# Patient Record
Sex: FEMALE | Race: WHITE
[De-identification: names, ages, dates, MRNs, and addresses within clinical notes are randomized per-mention and may not be internally consistent; named-entity substitution may affect disease eponyms.]

---

## 2017-04-05 ENCOUNTER — APPOINTMENT (OUTPATIENT)
Dept: DERMATOLOGY | Facility: CLINIC | Age: 82
End: 2017-04-05

## 2021-12-15 ENCOUNTER — INPATIENT (INPATIENT)
Facility: HOSPITAL | Age: 86
LOS: 5 days | Discharge: HOME | DRG: 193 | End: 2021-12-21
Attending: HOSPITALIST | Admitting: INTERNAL MEDICINE
Payer: MEDICARE

## 2021-12-15 VITALS
HEIGHT: 64 IN | WEIGHT: 158.73 LBS | DIASTOLIC BLOOD PRESSURE: 93 MMHG | SYSTOLIC BLOOD PRESSURE: 187 MMHG | RESPIRATION RATE: 24 BRPM | HEART RATE: 104 BPM | OXYGEN SATURATION: 92 % | TEMPERATURE: 99 F

## 2021-12-15 DIAGNOSIS — J10.01 INFLUENZA DUE TO OTHER IDENTIFIED INFLUENZA VIRUS WITH THE SAME OTHER IDENTIFIED INFLUENZA VIRUS PNEUMONIA: ICD-10-CM

## 2021-12-15 LAB
ALBUMIN SERPL ELPH-MCNC: 3.4 G/DL — SIGNIFICANT CHANGE UP (ref 3.3–5)
ALP SERPL-CCNC: 114 U/L — SIGNIFICANT CHANGE UP (ref 40–120)
ALT FLD-CCNC: 27 U/L — SIGNIFICANT CHANGE UP (ref 12–78)
ANION GAP SERPL CALC-SCNC: 6 MMOL/L — SIGNIFICANT CHANGE UP (ref 5–17)
AST SERPL-CCNC: 24 U/L — SIGNIFICANT CHANGE UP (ref 15–37)
BASE EXCESS BLDV CALC-SCNC: 0.7 MMOL/L — SIGNIFICANT CHANGE UP
BASOPHILS # BLD AUTO: 0.06 K/UL — SIGNIFICANT CHANGE UP (ref 0–0.2)
BASOPHILS NFR BLD AUTO: 0.7 % — SIGNIFICANT CHANGE UP (ref 0–2)
BILIRUB SERPL-MCNC: 0.7 MG/DL — SIGNIFICANT CHANGE UP (ref 0.2–1.2)
BUN SERPL-MCNC: 16 MG/DL — SIGNIFICANT CHANGE UP (ref 7–23)
CALCIUM SERPL-MCNC: 9.9 MG/DL — SIGNIFICANT CHANGE UP (ref 8.5–10.1)
CHLORIDE SERPL-SCNC: 104 MMOL/L — SIGNIFICANT CHANGE UP (ref 96–108)
CO2 BLDV-SCNC: 25 MMOL/L — SIGNIFICANT CHANGE UP (ref 22–26)
CO2 SERPL-SCNC: 25 MMOL/L — SIGNIFICANT CHANGE UP (ref 22–31)
CREAT SERPL-MCNC: 1.01 MG/DL — SIGNIFICANT CHANGE UP (ref 0.5–1.3)
EOSINOPHIL # BLD AUTO: 0.02 K/UL — SIGNIFICANT CHANGE UP (ref 0–0.5)
EOSINOPHIL NFR BLD AUTO: 0.2 % — SIGNIFICANT CHANGE UP (ref 0–6)
FLUAV H1 2009 PAND RNA SPEC QL NAA+PROBE: DETECTED
GLUCOSE SERPL-MCNC: 132 MG/DL — HIGH (ref 70–99)
HCO3 BLDV-SCNC: 24 MMOL/L — SIGNIFICANT CHANGE UP (ref 22–29)
HCT VFR BLD CALC: 36.7 % — SIGNIFICANT CHANGE UP (ref 34.5–45)
HGB BLD-MCNC: 11.7 G/DL — SIGNIFICANT CHANGE UP (ref 11.5–15.5)
IMM GRANULOCYTES NFR BLD AUTO: 0.5 % — SIGNIFICANT CHANGE UP (ref 0–1.5)
LACTATE SERPL-SCNC: 1.4 MMOL/L — SIGNIFICANT CHANGE UP (ref 0.7–2)
LYMPHOCYTES # BLD AUTO: 1.13 K/UL — SIGNIFICANT CHANGE UP (ref 1–3.3)
LYMPHOCYTES # BLD AUTO: 13.3 % — SIGNIFICANT CHANGE UP (ref 13–44)
MCHC RBC-ENTMCNC: 29.4 PG — SIGNIFICANT CHANGE UP (ref 27–34)
MCHC RBC-ENTMCNC: 31.9 GM/DL — LOW (ref 32–36)
MCV RBC AUTO: 92.2 FL — SIGNIFICANT CHANGE UP (ref 80–100)
MONOCYTES # BLD AUTO: 0.75 K/UL — SIGNIFICANT CHANGE UP (ref 0–0.9)
MONOCYTES NFR BLD AUTO: 8.8 % — SIGNIFICANT CHANGE UP (ref 2–14)
NEUTROPHILS # BLD AUTO: 6.5 K/UL — SIGNIFICANT CHANGE UP (ref 1.8–7.4)
NEUTROPHILS NFR BLD AUTO: 76.5 % — SIGNIFICANT CHANGE UP (ref 43–77)
NT-PROBNP SERPL-SCNC: 6011 PG/ML — HIGH (ref 0–450)
PCO2 BLDV: 35 MMHG — LOW (ref 39–42)
PH BLDV: 7.45 — HIGH (ref 7.32–7.43)
PLATELET # BLD AUTO: 240 K/UL — SIGNIFICANT CHANGE UP (ref 150–400)
PO2 BLDV: 240 MMHG — SIGNIFICANT CHANGE UP
POTASSIUM SERPL-MCNC: 4.2 MMOL/L — SIGNIFICANT CHANGE UP (ref 3.5–5.3)
POTASSIUM SERPL-SCNC: 4.2 MMOL/L — SIGNIFICANT CHANGE UP (ref 3.5–5.3)
PROT SERPL-MCNC: 7.3 GM/DL — SIGNIFICANT CHANGE UP (ref 6–8.3)
RAPID RVP RESULT: DETECTED
RBC # BLD: 3.98 M/UL — SIGNIFICANT CHANGE UP (ref 3.8–5.2)
RBC # FLD: 14 % — SIGNIFICANT CHANGE UP (ref 10.3–14.5)
SAO2 % BLDV: 100 % — SIGNIFICANT CHANGE UP
SARS-COV-2 RNA SPEC QL NAA+PROBE: SIGNIFICANT CHANGE UP
SODIUM SERPL-SCNC: 135 MMOL/L — SIGNIFICANT CHANGE UP (ref 135–145)
TROPONIN I, HIGH SENSITIVITY RESULT: 144.9 NG/L — HIGH
TROPONIN I, HIGH SENSITIVITY RESULT: 179.27 NG/L — HIGH
WBC # BLD: 8.5 K/UL — SIGNIFICANT CHANGE UP (ref 3.8–10.5)
WBC # FLD AUTO: 8.5 K/UL — SIGNIFICANT CHANGE UP (ref 3.8–10.5)

## 2021-12-15 PROCEDURE — G1004: CPT

## 2021-12-15 PROCEDURE — 93005 ELECTROCARDIOGRAM TRACING: CPT

## 2021-12-15 PROCEDURE — 93306 TTE W/DOPPLER COMPLETE: CPT | Mod: 26

## 2021-12-15 PROCEDURE — 71250 CT THORAX DX C-: CPT | Mod: 26

## 2021-12-15 PROCEDURE — 87205 SMEAR GRAM STAIN: CPT

## 2021-12-15 PROCEDURE — 93306 TTE W/DOPPLER COMPLETE: CPT

## 2021-12-15 PROCEDURE — 94640 AIRWAY INHALATION TREATMENT: CPT

## 2021-12-15 PROCEDURE — 83880 ASSAY OF NATRIURETIC PEPTIDE: CPT

## 2021-12-15 PROCEDURE — 84484 ASSAY OF TROPONIN QUANT: CPT

## 2021-12-15 PROCEDURE — 94760 N-INVAS EAR/PLS OXIMETRY 1: CPT

## 2021-12-15 PROCEDURE — 99222 1ST HOSP IP/OBS MODERATE 55: CPT

## 2021-12-15 PROCEDURE — 76770 US EXAM ABDO BACK WALL COMP: CPT

## 2021-12-15 PROCEDURE — 36415 COLL VENOUS BLD VENIPUNCTURE: CPT

## 2021-12-15 PROCEDURE — 84156 ASSAY OF PROTEIN URINE: CPT

## 2021-12-15 PROCEDURE — 85027 COMPLETE CBC AUTOMATED: CPT

## 2021-12-15 PROCEDURE — 97116 GAIT TRAINING THERAPY: CPT | Mod: GP

## 2021-12-15 PROCEDURE — 81001 URINALYSIS AUTO W/SCOPE: CPT

## 2021-12-15 PROCEDURE — 82570 ASSAY OF URINE CREATININE: CPT

## 2021-12-15 PROCEDURE — 85025 COMPLETE CBC W/AUTO DIFF WBC: CPT

## 2021-12-15 PROCEDURE — 99285 EMERGENCY DEPT VISIT HI MDM: CPT

## 2021-12-15 PROCEDURE — 71250 CT THORAX DX C-: CPT | Mod: ME

## 2021-12-15 PROCEDURE — 71045 X-RAY EXAM CHEST 1 VIEW: CPT | Mod: 26

## 2021-12-15 PROCEDURE — 71045 X-RAY EXAM CHEST 1 VIEW: CPT

## 2021-12-15 PROCEDURE — 80048 BASIC METABOLIC PNL TOTAL CA: CPT

## 2021-12-15 PROCEDURE — 97162 PT EVAL MOD COMPLEX 30 MIN: CPT | Mod: GP

## 2021-12-15 RX ORDER — HEPARIN SODIUM 5000 [USP'U]/ML
5000 INJECTION INTRAVENOUS; SUBCUTANEOUS EVERY 12 HOURS
Refills: 0 | Status: DISCONTINUED | OUTPATIENT
Start: 2021-12-15 | End: 2021-12-21

## 2021-12-15 RX ORDER — ATORVASTATIN CALCIUM 80 MG/1
40 TABLET, FILM COATED ORAL AT BEDTIME
Refills: 0 | Status: DISCONTINUED | OUTPATIENT
Start: 2021-12-15 | End: 2021-12-21

## 2021-12-15 RX ORDER — ONDANSETRON 8 MG/1
4 TABLET, FILM COATED ORAL EVERY 8 HOURS
Refills: 0 | Status: DISCONTINUED | OUTPATIENT
Start: 2021-12-15 | End: 2021-12-21

## 2021-12-15 RX ORDER — CLOPIDOGREL BISULFATE 75 MG/1
75 TABLET, FILM COATED ORAL DAILY
Refills: 0 | Status: DISCONTINUED | OUTPATIENT
Start: 2021-12-15 | End: 2021-12-21

## 2021-12-15 RX ORDER — LANOLIN ALCOHOL/MO/W.PET/CERES
3 CREAM (GRAM) TOPICAL AT BEDTIME
Refills: 0 | Status: DISCONTINUED | OUTPATIENT
Start: 2021-12-15 | End: 2021-12-21

## 2021-12-15 RX ORDER — POTASSIUM CHLORIDE 20 MEQ
10 PACKET (EA) ORAL
Refills: 0 | Status: DISCONTINUED | OUTPATIENT
Start: 2021-12-15 | End: 2021-12-21

## 2021-12-15 RX ORDER — ACETAMINOPHEN 500 MG
650 TABLET ORAL EVERY 6 HOURS
Refills: 0 | Status: DISCONTINUED | OUTPATIENT
Start: 2021-12-15 | End: 2021-12-21

## 2021-12-15 RX ORDER — IPRATROPIUM/ALBUTEROL SULFATE 18-103MCG
3 AEROSOL WITH ADAPTER (GRAM) INHALATION
Refills: 0 | Status: COMPLETED | OUTPATIENT
Start: 2021-12-15 | End: 2021-12-15

## 2021-12-15 RX ORDER — METOPROLOL TARTRATE 50 MG
50 TABLET ORAL DAILY
Refills: 0 | Status: DISCONTINUED | OUTPATIENT
Start: 2021-12-15 | End: 2021-12-19

## 2021-12-15 RX ORDER — METOPROLOL TARTRATE 50 MG
25 TABLET ORAL AT BEDTIME
Refills: 0 | Status: DISCONTINUED | OUTPATIENT
Start: 2021-12-15 | End: 2021-12-21

## 2021-12-15 RX ORDER — LATANOPROST 0.05 MG/ML
1 SOLUTION/ DROPS OPHTHALMIC; TOPICAL AT BEDTIME
Refills: 0 | Status: DISCONTINUED | OUTPATIENT
Start: 2021-12-15 | End: 2021-12-21

## 2021-12-15 RX ADMIN — Medication 25 MILLIGRAM(S): at 22:29

## 2021-12-15 RX ADMIN — CLOPIDOGREL BISULFATE 75 MILLIGRAM(S): 75 TABLET, FILM COATED ORAL at 18:50

## 2021-12-15 RX ADMIN — ATORVASTATIN CALCIUM 40 MILLIGRAM(S): 80 TABLET, FILM COATED ORAL at 22:29

## 2021-12-15 RX ADMIN — LATANOPROST 1 DROP(S): 0.05 SOLUTION/ DROPS OPHTHALMIC; TOPICAL at 22:29

## 2021-12-15 RX ADMIN — Medication 125 MILLIGRAM(S): at 08:54

## 2021-12-15 RX ADMIN — Medication 30 MILLIGRAM(S): at 22:29

## 2021-12-15 RX ADMIN — Medication 3 MILLILITER(S): at 11:33

## 2021-12-15 RX ADMIN — HEPARIN SODIUM 5000 UNIT(S): 5000 INJECTION INTRAVENOUS; SUBCUTANEOUS at 18:50

## 2021-12-15 RX ADMIN — Medication 3 MILLILITER(S): at 09:37

## 2021-12-15 RX ADMIN — Medication 3 MILLILITER(S): at 10:14

## 2021-12-15 NOTE — ED PROVIDER NOTE - OBJECTIVE STATEMENT
89yo f hx of COPD presents s/p cough and shortness of breath. symptoms x 2 days. no known fevers or chills. pt unable to provide hx 2/2 SOB.

## 2021-12-15 NOTE — H&P ADULT - ASSESSMENT
* SOB, acute hypoxic resp failure sec to flu  resolved  Tamiflu  supplemental O2  resume inhalers    * Cardiomegaly  check TTE, bilat pl eff, might need Lasix if BP allows  resume BB, Plavix    * Demand ischemia  trend trops,    d/w son call [phone] not the # in the chart  Full code

## 2021-12-15 NOTE — ED PROVIDER NOTE - NS ED ROS FT
Gen: No fever, normal appetite  Eyes: No eye irritation or discharge  ENT: No ear pain, congestion, sore throat  Resp: cough   Cardiovascular: No chest pain or palpitation  Gastroenteric: No nausea/vomiting, diarrhea, constipation  :  No change in urine output; no dysuria  MS: No joint or muscle pain  Skin: No rashes  Neuro: No headache; no abnormal movements  Remainder negative, except as per the HPI

## 2021-12-15 NOTE — PHARMACOTHERAPY INTERVENTION NOTE - COMMENTS
Med history complete, reviewed medications with doctor first med profile and [facility] [phone] Med history complete, patient unable to provide medication history, no emergency contact on file, reviewed medications with doctor first med profile and [facility] pharmacy [phone]

## 2021-12-15 NOTE — ED PROVIDER NOTE - PHYSICAL EXAMINATION
GEN - illl appearing   HEAD - NC/AT     EYES - EOMI, no conjunctival pallor, no scleral icterus  ENT -   mucous membranes  moist , no discharge      NECK - Neck supple  PULM - diffuse wheezing   COR -  RRR, S1 S2, no murmurs  ABD - , ND, NT, soft, no guarding, no rebound, no masses    BACK - no CVA tenderness, nontender spine     EXTREMS - no edema, no deformity, warm and well perfused    SKIN - no rash or bruising      NEUROLOGIC - alert, sensation nl, motor 5/5 RUE/LUE/RLE/LLE

## 2021-12-15 NOTE — ED ADULT TRIAGE NOTE - CHIEF COMPLAINT QUOTE
pt presents to ed via ems from home fore evaluation of trouble breathing since last night, in ed pt tachypneic and pulse ox 92 % on room air, pt wheezing. pt has hx of mild dementia.  pt son gene . pt received covid vaccine, pt unable to provide hx due to acuity of illness. pt on plavix vasotac metoprolol

## 2021-12-15 NOTE — H&P ADULT - NSHPLABSRESULTS_GEN_ALL_CORE
11.7   8.50  )-----------( 240      ( 15 Dec 2021 08:37 )             36.7   12-15    135  |  104  |  16  ----------------------------<  132<H>  4.2   |  25  |  1.01    Ca    9.9      15 Dec 2021 08:37    TPro  7.3  /  Alb  3.4  /  TBili  0.7  /  DBili  x   /  AST  24  /  ALT  27  /  AlkPhos  114  12-15    cxr my interpretation: new small pl eff bilateral, L>R, cardiomegaly    BNP 6K  ekg no ischemia

## 2021-12-15 NOTE — ED ADULT NURSE NOTE - NSIMPLEMENTINTERV_GEN_ALL_ED
Implemented All Fall Risk Interventions:  [place] to call system. Call bell, personal items and telephone within reach. Instruct patient to call for assistance. Room bathroom lighting operational. Non-slip footwear when patient is off stretcher. Physically safe environment: no spills, clutter or unnecessary equipment. Stretcher in lowest position, wheels locked, appropriate side rails in place. Provide visual cue, wrist band, yellow gown, etc. Monitor gait and stability. Monitor for mental status changes and reorient to person, place, and time. Review medications for side effects contributing to fall risk. Reinforce activity limits and safety measures with patient and family.

## 2021-12-15 NOTE — H&P ADULT - HISTORY OF PRESENT ILLNESS
89yo f hx of COPD presents s/p cough and shortness of breath. symptoms x 2 days. no known fevers or chills. pt unable to provide hx 2/2 SOB. She is + for flu     89yo f hx of COPD presents s/p cough and shortness of breath; symptoms x 2 days. no known fevers or chills. pt unable to provide hx 2/2 SOB. She is + for flu. Feels better on O2 and has no c/o.

## 2021-12-15 NOTE — PATIENT PROFILE ADULT - FALL HARM RISK - HARM RISK INTERVENTIONS
Assistance with ambulation/Assistance OOB with selected safe patient handling equipment/Communicate Risk of Fall with Harm to all staff/Reinforce activity limits and safety measures with patient and family/Review medications for side effects contributing to fall risk/Sit up slowly, dangle for a short time, stand at bedside before walking/Tailored Fall Risk Interventions/Toileting schedule using arm’s reach rule for commode and bathroom/Visual Cue: Yellow wristband and red socks/Bed in lowest position, wheels locked, appropriate side rails in place/Call bell, personal items and telephone in reach/Instruct patient to call for assistance before getting out of bed or chair/Non-slip footwear when patient is out of bed/[place] to call system/Physically safe environment - no spills, clutter or unnecessary equipment/Purposeful Proactive Rounding/Room/bathroom lighting operational, light cord in reach

## 2021-12-16 LAB
ADD ON TEST-SPECIMEN IN LAB: SIGNIFICANT CHANGE UP
ANION GAP SERPL CALC-SCNC: 4 MMOL/L — LOW (ref 5–17)
BUN SERPL-MCNC: 28 MG/DL — HIGH (ref 7–23)
CALCIUM SERPL-MCNC: 9.5 MG/DL — SIGNIFICANT CHANGE UP (ref 8.5–10.1)
CHLORIDE SERPL-SCNC: 109 MMOL/L — HIGH (ref 96–108)
CO2 SERPL-SCNC: 26 MMOL/L — SIGNIFICANT CHANGE UP (ref 22–31)
CREAT SERPL-MCNC: 1.08 MG/DL — SIGNIFICANT CHANGE UP (ref 0.5–1.3)
GLUCOSE SERPL-MCNC: 120 MG/DL — HIGH (ref 70–99)
HCT VFR BLD CALC: 34.3 % — LOW (ref 34.5–45)
HGB BLD-MCNC: 11 G/DL — LOW (ref 11.5–15.5)
MCHC RBC-ENTMCNC: 29.4 PG — SIGNIFICANT CHANGE UP (ref 27–34)
MCHC RBC-ENTMCNC: 32.1 GM/DL — SIGNIFICANT CHANGE UP (ref 32–36)
MCV RBC AUTO: 91.7 FL — SIGNIFICANT CHANGE UP (ref 80–100)
PLATELET # BLD AUTO: 259 K/UL — SIGNIFICANT CHANGE UP (ref 150–400)
POTASSIUM SERPL-MCNC: 4.3 MMOL/L — SIGNIFICANT CHANGE UP (ref 3.5–5.3)
POTASSIUM SERPL-SCNC: 4.3 MMOL/L — SIGNIFICANT CHANGE UP (ref 3.5–5.3)
RBC # BLD: 3.74 M/UL — LOW (ref 3.8–5.2)
RBC # FLD: 14 % — SIGNIFICANT CHANGE UP (ref 10.3–14.5)
SODIUM SERPL-SCNC: 139 MMOL/L — SIGNIFICANT CHANGE UP (ref 135–145)
WBC # BLD: 8.82 K/UL — SIGNIFICANT CHANGE UP (ref 3.8–10.5)
WBC # FLD AUTO: 8.82 K/UL — SIGNIFICANT CHANGE UP (ref 3.8–10.5)

## 2021-12-16 PROCEDURE — 99232 SBSQ HOSP IP/OBS MODERATE 35: CPT

## 2021-12-16 PROCEDURE — 93010 ELECTROCARDIOGRAM REPORT: CPT

## 2021-12-16 RX ORDER — CHOLECALCIFEROL (VITAMIN D3) 125 MCG
1000 CAPSULE ORAL DAILY
Refills: 0 | Status: DISCONTINUED | OUTPATIENT
Start: 2021-12-16 | End: 2021-12-21

## 2021-12-16 RX ORDER — METOPROLOL TARTRATE 50 MG
2 TABLET ORAL
Qty: 0 | Refills: 0 | DISCHARGE

## 2021-12-16 RX ORDER — ALBUTEROL 90 UG/1
2 AEROSOL, METERED ORAL EVERY 6 HOURS
Refills: 0 | Status: DISCONTINUED | OUTPATIENT
Start: 2021-12-16 | End: 2021-12-21

## 2021-12-16 RX ORDER — POTASSIUM CHLORIDE 20 MEQ
1 PACKET (EA) ORAL
Qty: 0 | Refills: 0 | DISCHARGE

## 2021-12-16 RX ORDER — BUDESONIDE AND FORMOTEROL FUMARATE DIHYDRATE 160; 4.5 UG/1; UG/1
2 AEROSOL RESPIRATORY (INHALATION)
Refills: 0 | Status: DISCONTINUED | OUTPATIENT
Start: 2021-12-16 | End: 2021-12-21

## 2021-12-16 RX ORDER — NYSTATIN CREAM 100000 [USP'U]/G
1 CREAM TOPICAL
Refills: 0 | Status: DISCONTINUED | OUTPATIENT
Start: 2021-12-16 | End: 2021-12-21

## 2021-12-16 RX ORDER — IRON POLYSACCHARIDE COMPLEX 150 MG
1 CAPSULE ORAL
Qty: 0 | Refills: 0 | DISCHARGE

## 2021-12-16 RX ORDER — ATORVASTATIN CALCIUM 80 MG/1
1 TABLET, FILM COATED ORAL
Qty: 0 | Refills: 0 | DISCHARGE

## 2021-12-16 RX ORDER — CLOPIDOGREL BISULFATE 75 MG/1
1 TABLET, FILM COATED ORAL
Qty: 0 | Refills: 0 | DISCHARGE

## 2021-12-16 RX ORDER — METOPROLOL TARTRATE 50 MG
1 TABLET ORAL
Qty: 0 | Refills: 0 | DISCHARGE

## 2021-12-16 RX ADMIN — HEPARIN SODIUM 5000 UNIT(S): 5000 INJECTION INTRAVENOUS; SUBCUTANEOUS at 09:31

## 2021-12-16 RX ADMIN — Medication 10 MILLIGRAM(S): at 09:31

## 2021-12-16 RX ADMIN — NYSTATIN CREAM 1 APPLICATION(S): 100000 CREAM TOPICAL at 21:16

## 2021-12-16 RX ADMIN — ATORVASTATIN CALCIUM 40 MILLIGRAM(S): 80 TABLET, FILM COATED ORAL at 21:18

## 2021-12-16 RX ADMIN — Medication 25 MILLIGRAM(S): at 21:18

## 2021-12-16 RX ADMIN — Medication 30 MILLIGRAM(S): at 21:18

## 2021-12-16 RX ADMIN — Medication 50 MILLIGRAM(S): at 09:47

## 2021-12-16 RX ADMIN — CLOPIDOGREL BISULFATE 75 MILLIGRAM(S): 75 TABLET, FILM COATED ORAL at 09:31

## 2021-12-16 RX ADMIN — Medication 30 MILLIGRAM(S): at 09:32

## 2021-12-16 RX ADMIN — LATANOPROST 1 DROP(S): 0.05 SOLUTION/ DROPS OPHTHALMIC; TOPICAL at 21:15

## 2021-12-16 RX ADMIN — HEPARIN SODIUM 5000 UNIT(S): 5000 INJECTION INTRAVENOUS; SUBCUTANEOUS at 21:18

## 2021-12-16 NOTE — DIETITIAN INITIAL EVALUATION ADULT. - ADD RECOMMEND
1) Add Ensure Enlive BID, for between/after meals 2) Suggest add MVI w/minerals, Vit C 500 mg BID, add Zinc Sulfate 220 mg x 10 days to promote wound healing 3) Initiate shifting, turning protocol to redistribute wt off PU 4) Consider GOC planning, suggest address nutrition support, nutrition support is not recommended due to overall declining medical status which evidenced based studies indicate EN is not effective in prolonging survival and improving quality of life. It can also increase risk of aspiration pneumonia as well as other related issues. 5) Implement aspiration precautions 6) Monitor BM, if no BM for >3days, consider adding bowel regimen 7) monitor labs, lytes and hydration prn 1) Add Ensure Enlive BID, for between/after meals 2) Suggest add MVI w/minerals, Vit C 500 mg BID, add Zinc Sulfate 220 mg x 10 days to promote wound healing 3) Initiate shifting, turning protocol to redistribute wt off PU 4) Consider GOC planning, suggest address nutrition support, nutrition support is not recommended due to overall declining medical status which evidenced based studies indicate EN is not effective in prolonging survival and improving quality of life. It can also increase risk of aspiration pneumonia as well as other related issues. 5) Implement aspiration precautions 6) Monitor BM, if no BM for >3days, consider adding bowel regimen 7) monitor labs, lytes and hydration prn 8) Consider SLP consult to address pureed textured diet order

## 2021-12-16 NOTE — PROGRESS NOTE ADULT - ASSESSMENT
* SOB, acute hypoxic resp failure sec to flu  Tamiflu  supplemental O2  nebs      * Cardiomegaly  check TTE, bilat pl eff, might need Lasix if BP allows  resume BB, Plavix    * Elevated troponin likely related Demand ischemia  - trend trops  - trending down     d/w son call [phone]  Full code  Possible dc in 24 hours.

## 2021-12-16 NOTE — PROGRESS NOTE ADULT - SUBJECTIVE AND OBJECTIVE BOX
89 yo f hx of COPD presents s/p cough and shortness of breath; symptoms x 2 days. No known fevers or chills. Pt unable to provide hx 2/2 SOB. She is + for Influenza, on supplemental O2. Elevated troponin and BNP on admission.     12/16- patient was seen and examined. Oriented x3- stated that she still feels SOB and coughing a lot. Denies CP.    Vital Signs Last 24 Hrs  T(C): 36.8 (16 Dec 2021 07:36), Max: 36.8 (15 Dec 2021 17:00)  T(F): 98.2 (16 Dec 2021 07:36), Max: 98.2 (15 Dec 2021 17:00)  HR: 75 (16 Dec 2021 07:36) (75 - 92)  BP: 143/60 (16 Dec 2021 07:36) (134/52 - 146/60)  BP(mean): 80 (15 Dec 2021 17:00) (80 - 80)  RR: 19 (16 Dec 2021 07:36) (19 - 24)  SpO2: 100% (16 Dec 2021 07:36) (96% - 100%)    ROS:   All 10 systems reviewed and found to be negative with the exception of what has been described above.       PE:  Constitutional: NAD, laying in bed  HEENT: NC/AT  Back: no tenderness  Respiratory: respirations even and non labored, LCTA- on supplemental O2   Cardiovascular: S1S2 regular, no murmurs  Abdomen: soft, not tender, not distended, positive BS  Genitourinary: voiding  Musculoskeletal: no muscle tenderness, no joint swelling or tenderness  Extremities: no pedal edema   Neurological: no focal deficits    MEDICATIONS  (STANDING):  atorvastatin 40 milliGRAM(s) Oral at bedtime  budesonide 160 MICROgram(s)/formoterol 4.5 MICROgram(s) Inhaler 2 Puff(s) Inhalation two times a day  cholecalciferol 1000 Unit(s) Oral daily  clopidogrel Tablet 75 milliGRAM(s) Oral daily  enalapril 10 milliGRAM(s) Oral daily  heparin   Injectable 5000 Unit(s) SubCutaneous every 12 hours  latanoprost 0.005% Ophthalmic Solution 1 Drop(s) Both EYES at bedtime  metoprolol tartrate 50 milliGRAM(s) Oral daily  metoprolol tartrate 25 milliGRAM(s) Oral at bedtime  multivitamin 1 Tablet(s) Oral daily  nystatin Powder 1 Application(s) Topical two times a day  oseltamivir 30 milliGRAM(s) Oral two times a day  potassium chloride    Tablet ER 10 milliEquivalent(s) Oral daily    MEDICATIONS  (PRN):  acetaminophen     Tablet .. 650 milliGRAM(s) Oral every 6 hours PRN Temp greater or equal to 38C (100.4F), Mild Pain (1 - 3)  ALBUTerol    90 MICROgram(s) HFA Inhaler 2 Puff(s) Inhalation every 6 hours PRN Shortness of Breath and/or Wheezing  aluminum hydroxide/magnesium hydroxide/simethicone Suspension 30 milliLiter(s) Oral every 4 hours PRN Dyspepsia  melatonin 3 milliGRAM(s) Oral at bedtime PRN Insomnia  ondansetron Injectable 4 milliGRAM(s) IV Push every 8 hours PRN Nausea and/or Vomiting      12-16    139  |  109<H>  |  28<H>  ----------------------------<  120<H>  4.3   |  26  |  1.08    Ca    9.5      16 Dec 2021 09:40    TPro  7.3  /  Alb  3.4  /  TBili  0.7  /  DBili  x   /  AST  24  /  ALT  27  /  AlkPhos  114  12-15                          11.0   8.82  )-----------( 259      ( 16 Dec 2021 09:40 )             34.3       < from: CT Chest No Cont (12.15.21 @ 13:07) >  PROCEDURE DATE:  12/15/2021          INTERPRETATION:  CLINICAL INFORMATION: Pneumonia    TECHNIQUE:  A volumetric CT acquisition of the chest was obtained from   the thoracic inlet to the upperabdomen, without the administration  of   intravenous contrast. Coronal and sagittal multiplanar reformations were   also submitted.    Comparison: No prior chest CT scan is available for comparison    FINDINGS:    Lungs/Airways/Pleura: Study is expiratory with mosaic appearance of the   lungs. No lung consolidation or groundglass. There are small right   greater than left pleural effusions.    Mediastinum/Lymph nodes: No thoracic adenopathy. There is a moderate   hiatal hernia.    Heart and Vessels: The left atrium is enlarged. There is bulky near   completely circumferential mitral annular calcification. There is   moderate aortic valve calcification. The ascending aorta measures 3.8 cm   at the level of the right pulmonary artery. No pericardial effusion.    Upper Abdomen: There is an exophytic hyperdense 1.6 cm right renal   lesion, possibly a hyperdense cyst although not characterized on this   study. 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    Osseous structures and Soft Tissues: There is diffuse qualitative   osteopenia. There is an age-indeterminate L1 vertebral body compression   fracture.    IMPRESSION:    1.  No pneumonia. Small right greater than left pleural effusions.    2.  Moderate-sized hiatal hernia    3.  Left atrial enlargement      < end of copied text >

## 2021-12-16 NOTE — DIETITIAN INITIAL EVALUATION ADULT. - MALNUTRITION
Meets criteria for severe protein-calorie malnutrition in the context of acute on chronic illness/injury Meets criteria for severe protein-calorie malnutrition in the context of acute on chronic illness/injury r/t decreased ability to meet nutrient needs 2/2 influenza/ SOB w/COPD AEB 7% wt loss x 1 month, mod (+2) edema and mod muscle/fat wasting

## 2021-12-16 NOTE — DIETITIAN INITIAL EVALUATION ADULT. - PERTINENT LABORATORY DATA
12-16    139  |  109<H>  |  28<H>  ----------------------------<  120<H>  4.3   |  26  |  1.08    Ca    9.5      16 Dec 2021 09:40    TPro  7.3  /  Alb  3.4  /  TBili  0.7  /  DBili  x   /  AST  24  /  ALT  27  /  AlkPhos  114  12-15  BMI: BMI (kg/m2): 27.2 (12-15-21 @ 08:03)  HbA1c:   Glucose:   BP: 143/60 (12-16-21 @ 07:36) (126/53 - 187/93)  Lipid Panel:

## 2021-12-16 NOTE — DIETITIAN INITIAL EVALUATION ADULT. - PERTINENT MEDS FT
MEDICATIONS  (STANDING):  atorvastatin 40 milliGRAM(s) Oral at bedtime  clopidogrel Tablet 75 milliGRAM(s) Oral daily  enalapril 10 milliGRAM(s) Oral daily  heparin   Injectable 5000 Unit(s) SubCutaneous every 12 hours  latanoprost 0.005% Ophthalmic Solution 1 Drop(s) Both EYES at bedtime  metoprolol tartrate 50 milliGRAM(s) Oral daily  metoprolol tartrate 25 milliGRAM(s) Oral at bedtime  oseltamivir 30 milliGRAM(s) Oral two times a day  potassium chloride    Tablet ER 10 milliEquivalent(s) Oral two times a day    MEDICATIONS  (PRN):  acetaminophen     Tablet .. 650 milliGRAM(s) Oral every 6 hours PRN Temp greater or equal to 38C (100.4F), Mild Pain (1 - 3)  aluminum hydroxide/magnesium hydroxide/simethicone Suspension 30 milliLiter(s) Oral every 4 hours PRN Dyspepsia  melatonin 3 milliGRAM(s) Oral at bedtime PRN Insomnia  ondansetron Injectable 4 milliGRAM(s) IV Push every 8 hours PRN Nausea and/or Vomiting

## 2021-12-16 NOTE — DIETITIAN INITIAL EVALUATION ADULT. - PHYSCIAL ASSESSMENT
frail Juan Luis Score: 17  Last BM: none documented since admission 12-15 (1 day)  Skin- PU st. 2 R shin

## 2021-12-16 NOTE — DIETITIAN INITIAL EVALUATION ADULT. - OTHER INFO
89 yo female PMH COPD, CAD, presenting with influenza virus, SOB and PU st 2. Lives in apartment on sons property and has a home health aid that comes x3 days, weekly. She does not allow the home health aid to cook for her. Her son brings her food occasionally but only small breakfast and lunches. She does not eat dinner. Current diet order, pureed, despite absence of SLP note or order. Pt reported appetite as "fine", however she stated she ate "a small bites" from her breakfast tray. Unable to obtain bed wt, used wt from chart. PU stage 2 on R Perez. Full Code. Suggest GOC planning, address nutrition support. Wt loss 7% x 1 month. Meets criteria for severe protein-calorie malnutrition in the context of acute illness/injury   Pt will trial Ensure Enlive BID. See other recommendations below. Will continue to monitor and follow up prn. 89 yo female PMH COPD, CAD, presenting with influenza virus, SOB and PU st 2. Lives in apartment on sons property and has a home health aid that comes x3 days, weekly. She does not allow the home health aid to cook for her. Her son brings her food occasionally but only small breakfast and lunches. She does not eat dinner. Current diet order, pureed, despite absence of SLP note or order. Pt denies chewing/swallowing difficulty.  Pt reported appetite as "fine", however she stated she ate "a small bites" from her breakfast tray. Unable to obtain bed wt, used wt from chart. PU stage 2 on R Perez. Full Code. Suggest GOC planning, address nutrition support. Wt loss 7% x 1 month. Meets criteria for severe protein-calorie malnutrition in the context of acute illness/injury. Pt will trial Ensure Enlive BID. See other recommendations below. Will continue to monitor and follow up prn. 89 yo female PMH COPD, CAD, presenting with influenza virus, SOB and PU st 2. Lives in apartment on sons property and has a home health aide that comes x3 days, weekly. She does not allow the home health aide to cook for her. Her son brings her food occasionally but only small breakfast and lunches. She does not eat dinner. Current diet order, pureed, despite absence of SLP note or order. Pt denies chewing/swallowing difficulty.  Pt reported appetite as "fine", however she stated she ate "a small bites" from her breakfast tray. Unable to obtain bed wt, used wt from chart. PU stage 2 on R Perez. Full Code. Suggest GOC planning, address nutrition support. Wt loss 7% x 1 month. Meets criteria for severe protein-calorie malnutrition in the context of acute illness/injury. Pt will trial Ensure Enlive BID. See other recommendations below. Will continue to monitor and follow up prn.

## 2021-12-16 NOTE — DIETITIAN NUTRITION RISK NOTIFICATION - ADDITIONAL COMMENTS/DIETITIAN RECOMMENDATIONS
· Additional Recommendations  1) Add Ensure Enlive BID, for between/after meals 2) Suggest add MVI w/minerals, Vit C 500 mg BID, add Zinc Sulfate 220 mg x 10 days to promote wound healing 3) Initiate shifting, turning protocol to redistribute wt off PU 4) Consider GOC planning, suggest address nutrition support, nutrition support is not recommended due to overall declining medical status which evidenced based studies indicate EN is not effective in prolonging survival and improving quality of life. It can also increase risk of aspiration pneumonia as well as other related issues. 5) Implement aspiration precautions 6) Monitor BM, if no BM for >3days, consider adding bowel regimen 7) monitor labs, lytes and hydration prn 8) Consider SLP consult to address pureed textured diet order

## 2021-12-16 NOTE — DIETITIAN INITIAL EVALUATION ADULT. - ORAL INTAKE PTA/DIET HISTORY
PTA pt was receiving </= 50% dietary needs daily. Pt reported </= 50% dietary intake based on 24 hr diet recall .

## 2021-12-17 PROCEDURE — 93010 ELECTROCARDIOGRAM REPORT: CPT

## 2021-12-17 PROCEDURE — 99232 SBSQ HOSP IP/OBS MODERATE 35: CPT

## 2021-12-17 RX ORDER — PANTOPRAZOLE SODIUM 20 MG/1
40 TABLET, DELAYED RELEASE ORAL
Refills: 0 | Status: DISCONTINUED | OUTPATIENT
Start: 2021-12-17 | End: 2021-12-21

## 2021-12-17 RX ORDER — ALBUTEROL 90 UG/1
2 AEROSOL, METERED ORAL ONCE
Refills: 0 | Status: COMPLETED | OUTPATIENT
Start: 2021-12-17 | End: 2021-12-17

## 2021-12-17 RX ADMIN — Medication 1 TABLET(S): at 10:10

## 2021-12-17 RX ADMIN — HEPARIN SODIUM 5000 UNIT(S): 5000 INJECTION INTRAVENOUS; SUBCUTANEOUS at 10:10

## 2021-12-17 RX ADMIN — BUDESONIDE AND FORMOTEROL FUMARATE DIHYDRATE 2 PUFF(S): 160; 4.5 AEROSOL RESPIRATORY (INHALATION) at 22:06

## 2021-12-17 RX ADMIN — ALBUTEROL 2 PUFF(S): 90 AEROSOL, METERED ORAL at 18:15

## 2021-12-17 RX ADMIN — ALBUTEROL 2 PUFF(S): 90 AEROSOL, METERED ORAL at 08:18

## 2021-12-17 RX ADMIN — Medication 30 MILLIGRAM(S): at 22:11

## 2021-12-17 RX ADMIN — LATANOPROST 1 DROP(S): 0.05 SOLUTION/ DROPS OPHTHALMIC; TOPICAL at 22:12

## 2021-12-17 RX ADMIN — Medication 50 MILLIGRAM(S): at 10:10

## 2021-12-17 RX ADMIN — NYSTATIN CREAM 1 APPLICATION(S): 100000 CREAM TOPICAL at 10:11

## 2021-12-17 RX ADMIN — Medication 30 MILLIGRAM(S): at 10:10

## 2021-12-17 RX ADMIN — Medication 1000 UNIT(S): at 10:09

## 2021-12-17 RX ADMIN — Medication 40 MILLIGRAM(S): at 15:42

## 2021-12-17 RX ADMIN — NYSTATIN CREAM 1 APPLICATION(S): 100000 CREAM TOPICAL at 22:12

## 2021-12-17 RX ADMIN — ATORVASTATIN CALCIUM 40 MILLIGRAM(S): 80 TABLET, FILM COATED ORAL at 22:10

## 2021-12-17 RX ADMIN — BUDESONIDE AND FORMOTEROL FUMARATE DIHYDRATE 2 PUFF(S): 160; 4.5 AEROSOL RESPIRATORY (INHALATION) at 08:18

## 2021-12-17 RX ADMIN — CLOPIDOGREL BISULFATE 75 MILLIGRAM(S): 75 TABLET, FILM COATED ORAL at 10:10

## 2021-12-17 RX ADMIN — Medication 3 MILLIGRAM(S): at 22:13

## 2021-12-17 RX ADMIN — ALBUTEROL 2 PUFF(S): 90 AEROSOL, METERED ORAL at 22:05

## 2021-12-17 RX ADMIN — ALBUTEROL 2 PUFF(S): 90 AEROSOL, METERED ORAL at 16:52

## 2021-12-17 RX ADMIN — Medication 10 MILLIGRAM(S): at 10:10

## 2021-12-17 RX ADMIN — Medication 25 MILLIGRAM(S): at 22:11

## 2021-12-17 RX ADMIN — Medication 40 MILLIGRAM(S): at 22:11

## 2021-12-17 RX ADMIN — HEPARIN SODIUM 5000 UNIT(S): 5000 INJECTION INTRAVENOUS; SUBCUTANEOUS at 22:11

## 2021-12-17 NOTE — CDI QUERY NOTE - NSCDIOTHERTXTBX_GEN_ALL_CORE_HH
Per PN 12/16 " * SOB, acute hypoxic resp failure sec to flu "  Per ER provider " Principal Discharge DX:	Influenza A virus present."    Labs reveal : Influenza AH3 (RapRVP): Detected (12.15.21 @ 08:37)     Please clarify the Type of Flu  a) Influenza AH3  b) Influenza A virus present  c) Influenza from unidentified virus  d) Other, please clarify

## 2021-12-17 NOTE — PROGRESS NOTE ADULT - SUBJECTIVE AND OBJECTIVE BOX
89 yo f hx of COPD presents s/p cough and shortness of breath; symptoms x 2 days. No known fevers or chills. Pt unable to provide hx 2/2 SOB. She is + for Influenza, on supplemental O2. Elevated troponin and BNP on admission.     12/16- patient was seen and examined. Oriented x3- stated that she still feels SOB and coughing a lot. Denies CP.  12/17- pt still complaining of SOB and coughing, +wheezing. SOB improved from yesterday.     Vital Signs Last 24 Hrs  T(C): 36.4 (17 Dec 2021 09:01), Max: 36.4 (16 Dec 2021 17:13)  T(F): 97.5 (17 Dec 2021 09:01), Max: 97.5 (16 Dec 2021 17:13)  HR: 74 (17 Dec 2021 09:01) (74 - 78)  BP: 142/57 (17 Dec 2021 09:01) (142/57 - 165/48)  BP(mean): --  RR: 18 (17 Dec 2021 09:01) (18 - 18)  SpO2: 98% (17 Dec 2021 09:01) (98% - 100%)    ROS:   All 10 systems reviewed and found to be negative with the exception of what has been described above.       PE:  Constitutional: NAD, laying in bed  HEENT: NC/AT  Back: no tenderness  Respiratory: respirations even and non labored, +expiratory wheezing  on supplemental O2   Cardiovascular: S1S2 regular, no murmurs  Abdomen: soft, not tender, not distended, positive BS  Genitourinary: voiding  Musculoskeletal: no muscle tenderness, no joint swelling or tenderness  Extremities: no pedal edema   Neurological: no focal deficits    MEDICATIONS  (STANDING):  atorvastatin 40 milliGRAM(s) Oral at bedtime  budesonide 160 MICROgram(s)/formoterol 4.5 MICROgram(s) Inhaler 2 Puff(s) Inhalation two times a day  cholecalciferol 1000 Unit(s) Oral daily  clopidogrel Tablet 75 milliGRAM(s) Oral daily  enalapril 10 milliGRAM(s) Oral daily  heparin   Injectable 5000 Unit(s) SubCutaneous every 12 hours  latanoprost 0.005% Ophthalmic Solution 1 Drop(s) Both EYES at bedtime  metoprolol tartrate 50 milliGRAM(s) Oral daily  metoprolol tartrate 25 milliGRAM(s) Oral at bedtime  multivitamin 1 Tablet(s) Oral daily  nystatin Powder 1 Application(s) Topical two times a day  oseltamivir 30 milliGRAM(s) Oral two times a day  potassium chloride    Tablet ER 10 milliEquivalent(s) Oral daily    MEDICATIONS  (PRN):  acetaminophen     Tablet .. 650 milliGRAM(s) Oral every 6 hours PRN Temp greater or equal to 38C (100.4F), Mild Pain (1 - 3)  ALBUTerol    90 MICROgram(s) HFA Inhaler 2 Puff(s) Inhalation every 6 hours PRN Shortness of Breath and/or Wheezing  aluminum hydroxide/magnesium hydroxide/simethicone Suspension 30 milliLiter(s) Oral every 4 hours PRN Dyspepsia  melatonin 3 milliGRAM(s) Oral at bedtime PRN Insomnia  ondansetron Injectable 4 milliGRAM(s) IV Push every 8 hours PRN Nausea and/or Vomiting            12-16    139  |  109<H>  |  28<H>  ----------------------------<  120<H>  4.3   |  26  |  1.08    Ca    9.5      16 Dec 2021 09:40    TPro  7.3  /  Alb  3.4  /  TBili  0.7  /  DBili  x   /  AST  24  /  ALT  27  /  AlkPhos  114  12-15                          11.0   8.82  )-----------( 259      ( 16 Dec 2021 09:40 )             34.3       < from: CT Chest No Cont (12.15.21 @ 13:07) >  PROCEDURE DATE:  12/15/2021          INTERPRETATION:  CLINICAL INFORMATION: Pneumonia    TECHNIQUE:  A volumetric CT acquisition of the chest was obtained from   the thoracic inlet to the upperabdomen, without the administration  of   intravenous contrast. Coronal and sagittal multiplanar reformations were   also submitted.    Comparison: No prior chest CT scan is available for comparison    FINDINGS:    Lungs/Airways/Pleura: Study is expiratory with mosaic appearance of the   lungs. No lung consolidation or groundglass. There are small right   greater than left pleural effusions.    Mediastinum/Lymph nodes: No thoracic adenopathy. There is a moderate   hiatal hernia.    Heart and Vessels: The left atrium is enlarged. There is bulky near   completely circumferential mitral annular calcification. There is   moderate aortic valve calcification. The ascending aorta measures 3.8 cm   at the level of the right pulmonary artery. No pericardial effusion.    Upper Abdomen: There is an exophytic hyperdense 1.6 cm right renal   lesion, possibly a hyperdense cyst although not characterized on this   study. 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    Osseous structures and Soft Tissues: There is diffuse qualitative   osteopenia. There is an age-indeterminate L1 vertebral body compression   fracture.    IMPRESSION:    1.  No pneumonia. Small right greater than left pleural effusions.    2.  Moderate-sized hiatal hernia    3.  Left atrial enlargement      < end of copied text >

## 2021-12-17 NOTE — PROGRESS NOTE ADULT - ASSESSMENT
* SOB, acute hypoxic resp failure sec to flu  Tamiflu  supplemental O2  nebs  monitor sats  +wheezing this AM- solumedrol IV started.       * Cardiomegaly  TTE- noted  stable     * Elevated troponin likely related Demand ischemia  - trend trops  - trending down     d/w son call [phone]  Full code   * SOB, acute hypoxic resp failure sec to Influenza AH3   Tamiflu  supplemental O2  nebs  monitor sats  +wheezing this AM- solumedrol IV started.   - as per her son - they have visitors from out of town who has covid. patient is negative for COVID.       * Cardiomegaly  TTE- noted  stable     * Elevated troponin likely related Demand ischemia  - trend trops  - trending down     d/w son call [phone]  Full code

## 2021-12-18 LAB
ANION GAP SERPL CALC-SCNC: 5 MMOL/L — SIGNIFICANT CHANGE UP (ref 5–17)
BASOPHILS # BLD AUTO: 0 K/UL — SIGNIFICANT CHANGE UP (ref 0–0.2)
BASOPHILS NFR BLD AUTO: 0 % — SIGNIFICANT CHANGE UP (ref 0–2)
BUN SERPL-MCNC: 55 MG/DL — HIGH (ref 7–23)
CALCIUM SERPL-MCNC: 9.1 MG/DL — SIGNIFICANT CHANGE UP (ref 8.5–10.1)
CHLORIDE SERPL-SCNC: 107 MMOL/L — SIGNIFICANT CHANGE UP (ref 96–108)
CO2 SERPL-SCNC: 28 MMOL/L — SIGNIFICANT CHANGE UP (ref 22–31)
CREAT SERPL-MCNC: 1.51 MG/DL — HIGH (ref 0.5–1.3)
EOSINOPHIL # BLD AUTO: 0 K/UL — SIGNIFICANT CHANGE UP (ref 0–0.5)
EOSINOPHIL NFR BLD AUTO: 0 % — SIGNIFICANT CHANGE UP (ref 0–6)
GLUCOSE SERPL-MCNC: 193 MG/DL — HIGH (ref 70–99)
HCT VFR BLD CALC: 34.7 % — SIGNIFICANT CHANGE UP (ref 34.5–45)
HGB BLD-MCNC: 11.1 G/DL — LOW (ref 11.5–15.5)
IMM GRANULOCYTES NFR BLD AUTO: 0.4 % — SIGNIFICANT CHANGE UP (ref 0–1.5)
LYMPHOCYTES # BLD AUTO: 0.65 K/UL — LOW (ref 1–3.3)
LYMPHOCYTES # BLD AUTO: 12.7 % — LOW (ref 13–44)
MCHC RBC-ENTMCNC: 29.4 PG — SIGNIFICANT CHANGE UP (ref 27–34)
MCHC RBC-ENTMCNC: 32 GM/DL — SIGNIFICANT CHANGE UP (ref 32–36)
MCV RBC AUTO: 91.8 FL — SIGNIFICANT CHANGE UP (ref 80–100)
MONOCYTES # BLD AUTO: 0.32 K/UL — SIGNIFICANT CHANGE UP (ref 0–0.9)
MONOCYTES NFR BLD AUTO: 6.3 % — SIGNIFICANT CHANGE UP (ref 2–14)
NEUTROPHILS # BLD AUTO: 4.11 K/UL — SIGNIFICANT CHANGE UP (ref 1.8–7.4)
NEUTROPHILS NFR BLD AUTO: 80.6 % — HIGH (ref 43–77)
NT-PROBNP SERPL-SCNC: 3009 PG/ML — HIGH (ref 0–450)
PLATELET # BLD AUTO: 261 K/UL — SIGNIFICANT CHANGE UP (ref 150–400)
POTASSIUM SERPL-MCNC: 4.9 MMOL/L — SIGNIFICANT CHANGE UP (ref 3.5–5.3)
POTASSIUM SERPL-SCNC: 4.9 MMOL/L — SIGNIFICANT CHANGE UP (ref 3.5–5.3)
RBC # BLD: 3.78 M/UL — LOW (ref 3.8–5.2)
RBC # FLD: 13.6 % — SIGNIFICANT CHANGE UP (ref 10.3–14.5)
SODIUM SERPL-SCNC: 140 MMOL/L — SIGNIFICANT CHANGE UP (ref 135–145)
WBC # BLD: 5.1 K/UL — SIGNIFICANT CHANGE UP (ref 3.8–10.5)
WBC # FLD AUTO: 5.1 K/UL — SIGNIFICANT CHANGE UP (ref 3.8–10.5)

## 2021-12-18 PROCEDURE — 99232 SBSQ HOSP IP/OBS MODERATE 35: CPT

## 2021-12-18 PROCEDURE — 71045 X-RAY EXAM CHEST 1 VIEW: CPT | Mod: 26

## 2021-12-18 RX ORDER — FUROSEMIDE 40 MG
20 TABLET ORAL ONCE
Refills: 0 | Status: COMPLETED | OUTPATIENT
Start: 2021-12-18 | End: 2021-12-18

## 2021-12-18 RX ADMIN — Medication 50 MILLIGRAM(S): at 09:50

## 2021-12-18 RX ADMIN — NYSTATIN CREAM 1 APPLICATION(S): 100000 CREAM TOPICAL at 21:25

## 2021-12-18 RX ADMIN — HEPARIN SODIUM 5000 UNIT(S): 5000 INJECTION INTRAVENOUS; SUBCUTANEOUS at 09:49

## 2021-12-18 RX ADMIN — NYSTATIN CREAM 1 APPLICATION(S): 100000 CREAM TOPICAL at 09:56

## 2021-12-18 RX ADMIN — ALBUTEROL 2 PUFF(S): 90 AEROSOL, METERED ORAL at 21:54

## 2021-12-18 RX ADMIN — Medication 40 MILLIGRAM(S): at 09:49

## 2021-12-18 RX ADMIN — Medication 1000 UNIT(S): at 09:45

## 2021-12-18 RX ADMIN — ALBUTEROL 2 PUFF(S): 90 AEROSOL, METERED ORAL at 09:16

## 2021-12-18 RX ADMIN — Medication 40 MILLIGRAM(S): at 21:25

## 2021-12-18 RX ADMIN — BUDESONIDE AND FORMOTEROL FUMARATE DIHYDRATE 2 PUFF(S): 160; 4.5 AEROSOL RESPIRATORY (INHALATION) at 21:54

## 2021-12-18 RX ADMIN — HEPARIN SODIUM 5000 UNIT(S): 5000 INJECTION INTRAVENOUS; SUBCUTANEOUS at 21:25

## 2021-12-18 RX ADMIN — Medication 10 MILLIGRAM(S): at 09:49

## 2021-12-18 RX ADMIN — ATORVASTATIN CALCIUM 40 MILLIGRAM(S): 80 TABLET, FILM COATED ORAL at 21:25

## 2021-12-18 RX ADMIN — LATANOPROST 1 DROP(S): 0.05 SOLUTION/ DROPS OPHTHALMIC; TOPICAL at 21:25

## 2021-12-18 RX ADMIN — Medication 30 MILLIGRAM(S): at 09:49

## 2021-12-18 RX ADMIN — Medication 20 MILLIGRAM(S): at 17:50

## 2021-12-18 RX ADMIN — CLOPIDOGREL BISULFATE 75 MILLIGRAM(S): 75 TABLET, FILM COATED ORAL at 09:45

## 2021-12-18 RX ADMIN — Medication 25 MILLIGRAM(S): at 21:25

## 2021-12-18 RX ADMIN — Medication 1 TABLET(S): at 09:45

## 2021-12-18 RX ADMIN — PANTOPRAZOLE SODIUM 40 MILLIGRAM(S): 20 TABLET, DELAYED RELEASE ORAL at 05:13

## 2021-12-18 RX ADMIN — BUDESONIDE AND FORMOTEROL FUMARATE DIHYDRATE 2 PUFF(S): 160; 4.5 AEROSOL RESPIRATORY (INHALATION) at 09:12

## 2021-12-18 NOTE — PROVIDER CONTACT NOTE (OTHER) - SITUATION
spoke with Michelle, patient hasn't been seen in office since 2016
called md  service spoke with Shaq
called md service spoke with Shaq
called md service spoke with renuka

## 2021-12-18 NOTE — PROGRESS NOTE ADULT - ASSESSMENT
* SOB, acute hypoxic resp failure sec to Influenza AH3   COPD exacerbation  cannot exclude CHF exacerbation  Tamiflu  supplemental O2  nebs  monitor sats  +wheezing this AM- solumedrol IV started.   - as per her son - they have visitors from out of town who has covid. patient is negative for COVID.  cardio and pulm consult     GIOVANNA  elevated BNP  check CXR- if CXR shows pulm edema will give trail of lasix IV   bladder scan   hold enalapril      * Cardiomegaly  TTE- EF wnl  stable     * Elevated troponin likely related Demand ischemia  - trend trops  - trending down     d/w son call [phone]  Full code     * SOB, acute hypoxic resp failure sec to Influenza AH3   COPD exacerbation  cannot exclude CHF exacerbation  Tamiflu  supplemental O2  nebs  monitor sats  +wheezing this AM- solumedrol IV started.   - as per her son - they have visitors from out of town who has covid. patient is negative for COVID.  cardio and pulm consult     GIOVANNA prerenal from CHF vs 2ndry to enalapril  elevated BNP  check CXR- if CXR shows pulm edema will give trail of lasix IV   bladder scan   hold enalapril      * Cardiomegaly  TTE- EF wnl  stable     * Elevated troponin likely related Demand ischemia  - trend trops  - trending down     d/w son call [phone]  Full code

## 2021-12-18 NOTE — PHYSICAL THERAPY INITIAL EVALUATION ADULT - LIVES WITH, PROFILE
son and dtr in law who both works from home, pt has 3 steps to enter house, has HHA 3x/week/children

## 2021-12-18 NOTE — PHYSICAL THERAPY INITIAL EVALUATION ADULT - PERTINENT HX OF CURRENT PROBLEM, REHAB EVAL
89 yo f hx of COPD presents s/p cough and shortness of breath; symptoms x 2 days. No known fevers or chills. Pt unable to provide hx 2/2 SOB. She is + for Influenza, on supplemental O2. Elevated troponin and BNP on admission.  SOB, acute hypoxic resp failure sec to Influenza AH3

## 2021-12-18 NOTE — PHYSICAL THERAPY INITIAL EVALUATION ADULT - DIAGNOSIS, PT EVAL
SOB, acute hypoxic resp failure sec to Influenza AH3 , Elevated troponin likely related Demand ischemia, on 2 lits of o2

## 2021-12-18 NOTE — PROGRESS NOTE ADULT - SUBJECTIVE AND OBJECTIVE BOX
89 yo f hx of COPD presents s/p cough and shortness of breath; symptoms x 2 days. No known fevers or chills. Pt unable to provide hx 2/2 SOB. She is + for Influenza, on supplemental O2. Elevated troponin and BNP on admission.     12/16- patient was seen and examined. Oriented x3- stated that she still feels SOB and coughing a lot. Denies CP.  12/17- pt still complaining of SOB and coughing, +wheezing. SOB improved from yesterday. ROS:   All 10 systems reviewed and found to be negative with the exception of what has been described above.       PE:  Constitutional: NAD, laying in bed  HEENT: NC/AT  Back: no tenderness  Respiratory: respirations even and non labored, +expiratory wheezing  on supplemental O2   Cardiovascular: S1S2 regular, no murmurs  Abdomen: soft, not tender, not distended, positive BS  Genitourinary: voiding  Musculoskeletal: no muscle tenderness, no joint swelling or tenderness  Extremities: no pedal edema   Neurological: no focal deficits 89 yo f hx of COPD presents s/p cough and shortness of breath; symptoms x 2 days. No known fevers or chills. Pt unable to provide hx 2/2 SOB. She is + for Influenza, on supplemental O2. Elevated troponin and BNP on admission.     12/16- patient was seen and examined. Oriented x3- stated that she still feels SOB and coughing a lot. Denies CP.  12/17- pt still complaining of SOB and coughing, +wheezing. SOB improved from yesterday. ROS:   All 10 systems reviewed and found to be negative with the exception of what has been described above.   12/18 wheezing improved, Cr increased , patient feels a littel better today    PE:  Constitutional: NAD, laying in bed  HEENT: NC/AT  Back: no tenderness  Respiratory: respirations even and non labored, +expiratory wheezing  on supplemental O2   Cardiovascular: S1S2 regular, no murmurs  Abdomen: soft, not tender, not distended, positive BS  Genitourinary: voiding  Musculoskeletal: no muscle tenderness, no joint swelling or tenderness  Extremities: no pedal edema   Neurological: no focal deficits    labs reviewed

## 2021-12-19 DIAGNOSIS — R06.00 DYSPNEA, UNSPECIFIED: ICD-10-CM

## 2021-12-19 DIAGNOSIS — I05.0 RHEUMATIC MITRAL STENOSIS: ICD-10-CM

## 2021-12-19 LAB
ANION GAP SERPL CALC-SCNC: 3 MMOL/L — LOW (ref 5–17)
BASOPHILS # BLD AUTO: 0.01 K/UL — SIGNIFICANT CHANGE UP (ref 0–0.2)
BASOPHILS NFR BLD AUTO: 0.1 % — SIGNIFICANT CHANGE UP (ref 0–2)
BUN SERPL-MCNC: 58 MG/DL — HIGH (ref 7–23)
CALCIUM SERPL-MCNC: 9.5 MG/DL — SIGNIFICANT CHANGE UP (ref 8.5–10.1)
CHLORIDE SERPL-SCNC: 107 MMOL/L — SIGNIFICANT CHANGE UP (ref 96–108)
CO2 SERPL-SCNC: 29 MMOL/L — SIGNIFICANT CHANGE UP (ref 22–31)
CREAT SERPL-MCNC: 1.39 MG/DL — HIGH (ref 0.5–1.3)
EOSINOPHIL # BLD AUTO: 0 K/UL — SIGNIFICANT CHANGE UP (ref 0–0.5)
EOSINOPHIL NFR BLD AUTO: 0 % — SIGNIFICANT CHANGE UP (ref 0–6)
GLUCOSE SERPL-MCNC: 155 MG/DL — HIGH (ref 70–99)
HCT VFR BLD CALC: 36 % — SIGNIFICANT CHANGE UP (ref 34.5–45)
HGB BLD-MCNC: 11.5 G/DL — SIGNIFICANT CHANGE UP (ref 11.5–15.5)
IMM GRANULOCYTES NFR BLD AUTO: 0.5 % — SIGNIFICANT CHANGE UP (ref 0–1.5)
LYMPHOCYTES # BLD AUTO: 1.02 K/UL — SIGNIFICANT CHANGE UP (ref 1–3.3)
LYMPHOCYTES # BLD AUTO: 11.8 % — LOW (ref 13–44)
MCHC RBC-ENTMCNC: 29.3 PG — SIGNIFICANT CHANGE UP (ref 27–34)
MCHC RBC-ENTMCNC: 31.9 GM/DL — LOW (ref 32–36)
MCV RBC AUTO: 91.6 FL — SIGNIFICANT CHANGE UP (ref 80–100)
MONOCYTES # BLD AUTO: 0.43 K/UL — SIGNIFICANT CHANGE UP (ref 0–0.9)
MONOCYTES NFR BLD AUTO: 5 % — SIGNIFICANT CHANGE UP (ref 2–14)
NEUTROPHILS # BLD AUTO: 7.13 K/UL — SIGNIFICANT CHANGE UP (ref 1.8–7.4)
NEUTROPHILS NFR BLD AUTO: 82.6 % — HIGH (ref 43–77)
PLATELET # BLD AUTO: 279 K/UL — SIGNIFICANT CHANGE UP (ref 150–400)
POTASSIUM SERPL-MCNC: 4.6 MMOL/L — SIGNIFICANT CHANGE UP (ref 3.5–5.3)
POTASSIUM SERPL-SCNC: 4.6 MMOL/L — SIGNIFICANT CHANGE UP (ref 3.5–5.3)
RBC # BLD: 3.93 M/UL — SIGNIFICANT CHANGE UP (ref 3.8–5.2)
RBC # FLD: 13.4 % — SIGNIFICANT CHANGE UP (ref 10.3–14.5)
SODIUM SERPL-SCNC: 139 MMOL/L — SIGNIFICANT CHANGE UP (ref 135–145)
WBC # BLD: 8.63 K/UL — SIGNIFICANT CHANGE UP (ref 3.8–10.5)
WBC # FLD AUTO: 8.63 K/UL — SIGNIFICANT CHANGE UP (ref 3.8–10.5)

## 2021-12-19 PROCEDURE — 99232 SBSQ HOSP IP/OBS MODERATE 35: CPT

## 2021-12-19 PROCEDURE — 99223 1ST HOSP IP/OBS HIGH 75: CPT

## 2021-12-19 PROCEDURE — 76770 US EXAM ABDO BACK WALL COMP: CPT | Mod: 26

## 2021-12-19 RX ORDER — METOPROLOL TARTRATE 50 MG
50 TABLET ORAL DAILY
Refills: 0 | Status: DISCONTINUED | OUTPATIENT
Start: 2021-12-19 | End: 2021-12-21

## 2021-12-19 RX ORDER — FUROSEMIDE 40 MG
20 TABLET ORAL ONCE
Refills: 0 | Status: COMPLETED | OUTPATIENT
Start: 2021-12-19 | End: 2021-12-19

## 2021-12-19 RX ADMIN — HEPARIN SODIUM 5000 UNIT(S): 5000 INJECTION INTRAVENOUS; SUBCUTANEOUS at 21:10

## 2021-12-19 RX ADMIN — Medication 25 MILLIGRAM(S): at 21:11

## 2021-12-19 RX ADMIN — Medication 1000 UNIT(S): at 09:22

## 2021-12-19 RX ADMIN — BUDESONIDE AND FORMOTEROL FUMARATE DIHYDRATE 2 PUFF(S): 160; 4.5 AEROSOL RESPIRATORY (INHALATION) at 21:24

## 2021-12-19 RX ADMIN — Medication 40 MILLIGRAM(S): at 09:22

## 2021-12-19 RX ADMIN — Medication 20 MILLIGRAM(S): at 14:12

## 2021-12-19 RX ADMIN — CLOPIDOGREL BISULFATE 75 MILLIGRAM(S): 75 TABLET, FILM COATED ORAL at 09:22

## 2021-12-19 RX ADMIN — Medication 30 MILLIGRAM(S): at 09:23

## 2021-12-19 RX ADMIN — Medication 1 TABLET(S): at 09:22

## 2021-12-19 RX ADMIN — ATORVASTATIN CALCIUM 40 MILLIGRAM(S): 80 TABLET, FILM COATED ORAL at 21:11

## 2021-12-19 RX ADMIN — NYSTATIN CREAM 1 APPLICATION(S): 100000 CREAM TOPICAL at 09:23

## 2021-12-19 RX ADMIN — LATANOPROST 1 DROP(S): 0.05 SOLUTION/ DROPS OPHTHALMIC; TOPICAL at 21:10

## 2021-12-19 RX ADMIN — Medication 40 MILLIGRAM(S): at 21:21

## 2021-12-19 RX ADMIN — HEPARIN SODIUM 5000 UNIT(S): 5000 INJECTION INTRAVENOUS; SUBCUTANEOUS at 09:23

## 2021-12-19 RX ADMIN — BUDESONIDE AND FORMOTEROL FUMARATE DIHYDRATE 2 PUFF(S): 160; 4.5 AEROSOL RESPIRATORY (INHALATION) at 09:01

## 2021-12-19 RX ADMIN — Medication 10 MILLIGRAM(S): at 09:22

## 2021-12-19 RX ADMIN — NYSTATIN CREAM 1 APPLICATION(S): 100000 CREAM TOPICAL at 21:10

## 2021-12-19 NOTE — CONSULT NOTE ADULT - SUBJECTIVE AND OBJECTIVE BOX
89 yo f hx of COPD presents s/p cough and shortness of breath; symptoms x 2 days. No known fevers or chills. Pt unable to provide hx 2/2 SOB. She is + for Influenza, on supplemental O2. Elevated troponin and BNP on admission.   pt denies hx of CHF. recieved IV lasix last night   Renal eval called for rising Cr      patient feels a littel better today wheezing less after inhalers     echo noted severe MS and mod AS      PAST MEDICAL & SURGICAL HISTORY:  COPD without exacerbation  CAD (coronary artery disease)    Home Medications:  Advair Diskus 250 mcg-50 mcg inhalation powder: 1 puff(s) inhaled 2 times a day (16 Dec 2021 15:34)  Albuterol (Eqv-Proventil HFA) 90 mcg/inh inhalation aerosol: 2 puff(s) inhaled every 6 hours, As Needed (16 Dec 2021 15:34)  atorvastatin 40 mg oral tablet: 1 tab(s) orally once a day (at bedtime) (16 Dec 2021 15:34)  Calcium 600+D 600 mg-5 mcg (200 intl units) oral tablet: 1 tab(s) orally once a day (16 Dec 2021 15:34)  clopidogrel 75 mg oral tablet: 1 tab(s) orally once a day (16 Dec 2021 15:34)  enalapril 10 mg oral tablet: 1 tab(s) orally once a day (16 Dec 2021 15:34)  Fish Oil 1000 mg oral capsule: 2 cap(s) orally 2 times a day (16 Dec 2021 15:34)  Iferex 150 oral capsule: 1 cap(s) orally once a day (16 Dec 2021 15:34)  metoprolol tartrate 25 mg oral tablet: 2 tab(s) orally once a day (in the morning) (16 Dec 2021 15:34)  Metoprolol Tartrate 25 mg oral tablet: 1 tab(s) orally once a day (in the evening) (16 Dec 2021 15:34)  Multiple Vitamins oral tablet: 1 tab(s) orally once a day (16 Dec 2021 15:34)  potassium chloride 10 mEq oral tablet, extended release: 1 tab(s) orally once a day (at bedtime) (16 Dec 2021 15:34)  Silvadene 1% topical cream: Apply topically to affected area 2 times a day [Left leg] (16 Dec 2021 15:34)  travoprost 0.004% ophthalmic solution: 1 drop(s) to each affected eye once a day (in the evening) (16 Dec 2021 15:34)  Tylenol 8 HR Arthritis Pain 650 mg oral tablet, extended release: 1 tab(s) orally once a day (at bedtime) (16 Dec 2021 15:34)  Vitamin D3 25 mcg (1000 intl units) oral tablet: 1 tab(s) orally once a day (16 Dec 2021 15:34)  Voltaren 1% topical gel: Apply topically to affected area once a day [Bilateral knees] (16 Dec 2021 15:34)      MEDICATIONS  (STANDING):  atorvastatin 40 milliGRAM(s) Oral at bedtime  budesonide 160 MICROgram(s)/formoterol 4.5 MICROgram(s) Inhaler 2 Puff(s) Inhalation two times a day  cholecalciferol 1000 Unit(s) Oral daily  clopidogrel Tablet 75 milliGRAM(s) Oral daily  heparin   Injectable 5000 Unit(s) SubCutaneous every 12 hours  latanoprost 0.005% Ophthalmic Solution 1 Drop(s) Both EYES at bedtime  methylPREDNISolone sodium succinate Injectable 40 milliGRAM(s) IV Push every 12 hours  metoprolol tartrate 50 milliGRAM(s) Oral daily  metoprolol tartrate 25 milliGRAM(s) Oral at bedtime  multivitamin 1 Tablet(s) Oral daily  nystatin Powder 1 Application(s) Topical two times a day  oseltamivir 30 milliGRAM(s) Oral daily  pantoprazole    Tablet 40 milliGRAM(s) Oral before breakfast  potassium chloride    Tablet ER 10 milliEquivalent(s) Oral daily      Allergies    No Known Allergies    Intolerances        SOCIAL HISTORY:  Denies ETOh,Smoking,     FAMILY HISTORY:      REVIEW OF SYSTEMS:    CONSTITUTIONAL: No weakness, fevers or chills  EYES/ENT: No visual changes;  No vertigo or throat pain   NECK: No pain or stiffness  RESPIRATORY: No cough, wheezing, hemoptysis;  stable  shortness of breath  CARDIOVASCULAR: No chest pain or palpitations  GASTROINTESTINAL: No abdominal or epigastric pain. No nausea, vomiting, or hematemesis; No diarrhea or constipation. No melena or hematochezia.  GENITOURINARY: No dysuria, frequency or hematuria  NEUROLOGICAL: No numbness or weakness  SKIN: No itching, burning, rashes, or lesions   All other review of systems is negative unless indicated above.    VITAL:  T(C): , Max: 36.5 (12-19-21 @ 16:45)  T(F): , Max: 97.7 (12-19-21 @ 16:45)  HR: 72 (12-19-21 @ 16:45)  BP: 137/52 (12-19-21 @ 16:45)  BP(mean): --  RR: 18 (12-19-21 @ 16:45)  SpO2: 100% (12-19-21 @ 16:45)  Wt(kg): --    I and O's:        PHYSICAL EXAM:    Constitutional: frail  HEENT:  EOMI,  MMM  Neck: No LAD, No JVD  Respiratory: poor ae bilat w wheezing   Cardiovascular: S1 and S2  Gastrointestinal: BS+, soft, NT/ND  Extremities: No peripheral edema  Neurological: A/O x 3, no focal deficits  : No Sosa  Skin: No rashes  Access: Not applicable    LABS:               139    |  107    |  58     ----------------------------<  155       19 Dec 2021 06:20  4.6     |  29     |  1.39     140    |  107    |  55     ----------------------------<  193       18 Dec 2021 11:24  4.9     |  28     |  1.51     139    |  109    |  28     ----------------------------<  120       16 Dec 2021 09:40  4.3     |  26     |  1.08     Ca    9.5        19 Dec 2021 06:20  Ca    9.1        18 Dec 2021 11:24                     11.5   8.63  )-----------( 279      ( 19 Dec 2021 06:20 )             36.0           Urine Studies:          RADIOLOGY & ADDITIONAL STUDIES:

## 2021-12-19 NOTE — CONSULT NOTE ADULT - PROBLEM SELECTOR RECOMMENDATION 2
Probably chronic. Doubt contributory to symptoms now improving with rest and antivirals. Doubt that patient is a candidate for invasive intervention. Continue metoprolol. Will follow as needed.

## 2021-12-19 NOTE — CONSULT NOTE ADULT - ASSESSMENT
89 yo female with hx of COPD, presenting with sob + flu, now rising Cr    GIOVANNA    in setting of flu = Prerenal ?  - while on  ACE and Lasix    hold further IV lasix and reassess   in setting of severe MS and mod AS could effect renal perfusion    clinically resp status improving   hold ACE for now   trend Cr   urine studies, urine eos   uss kidney, bladder     d/w dr Rodriguez and Rosaura    ** pt seen earlier today     Thank you for the courtesy of this consult. We will follow this patient with you.   Management is subject to change if new information becomes available or patient condition changes.

## 2021-12-19 NOTE — PROGRESS NOTE ADULT - SUBJECTIVE AND OBJECTIVE BOX
89 yo f hx of COPD presents s/p cough and shortness of breath; symptoms x 2 days. No known fevers or chills. Pt unable to provide hx 2/2 SOB. She is + for Influenza, on supplemental O2. Elevated troponin and BNP on admission.     12/16- patient was seen and examined. Oriented x3- stated that she still feels SOB and coughing a lot. Denies CP.  12/17- pt still complaining of SOB and coughing, +wheezing. SOB improved from yesterday. ROS:   All 10 systems reviewed and found to be negative with the exception of what has been described above.   12/19 wheezing improved, Cr increased , patient feels a littel better today    PE:  Constitutional: NAD, laying in bed  HEENT: NC/AT  Back: no tenderness  Respiratory: respirations even and non labored, +expiratory wheezing  on supplemental O2   Cardiovascular: S1S2 regular, no murmurs  Abdomen: soft, not tender, not distended, positive BS  Genitourinary: voiding  Musculoskeletal: no muscle tenderness, no joint swelling or tenderness  Extremities: no pedal edema   Neurological: no focal deficits    labs reviewed 89 yo f hx of COPD presents s/p cough and shortness of breath; symptoms x 2 days. No known fevers or chills. Pt unable to provide hx 2/2 SOB. She is + for Influenza, on supplemental O2. Elevated troponin and BNP on admission.     12/16- patient was seen and examined. Oriented x3- stated that she still feels SOB and coughing a lot. Denies CP.  12/17- pt still complaining of SOB and coughing, +wheezing. SOB improved from yesterday. ROS:   All 10 systems reviewed and found to be negative with the exception of what has been described above.   12/19 wheezing improved, Cr increased , patient feels a little better today    PE:  Constitutional: NAD, laying in bed  HEENT: NC/AT  Back: no tenderness  Respiratory: respirations even and non labored, +expiratory wheezing but improving air entry   on supplemental O2   Cardiovascular: S1S2 regular, no murmurs  Abdomen: soft, not tender, not distended, positive BS  Genitourinary: voiding  Musculoskeletal: no muscle tenderness, no joint swelling or tenderness  Extremities: no pedal edema   Neurological: no focal deficits    labs reviewed

## 2021-12-19 NOTE — CONSULT NOTE ADULT - SUBJECTIVE AND OBJECTIVE BOX
PCP:    REQUESTING PHYSICIAN:    REASON FOR CONSULT:    CHIEF COMPLAINT:    HPI:  89yo f hx of COPD presents s/p cough and shortness of breath; symptoms x 2 days. no known fevers or chills. pt unable to provide hx 2/2 SOB. She is + for flu. Feels better on O2 and has no c/o. Pt has been treated with Tamiflu and feels much improved. Pt denies exertional complaints, although she ambulates with a walker. She doesn't recall seeing a cardiologist. She denies chest pain.      (15 Dec 2021 12:22)      PAST MEDICAL & SURGICAL HISTORY:  COPD without exacerbation    CAD (coronary artery disease)        Allergies    No Known Allergies    Intolerances        SOCIAL HISTORY:    FAMILY HISTORY:      MEDICATIONS:  MEDICATIONS  (STANDING):  atorvastatin 40 milliGRAM(s) Oral at bedtime  budesonide 160 MICROgram(s)/formoterol 4.5 MICROgram(s) Inhaler 2 Puff(s) Inhalation two times a day  cholecalciferol 1000 Unit(s) Oral daily  clopidogrel Tablet 75 milliGRAM(s) Oral daily  furosemide   Injectable 20 milliGRAM(s) IV Push once  heparin   Injectable 5000 Unit(s) SubCutaneous every 12 hours  latanoprost 0.005% Ophthalmic Solution 1 Drop(s) Both EYES at bedtime  methylPREDNISolone sodium succinate Injectable 40 milliGRAM(s) IV Push every 12 hours  metoprolol tartrate 50 milliGRAM(s) Oral daily  metoprolol tartrate 25 milliGRAM(s) Oral at bedtime  multivitamin 1 Tablet(s) Oral daily  nystatin Powder 1 Application(s) Topical two times a day  oseltamivir 30 milliGRAM(s) Oral daily  pantoprazole    Tablet 40 milliGRAM(s) Oral before breakfast  potassium chloride    Tablet ER 10 milliEquivalent(s) Oral daily    MEDICATIONS  (PRN):  acetaminophen     Tablet .. 650 milliGRAM(s) Oral every 6 hours PRN Temp greater or equal to 38C (100.4F), Mild Pain (1 - 3)  ALBUTerol    90 MICROgram(s) HFA Inhaler 2 Puff(s) Inhalation every 6 hours PRN Shortness of Breath and/or Wheezing  aluminum hydroxide/magnesium hydroxide/simethicone Suspension 30 milliLiter(s) Oral every 4 hours PRN Dyspepsia  melatonin 3 milliGRAM(s) Oral at bedtime PRN Insomnia  ondansetron Injectable 4 milliGRAM(s) IV Push every 8 hours PRN Nausea and/or Vomiting      REVIEW OF SYSTEMS:    CONSTITUTIONAL: No weakness, fevers or chills  EYES/ENT: No visual changes;  No vertigo or throat pain   NECK: No pain or stiffness  RESPIRATORY: Shortness of breath  CARDIOVASCULAR: No chest pain or palpitations  GASTROINTESTINAL: No abdominal or epigastric pain. No nausea, vomiting, or hematemesis; No diarrhea or constipation. No melena or hematochezia.  GENITOURINARY: No dysuria, frequency or hematuria  NEUROLOGICAL: No numbness or weakness  SKIN: No itching, burning, rashes, or lesions   All other review of systems is negative unless indicated above    Vital Signs Last 24 Hrs  T(C): 36.3 (19 Dec 2021 08:37), Max: 36.5 (18 Dec 2021 17:37)  T(F): 97.4 (19 Dec 2021 08:37), Max: 97.7 (18 Dec 2021 17:37)  HR: 59 (19 Dec 2021 08:37) (59 - 80)  BP: 150/70 (19 Dec 2021 08:37) (133/76 - 150/70)  BP(mean): --  RR: 18 (19 Dec 2021 08:37) (18 - 19)  SpO2: 99% (18 Dec 2021 21:24) (99% - 99%)    I&O's Summary      PHYSICAL EXAM:    Constitutional: NAD, awake and alert, well-developed  HEENT: PERR, EOMI,  No oral cyananosis.  Neck:  supple,  No JVD  Respiratory: Breath sounds are clear bilaterally, No wheezing, rales or rhonchi  Cardiovascular: S1 and S2, regular rate and rhythm, 1/6 diastolic  Gastrointestinal: Bowel Sounds present, soft, nontender.   Extremities: No peripheral edema. No clubbing or cyanosis.  Vascular: 2+ peripheral pulses  Neurological: A/O x 3, no focal deficits  Musculoskeletal: no calf tenderness.  Skin: No rashes.      LABS: All Labs Reviewed:                        11.5   8.63  )-----------( 279      ( 19 Dec 2021 06:20 )             36.0                         11.1   5.10  )-----------( 261      ( 18 Dec 2021 11:24 )             34.7     19 Dec 2021 06:20    139    |  107    |  58     ----------------------------<  155    4.6     |  29     |  1.39   18 Dec 2021 11:24    140    |  107    |  55     ----------------------------<  193    4.9     |  28     |  1.51     Ca    9.5        19 Dec 2021 06:20  Ca    9.1        18 Dec 2021 11:24            Blood Culture: Organism --  Gram Stain Blood -- Gram Stain --  Specimen Source .Blood None  Culture-Blood --      12-18 @ 11:24  Pro Bnp 3009        RADIOLOGY/EKG:< from: 12 Lead ECG (12.17.21 @ 07:59) >  Diagnosis Line Sinus rhythm with Premature atrial complexes  Otherwise normal ECG  Confirmed by GERSON FOSS MD ([id number]) on 12/18/2021 5:03:49 PM    < end of copied text >

## 2021-12-19 NOTE — CONSULT NOTE ADULT - ASSESSMENT
PROBLEMS:    SOB, acute hypoxic resp failure sec to Influenza AH3   COPD exacerbation  GIOVANNA prerenal from CHF vs 2ndry to enalapril  elevated BNP  * Cardiomegaly  * Elevated troponin likely related Demand ischemia      PLAN:    Tamiflu  supplemental O2  nebs  monitor sats  +wheezing this AM- solumedrol IV started.   - as per her son - they have visitors from out of town who has covid. patient is negative for COVID.  cardio and pulm consult     elevated BNP  check CXR- if CXR shows pulm edema will give trail of lasix IV   bladder scan   hold enalapril      TTE- EF wnl  stable     - trend trops  - trending down    PROBLEMS:    SOB, acute hypoxic resp failure sec to Influenza AH3   AC COPD exacerbation  GIOVANNA prerenal from CHF vs 2ndry to enalapril  elevated BNP  Cardiomegaly  Elevated troponin likely related Demand ischemia      PLAN:    Tamiflu  supplemental O2  nebs  monitor sats  IV solumedrol 40mg q12hr   elevated BNP  Trend trops-trending down   DVT prophylasix

## 2021-12-19 NOTE — CONSULT NOTE ADULT - PROBLEM SELECTOR RECOMMENDATION 9
Probably secondary to viral infection. Would avoid diuresis at this time. Echo reveals normal EF but severe MS and moderate AS.

## 2021-12-19 NOTE — PROGRESS NOTE ADULT - ASSESSMENT
* SOB, acute hypoxic resp failure sec to Influenza AH3   COPD exacerbation  cannot exclude CHF exacerbation  Tamiflu  supplemental O2  nebs  monitor sats  +wheezing this AM- solumedrol IV started.   - as per her son - they have visitors from out of town who has covid. patient is negative for COVID.  cardio and pulm consult     GIOVANNA prerenal from CHF vs 2ndry to enalapril  elevated BNP  check CXR- if CXR shows pulm edema s/p IV lasix   bladder scan   hold enalapril      * Cardiomegaly  severe MS , AS  TTE- EF wnl  stable   no furtherIV lasix per cardio    * Elevated troponin likely related Demand ischemia  - trend trops  - trending down     d/w son call [phone]  Full code   * SOB, acute hypoxic resp failure sec to Influenza AH3   COPD exacerbation  elevated pro BNP , without CHF exacerbation   renally dosed Tamiflu - complete on 12/20  supplemental O2  nebs  on  solumedrol IV 40  BID - consider reducing steroid dose on 12/20 if pt continues to improve   - as per her son - they have visitors from out of town who has covid. patient is negative for COVID.  cardio and pulm consult appreciated     GIOVANNA prerenal  2ndry to enalapril and trial of lasix   elevated BNP without CHF exacerbation   s/p trial  low dose lasix 20 mg x2 ,   CXR unlikely CHF   bladder scan - pt voiding   hold enalapril, no further lasix       * Cardiomegaly  severe MS , AS  TTE- EF wnl  stable   no further  lasix per cardio  not candidate for invasive intervention for mitral stenosis     * Elevated troponin likely related Demand ischemia  - trending down      son  [phone]  Full code    dispo- stop tamiflu on 12/20 ,consider  reducing  steroid dose 12/20, monitor GIOVANNA  , ACEI held , no further lasix needed

## 2021-12-19 NOTE — CONSULT NOTE ADULT - SUBJECTIVE AND OBJECTIVE BOX
HPI:    89 y.o.f of COPD presents s/p cough and shortness of breath; symptoms x 2 days. no known fevers or chills. pt unable to provide hx 2/2 SOB. She is + for flu. Feels better on O2 and has no c/o. pat seen for pulmonary eval for persistant wheezing.    PAST MEDICAL & SURGICAL HISTORY:  COPD without exacerbation    CAD (coronary artery disease)        Home Medications:  Advair Diskus 250 mcg-50 mcg inhalation powder: 1 puff(s) inhaled 2 times a day (16 Dec 2021 15:34)  Albuterol (Eqv-Proventil HFA) 90 mcg/inh inhalation aerosol: 2 puff(s) inhaled every 6 hours, As Needed (16 Dec 2021 15:34)  atorvastatin 40 mg oral tablet: 1 tab(s) orally once a day (at bedtime) (16 Dec 2021 15:34)  Calcium 600+D 600 mg-5 mcg (200 intl units) oral tablet: 1 tab(s) orally once a day (16 Dec 2021 15:34)  clopidogrel 75 mg oral tablet: 1 tab(s) orally once a day (16 Dec 2021 15:34)  enalapril 10 mg oral tablet: 1 tab(s) orally once a day (16 Dec 2021 15:34)  Fish Oil 1000 mg oral capsule: 2 cap(s) orally 2 times a day (16 Dec 2021 15:34)  Iferex 150 oral capsule: 1 cap(s) orally once a day (16 Dec 2021 15:34)  metoprolol tartrate 25 mg oral tablet: 2 tab(s) orally once a day (in the morning) (16 Dec 2021 15:34)  Metoprolol Tartrate 25 mg oral tablet: 1 tab(s) orally once a day (in the evening) (16 Dec 2021 15:34)  Multiple Vitamins oral tablet: 1 tab(s) orally once a day (16 Dec 2021 15:34)  potassium chloride 10 mEq oral tablet, extended release: 1 tab(s) orally once a day (at bedtime) (16 Dec 2021 15:34)  Silvadene 1% topical cream: Apply topically to affected area 2 times a day [Left leg] (16 Dec 2021 15:34)  travoprost 0.004% ophthalmic solution: 1 drop(s) to each affected eye once a day (in the evening) (16 Dec 2021 15:34)  Tylenol 8 HR Arthritis Pain 650 mg oral tablet, extended release: 1 tab(s) orally once a day (at bedtime) (16 Dec 2021 15:34)  Vitamin D3 25 mcg (1000 intl units) oral tablet: 1 tab(s) orally once a day (16 Dec 2021 15:34)  Voltaren 1% topical gel: Apply topically to affected area once a day [Bilateral knees] (16 Dec 2021 15:34)      MEDICATIONS  (STANDING):  atorvastatin 40 milliGRAM(s) Oral at bedtime  budesonide 160 MICROgram(s)/formoterol 4.5 MICROgram(s) Inhaler 2 Puff(s) Inhalation two times a day  cholecalciferol 1000 Unit(s) Oral daily  clopidogrel Tablet 75 milliGRAM(s) Oral daily  enalapril 10 milliGRAM(s) Oral daily  heparin   Injectable 5000 Unit(s) SubCutaneous every 12 hours  latanoprost 0.005% Ophthalmic Solution 1 Drop(s) Both EYES at bedtime  methylPREDNISolone sodium succinate Injectable 40 milliGRAM(s) IV Push every 12 hours  metoprolol tartrate 50 milliGRAM(s) Oral daily  metoprolol tartrate 25 milliGRAM(s) Oral at bedtime  multivitamin 1 Tablet(s) Oral daily  nystatin Powder 1 Application(s) Topical two times a day  oseltamivir 30 milliGRAM(s) Oral daily  pantoprazole    Tablet 40 milliGRAM(s) Oral before breakfast  potassium chloride    Tablet ER 10 milliEquivalent(s) Oral daily    MEDICATIONS  (PRN):  acetaminophen     Tablet .. 650 milliGRAM(s) Oral every 6 hours PRN Temp greater or equal to 38C (100.4F), Mild Pain (1 - 3)  ALBUTerol    90 MICROgram(s) HFA Inhaler 2 Puff(s) Inhalation every 6 hours PRN Shortness of Breath and/or Wheezing  aluminum hydroxide/magnesium hydroxide/simethicone Suspension 30 milliLiter(s) Oral every 4 hours PRN Dyspepsia  melatonin 3 milliGRAM(s) Oral at bedtime PRN Insomnia  ondansetron Injectable 4 milliGRAM(s) IV Push every 8 hours PRN Nausea and/or Vomiting      Allergies    No Known Allergies    Intolerances        SOCIAL HISTORY: Denies tobacco, etoh abuse or illicit drug use    FAMILY HISTORY:      Vital Signs Last 24 Hrs  T(C): 36.3 (19 Dec 2021 08:37), Max: 36.5 (18 Dec 2021 17:37)  T(F): 97.4 (19 Dec 2021 08:37), Max: 97.7 (18 Dec 2021 17:37)  HR: 59 (19 Dec 2021 08:37) (59 - 80)  BP: 150/70 (19 Dec 2021 08:37) (133/76 - 166/65)  BP(mean): --  RR: 18 (19 Dec 2021 08:37) (18 - 19)  SpO2: 99% (18 Dec 2021 21:24) (99% - 100%)        REVIEW OF SYSTEMS:    CONSTITUTIONAL:  As per HPI.  HEENT:  Eyes:  No diplopia or blurred vision. ENT:  No earache, sore throat or runny nose.  CARDIOVASCULAR:  No pressure, squeezing, tightness, heaviness or aching about the chest, neck, axilla or epigastrium.  RESPIRATORY:  No cough, shortness of breath, PND or orthopnea.  GASTROINTESTINAL:  No nausea, vomiting or diarrhea.  GENITOURINARY:  No dysuria, frequency or urgency.  MUSCULOSKELETAL:  As per HPI.  SKIN:  No change in skin, hair or nails.  NEUROLOGIC:  No paresthesias, fasciculations, seizures or weakness.  PSYCHIATRIC:  No disorder of thought or mood.  ENDOCRINE:  No heat or cold intolerance, polyuria or polydipsia.  HEMATOLOGICAL:  No easy bruising or bleedings:  .     PHYSICAL EXAMINATION:    GENERAL APPEARANCE:  Pt. is not currently dyspneic, in no distress. Pt. is alert, oriented, and pleasant.  HEENT:  Pupils are normal and react normally. No icterus. Mucous membranes well colored.  NECK:  Supple. No lymphadenopathy. Jugular venous pressure not elevated. Carotids equal.   HEART:   The cardiac impulse has a normal quality. Regular. Normal S1 and S2. There are no murmurs, rubs or gallops noted  CHEST:  Chest is clear to auscultation. Normal respiratory effort.  ABDOMEN:  Soft and nontender.   EXTREMITIES:  There is no cyanosis, clubbing or edema.   SKIN:  No rash or significant lesions are noted.    LABS:                        11.5   8.63  )-----------( 279      ( 19 Dec 2021 06:20 )             36.0     12-19    139  |  107  |  58<H>  ----------------------------<  155<H>  4.6   |  29  |  1.39<H>    Ca    9.5      19 Dec 2021 06:20    RADIOLOGY & ADDITIONAL STUDIES:        HPI:    89 y.o.f of COPD admitted s/p cough and shortness of breath; symptoms x 2 days. no known fevers or chills. pt unable to provide hx 2/2 SOB. She is + for flu. Feels better on O2 and has no c/o. pat seen for pulmonary eval for persistant wheezing.    PAST MEDICAL & SURGICAL HISTORY:  COPD without exacerbation    CAD (coronary artery disease)        Home Medications:  Advair Diskus 250 mcg-50 mcg inhalation powder: 1 puff(s) inhaled 2 times a day (16 Dec 2021 15:34)  Albuterol (Eqv-Proventil HFA) 90 mcg/inh inhalation aerosol: 2 puff(s) inhaled every 6 hours, As Needed (16 Dec 2021 15:34)  atorvastatin 40 mg oral tablet: 1 tab(s) orally once a day (at bedtime) (16 Dec 2021 15:34)  Calcium 600+D 600 mg-5 mcg (200 intl units) oral tablet: 1 tab(s) orally once a day (16 Dec 2021 15:34)  clopidogrel 75 mg oral tablet: 1 tab(s) orally once a day (16 Dec 2021 15:34)  enalapril 10 mg oral tablet: 1 tab(s) orally once a day (16 Dec 2021 15:34)  Fish Oil 1000 mg oral capsule: 2 cap(s) orally 2 times a day (16 Dec 2021 15:34)  Iferex 150 oral capsule: 1 cap(s) orally once a day (16 Dec 2021 15:34)  metoprolol tartrate 25 mg oral tablet: 2 tab(s) orally once a day (in the morning) (16 Dec 2021 15:34)  Metoprolol Tartrate 25 mg oral tablet: 1 tab(s) orally once a day (in the evening) (16 Dec 2021 15:34)  Multiple Vitamins oral tablet: 1 tab(s) orally once a day (16 Dec 2021 15:34)  potassium chloride 10 mEq oral tablet, extended release: 1 tab(s) orally once a day (at bedtime) (16 Dec 2021 15:34)  Silvadene 1% topical cream: Apply topically to affected area 2 times a day [Left leg] (16 Dec 2021 15:34)  travoprost 0.004% ophthalmic solution: 1 drop(s) to each affected eye once a day (in the evening) (16 Dec 2021 15:34)  Tylenol 8 HR Arthritis Pain 650 mg oral tablet, extended release: 1 tab(s) orally once a day (at bedtime) (16 Dec 2021 15:34)  Vitamin D3 25 mcg (1000 intl units) oral tablet: 1 tab(s) orally once a day (16 Dec 2021 15:34)  Voltaren 1% topical gel: Apply topically to affected area once a day [Bilateral knees] (16 Dec 2021 15:34)      MEDICATIONS  (STANDING):  atorvastatin 40 milliGRAM(s) Oral at bedtime  budesonide 160 MICROgram(s)/formoterol 4.5 MICROgram(s) Inhaler 2 Puff(s) Inhalation two times a day  cholecalciferol 1000 Unit(s) Oral daily  clopidogrel Tablet 75 milliGRAM(s) Oral daily  enalapril 10 milliGRAM(s) Oral daily  heparin   Injectable 5000 Unit(s) SubCutaneous every 12 hours  latanoprost 0.005% Ophthalmic Solution 1 Drop(s) Both EYES at bedtime  methylPREDNISolone sodium succinate Injectable 40 milliGRAM(s) IV Push every 12 hours  metoprolol tartrate 50 milliGRAM(s) Oral daily  metoprolol tartrate 25 milliGRAM(s) Oral at bedtime  multivitamin 1 Tablet(s) Oral daily  nystatin Powder 1 Application(s) Topical two times a day  oseltamivir 30 milliGRAM(s) Oral daily  pantoprazole    Tablet 40 milliGRAM(s) Oral before breakfast  potassium chloride    Tablet ER 10 milliEquivalent(s) Oral daily    MEDICATIONS  (PRN):  acetaminophen     Tablet .. 650 milliGRAM(s) Oral every 6 hours PRN Temp greater or equal to 38C (100.4F), Mild Pain (1 - 3)  ALBUTerol    90 MICROgram(s) HFA Inhaler 2 Puff(s) Inhalation every 6 hours PRN Shortness of Breath and/or Wheezing  aluminum hydroxide/magnesium hydroxide/simethicone Suspension 30 milliLiter(s) Oral every 4 hours PRN Dyspepsia  melatonin 3 milliGRAM(s) Oral at bedtime PRN Insomnia  ondansetron Injectable 4 milliGRAM(s) IV Push every 8 hours PRN Nausea and/or Vomiting      Allergies    No Known Allergies    Intolerances        SOCIAL HISTORY: Denies tobacco, etoh abuse or illicit drug use    FAMILY HISTORY:      Vital Signs Last 24 Hrs  T(C): 36.3 (19 Dec 2021 08:37), Max: 36.5 (18 Dec 2021 17:37)  T(F): 97.4 (19 Dec 2021 08:37), Max: 97.7 (18 Dec 2021 17:37)  HR: 59 (19 Dec 2021 08:37) (59 - 80)  BP: 150/70 (19 Dec 2021 08:37) (133/76 - 166/65)  BP(mean): --  RR: 18 (19 Dec 2021 08:37) (18 - 19)  SpO2: 99% (18 Dec 2021 21:24) (99% - 100%)        REVIEW OF SYSTEMS:    CONSTITUTIONAL:  As per HPI.  HEENT:  Eyes:  No diplopia or blurred vision. ENT:  No earache, sore throat or runny nose.  CARDIOVASCULAR:  No pressure, squeezing, tightness, heaviness or aching about the chest, neck, axilla or epigastrium.  RESPIRATORY:  No cough, +shortness of breath, PND or orthopnea. wheezing+  GASTROINTESTINAL:  No nausea, vomiting or diarrhea.  GENITOURINARY:  No dysuria, frequency or urgency.  MUSCULOSKELETAL:  As per HPI.  SKIN:  No change in skin, hair or nails.  NEUROLOGIC:  No paresthesias, fasciculations, seizures or weakness.  PSYCHIATRIC:  No disorder of thought or mood.  ENDOCRINE:  No heat or cold intolerance, polyuria or polydipsia.  HEMATOLOGICAL:  No easy bruising or bleedings:  .     PHYSICAL EXAMINATION:    GENERAL APPEARANCE:  Pt. is not currently dyspneic, in no distress. Pt. is alert, oriented, and pleasant.  HEENT:  Pupils are normal and react normally. No icterus. Mucous membranes well colored.  NECK:  Supple. No lymphadenopathy. Jugular venous pressure not elevated. Carotids equal.   HEART:   The cardiac impulse has a normal quality. Regular. Normal S1 and S2. There are no murmurs, rubs or gallops noted  CHEST:  Chest rhonchi to auscultation. Normal respiratory effort.  ABDOMEN:  Soft and nontender.   EXTREMITIES:  There is no cyanosis, clubbing or edema.   SKIN:  No rash or significant lesions are noted.    LABS:                        11.5   8.63  )-----------( 279      ( 19 Dec 2021 06:20 )             36.0     12-19    139  |  107  |  58<H>  ----------------------------<  155<H>  4.6   |  29  |  1.39<H>    Ca    9.5      19 Dec 2021 06:20    RADIOLOGY & ADDITIONAL STUDIES:

## 2021-12-20 LAB
ANION GAP SERPL CALC-SCNC: 4 MMOL/L — LOW (ref 5–17)
APPEARANCE UR: CLEAR — SIGNIFICANT CHANGE UP
BILIRUB UR-MCNC: NEGATIVE — SIGNIFICANT CHANGE UP
BUN SERPL-MCNC: 55 MG/DL — HIGH (ref 7–23)
CALCIUM SERPL-MCNC: 9.4 MG/DL — SIGNIFICANT CHANGE UP (ref 8.5–10.1)
CHLORIDE SERPL-SCNC: 107 MMOL/L — SIGNIFICANT CHANGE UP (ref 96–108)
CO2 SERPL-SCNC: 31 MMOL/L — SIGNIFICANT CHANGE UP (ref 22–31)
COLOR SPEC: YELLOW — SIGNIFICANT CHANGE UP
CREAT ?TM UR-MCNC: 52 MG/DL — SIGNIFICANT CHANGE UP
CREAT SERPL-MCNC: 1.2 MG/DL — SIGNIFICANT CHANGE UP (ref 0.5–1.3)
CULTURE RESULTS: SIGNIFICANT CHANGE UP
CULTURE RESULTS: SIGNIFICANT CHANGE UP
DIFF PNL FLD: NEGATIVE — SIGNIFICANT CHANGE UP
EOSINOPHIL NFR URNS MANUAL: NEGATIVE — SIGNIFICANT CHANGE UP
GLUCOSE SERPL-MCNC: 147 MG/DL — HIGH (ref 70–99)
GLUCOSE UR QL: NEGATIVE MG/DL — SIGNIFICANT CHANGE UP
KETONES UR-MCNC: NEGATIVE — SIGNIFICANT CHANGE UP
LEUKOCYTE ESTERASE UR-ACNC: ABNORMAL
NITRITE UR-MCNC: NEGATIVE — SIGNIFICANT CHANGE UP
PH UR: 5 — SIGNIFICANT CHANGE UP (ref 5–8)
POTASSIUM SERPL-MCNC: 4.9 MMOL/L — SIGNIFICANT CHANGE UP (ref 3.5–5.3)
POTASSIUM SERPL-SCNC: 4.9 MMOL/L — SIGNIFICANT CHANGE UP (ref 3.5–5.3)
PROT ?TM UR-MCNC: 11 MG/DL — SIGNIFICANT CHANGE UP (ref 0–12)
PROT UR-MCNC: 15 MG/DL
PROT/CREAT UR-RTO: 0.2 RATIO — SIGNIFICANT CHANGE UP (ref 0–0.2)
SODIUM SERPL-SCNC: 142 MMOL/L — SIGNIFICANT CHANGE UP (ref 135–145)
SP GR SPEC: 1.01 — SIGNIFICANT CHANGE UP (ref 1.01–1.02)
SPECIMEN SOURCE: SIGNIFICANT CHANGE UP
SPECIMEN SOURCE: SIGNIFICANT CHANGE UP
UROBILINOGEN FLD QL: NEGATIVE MG/DL — SIGNIFICANT CHANGE UP

## 2021-12-20 PROCEDURE — 99232 SBSQ HOSP IP/OBS MODERATE 35: CPT

## 2021-12-20 RX ORDER — POLYETHYLENE GLYCOL 3350 17 G/17G
17 POWDER, FOR SOLUTION ORAL DAILY
Refills: 0 | Status: DISCONTINUED | OUTPATIENT
Start: 2021-12-20 | End: 2021-12-21

## 2021-12-20 RX ADMIN — POLYETHYLENE GLYCOL 3350 17 GRAM(S): 17 POWDER, FOR SOLUTION ORAL at 16:45

## 2021-12-20 RX ADMIN — PANTOPRAZOLE SODIUM 40 MILLIGRAM(S): 20 TABLET, DELAYED RELEASE ORAL at 09:35

## 2021-12-20 RX ADMIN — Medication 30 MILLIGRAM(S): at 09:32

## 2021-12-20 RX ADMIN — Medication 40 MILLIGRAM(S): at 09:31

## 2021-12-20 RX ADMIN — Medication 50 MILLIGRAM(S): at 09:32

## 2021-12-20 RX ADMIN — Medication 25 MILLIGRAM(S): at 21:20

## 2021-12-20 RX ADMIN — HEPARIN SODIUM 5000 UNIT(S): 5000 INJECTION INTRAVENOUS; SUBCUTANEOUS at 21:29

## 2021-12-20 RX ADMIN — BUDESONIDE AND FORMOTEROL FUMARATE DIHYDRATE 2 PUFF(S): 160; 4.5 AEROSOL RESPIRATORY (INHALATION) at 09:35

## 2021-12-20 RX ADMIN — Medication 1000 UNIT(S): at 09:32

## 2021-12-20 RX ADMIN — NYSTATIN CREAM 1 APPLICATION(S): 100000 CREAM TOPICAL at 09:36

## 2021-12-20 RX ADMIN — NYSTATIN CREAM 1 APPLICATION(S): 100000 CREAM TOPICAL at 21:29

## 2021-12-20 RX ADMIN — CLOPIDOGREL BISULFATE 75 MILLIGRAM(S): 75 TABLET, FILM COATED ORAL at 09:32

## 2021-12-20 RX ADMIN — LATANOPROST 1 DROP(S): 0.05 SOLUTION/ DROPS OPHTHALMIC; TOPICAL at 21:28

## 2021-12-20 RX ADMIN — Medication 1 TABLET(S): at 09:32

## 2021-12-20 RX ADMIN — ATORVASTATIN CALCIUM 40 MILLIGRAM(S): 80 TABLET, FILM COATED ORAL at 21:20

## 2021-12-20 RX ADMIN — BUDESONIDE AND FORMOTEROL FUMARATE DIHYDRATE 2 PUFF(S): 160; 4.5 AEROSOL RESPIRATORY (INHALATION) at 21:11

## 2021-12-20 RX ADMIN — HEPARIN SODIUM 5000 UNIT(S): 5000 INJECTION INTRAVENOUS; SUBCUTANEOUS at 09:32

## 2021-12-20 RX ADMIN — Medication 30 MILLIGRAM(S): at 21:20

## 2021-12-20 NOTE — PROGRESS NOTE ADULT - ASSESSMENT
89 yo female with hx of COPD, presenting with sob + flu, now rising Cr    GIOVANNA - resolving     in setting of flu = Prerenal ?  - while on  ACE and Lasix     in setting of severe MS and mod AS could effect renal perfusion    clinically resp status improving   hold ACE for now   trend Cr   uss kidney, bladder neg    urine is bland, neg eos     will no longer follow actively    recall if needed       ** pt seen earlier today

## 2021-12-20 NOTE — PROGRESS NOTE ADULT - ASSESSMENT
PROBLEMS:    SOB, acute hypoxic resp failure sec to Influenza AH3   AC COPD exacerbation  GIOVANNA prerenal from CHF vs 2ndry to enalapril  elevated BNP  Cardiomegaly  Elevated troponin likely related Demand ischemia      PLAN:    Tamiflu  supplemental O2  nebs  monitor sats  IV solumedrol 40mg q12hr   elevated BNP  Trend trops-trending down   DVT prophylasix   PROBLEMS:    SOB, acute hypoxic resp failure sec to Influenza AH3   AC COPD exacerbation  GIOVANNA prerenal from CHF vs 2ndry to enalapril  elevated BNP  Cardiomegaly  Elevated troponin likely related Demand ischemia      PLAN:    Tamiflu  supplemental O2  nebs  monitor sats  taper IV solumedrol 40mg qdaily   elevated BNP  Trend trops-trending down   DVT prophylasix

## 2021-12-20 NOTE — PROGRESS NOTE ADULT - ASSESSMENT
* SOB, acute hypoxic resp failure sec to Influenza AH3   COPD exacerbation  elevated pro BNP , without CHF exacerbation   renally dosed Tamiflu - complete on 12/20  supplemental O2  nebs  on  solumedrol IV 40  BID - titrated down   - as per her son - they have visitors from out of town who has covid. patient is negative for COVID.  cardio and pulm consult appreciated     GIOVANNA prerenal  2ndry to enalapril and trial of lasix - improved   elevated BNP without CHF exacerbation   s/p trial  low dose lasix 20 mg x2 ,   CXR unlikely CHF   bladder scan - pt voiding   hold enalapril, no further lasix       * Cardiomegaly  severe MS , AS  TTE- EF wnl  stable   no further  lasix per cardio  not candidate for invasive intervention for mitral stenosis     * Elevated troponin likely related Demand ischemia  - trending down      son  [phone]  Full code    Dispo- possible dc in 24 hours    * SOB, acute hypoxic resp failure sec to Influenza AH3   COPD exacerbation  elevated pro BNP , without CHF exacerbation   renally dosed Tamiflu - complete on 12/20  supplemental O2  nebs  on  solumedrol IV 40  BID - titrated down   - as per her son - they have visitors from out of town who has covid. patient is negative for COVID.  cardio and pulm consult appreciated   check for Home O2 requirements     GIOVANNA prerenal  2ndry to enalapril and trial of lasix - improved   elevated BNP without CHF exacerbation   s/p trial  low dose lasix 20 mg x2 ,   CXR unlikely CHF   bladder scan - pt voiding   hold enalapril, no further lasix       * Cardiomegaly  severe MS , AS  TTE- EF wnl  stable   no further  lasix per cardio  not candidate for invasive intervention for mitral stenosis     * Elevated troponin likely related Demand ischemia  - trending down      son  [phone]  Full code    Dispo- possible dc in 24 hours

## 2021-12-20 NOTE — PROGRESS NOTE ADULT - SUBJECTIVE AND OBJECTIVE BOX
Subjective:    Home Medications:  Advair Diskus 250 mcg-50 mcg inhalation powder: 1 puff(s) inhaled 2 times a day (16 Dec 2021 15:34)  Albuterol (Eqv-Proventil HFA) 90 mcg/inh inhalation aerosol: 2 puff(s) inhaled every 6 hours, As Needed (16 Dec 2021 15:34)  atorvastatin 40 mg oral tablet: 1 tab(s) orally once a day (at bedtime) (16 Dec 2021 15:34)  Calcium 600+D 600 mg-5 mcg (200 intl units) oral tablet: 1 tab(s) orally once a day (16 Dec 2021 15:34)  clopidogrel 75 mg oral tablet: 1 tab(s) orally once a day (16 Dec 2021 15:34)  enalapril 10 mg oral tablet: 1 tab(s) orally once a day (16 Dec 2021 15:34)  Fish Oil 1000 mg oral capsule: 2 cap(s) orally 2 times a day (16 Dec 2021 15:34)  Iferex 150 oral capsule: 1 cap(s) orally once a day (16 Dec 2021 15:34)  metoprolol tartrate 25 mg oral tablet: 2 tab(s) orally once a day (in the morning) (16 Dec 2021 15:34)  Metoprolol Tartrate 25 mg oral tablet: 1 tab(s) orally once a day (in the evening) (16 Dec 2021 15:34)  Multiple Vitamins oral tablet: 1 tab(s) orally once a day (16 Dec 2021 15:34)  potassium chloride 10 mEq oral tablet, extended release: 1 tab(s) orally once a day (at bedtime) (16 Dec 2021 15:34)  Silvadene 1% topical cream: Apply topically to affected area 2 times a day [Left leg] (16 Dec 2021 15:34)  travoprost 0.004% ophthalmic solution: 1 drop(s) to each affected eye once a day (in the evening) (16 Dec 2021 15:34)  Tylenol 8 HR Arthritis Pain 650 mg oral tablet, extended release: 1 tab(s) orally once a day (at bedtime) (16 Dec 2021 15:34)  Vitamin D3 25 mcg (1000 intl units) oral tablet: 1 tab(s) orally once a day (16 Dec 2021 15:34)  Voltaren 1% topical gel: Apply topically to affected area once a day [Bilateral knees] (16 Dec 2021 15:34)    MEDICATIONS  (STANDING):  atorvastatin 40 milliGRAM(s) Oral at bedtime  budesonide 160 MICROgram(s)/formoterol 4.5 MICROgram(s) Inhaler 2 Puff(s) Inhalation two times a day  cholecalciferol 1000 Unit(s) Oral daily  clopidogrel Tablet 75 milliGRAM(s) Oral daily  heparin   Injectable 5000 Unit(s) SubCutaneous every 12 hours  latanoprost 0.005% Ophthalmic Solution 1 Drop(s) Both EYES at bedtime  methylPREDNISolone sodium succinate Injectable 40 milliGRAM(s) IV Push every 12 hours  metoprolol tartrate 50 milliGRAM(s) Oral daily  metoprolol tartrate 25 milliGRAM(s) Oral at bedtime  multivitamin 1 Tablet(s) Oral daily  nystatin Powder 1 Application(s) Topical two times a day  oseltamivir 30 milliGRAM(s) Oral daily  pantoprazole    Tablet 40 milliGRAM(s) Oral before breakfast  potassium chloride    Tablet ER 10 milliEquivalent(s) Oral daily    MEDICATIONS  (PRN):  acetaminophen     Tablet .. 650 milliGRAM(s) Oral every 6 hours PRN Temp greater or equal to 38C (100.4F), Mild Pain (1 - 3)  ALBUTerol    90 MICROgram(s) HFA Inhaler 2 Puff(s) Inhalation every 6 hours PRN Shortness of Breath and/or Wheezing  aluminum hydroxide/magnesium hydroxide/simethicone Suspension 30 milliLiter(s) Oral every 4 hours PRN Dyspepsia  melatonin 3 milliGRAM(s) Oral at bedtime PRN Insomnia  ondansetron Injectable 4 milliGRAM(s) IV Push every 8 hours PRN Nausea and/or Vomiting      Allergies    No Known Allergies    Intolerances        Vital Signs Last 24 Hrs  T(C): 36.8 (20 Dec 2021 08:28), Max: 36.8 (20 Dec 2021 08:28)  T(F): 98.2 (20 Dec 2021 08:28), Max: 98.2 (20 Dec 2021 08:28)  HR: 67 (20 Dec 2021 08:28) (59 - 73)  BP: 157/58 (20 Dec 2021 08:28) (137/52 - 157/58)  BP(mean): --  RR: 18 (20 Dec 2021 08:28) (18 - 18)  SpO2: 100% (20 Dec 2021 08:28) (100% - 100%)      PHYSICAL EXAMINATION:    NECK:  Supple. No lymphadenopathy. Jugular venous pressure not elevated. Carotids equal.   HEART:   The cardiac impulse has a normal quality. Reg., Nl S1 and S2.  There are no murmurs, rubs or gallops noted  CHEST:  Chest is clear to auscultation. Normal respiratory effort.  ABDOMEN:  Soft and nontender.   EXTREMITIES:  There is no edema.       LABS:                        11.5   8.63  )-----------( 279      ( 19 Dec 2021 06:20 )             36.0     12-    142  |  107  |  55<H>  ----------------------------<  147<H>  4.9   |  31  |  1.20    Ca    9.4      20 Dec 2021 07:16        Urinalysis Basic - ( 20 Dec 2021 02:25 )    Color: Yellow / Appearance: Clear / S.015 / pH: x  Gluc: x / Ketone: Negative  / Bili: Negative / Urobili: Negative mg/dL   Blood: x / Protein: 15 mg/dL / Nitrite: Negative   Leuk Esterase: Trace / RBC: 0-2 /HPF / WBC 3-5   Sq Epi: x / Non Sq Epi: Few / Bacteria: Many             Subjective:    pat better, sitting in the bed, no respiratory distress.    Home Medications:  Advair Diskus 250 mcg-50 mcg inhalation powder: 1 puff(s) inhaled 2 times a day (16 Dec 2021 15:34)  Albuterol (Eqv-Proventil HFA) 90 mcg/inh inhalation aerosol: 2 puff(s) inhaled every 6 hours, As Needed (16 Dec 2021 15:34)  atorvastatin 40 mg oral tablet: 1 tab(s) orally once a day (at bedtime) (16 Dec 2021 15:34)  Calcium 600+D 600 mg-5 mcg (200 intl units) oral tablet: 1 tab(s) orally once a day (16 Dec 2021 15:34)  clopidogrel 75 mg oral tablet: 1 tab(s) orally once a day (16 Dec 2021 15:34)  enalapril 10 mg oral tablet: 1 tab(s) orally once a day (16 Dec 2021 15:34)  Fish Oil 1000 mg oral capsule: 2 cap(s) orally 2 times a day (16 Dec 2021 15:34)  Iferex 150 oral capsule: 1 cap(s) orally once a day (16 Dec 2021 15:34)  metoprolol tartrate 25 mg oral tablet: 2 tab(s) orally once a day (in the morning) (16 Dec 2021 15:34)  Metoprolol Tartrate 25 mg oral tablet: 1 tab(s) orally once a day (in the evening) (16 Dec 2021 15:34)  Multiple Vitamins oral tablet: 1 tab(s) orally once a day (16 Dec 2021 15:34)  potassium chloride 10 mEq oral tablet, extended release: 1 tab(s) orally once a day (at bedtime) (16 Dec 2021 15:34)  Silvadene 1% topical cream: Apply topically to affected area 2 times a day [Left leg] (16 Dec 2021 15:34)  travoprost 0.004% ophthalmic solution: 1 drop(s) to each affected eye once a day (in the evening) (16 Dec 2021 15:34)  Tylenol 8 HR Arthritis Pain 650 mg oral tablet, extended release: 1 tab(s) orally once a day (at bedtime) (16 Dec 2021 15:34)  Vitamin D3 25 mcg (1000 intl units) oral tablet: 1 tab(s) orally once a day (16 Dec 2021 15:34)  Voltaren 1% topical gel: Apply topically to affected area once a day [Bilateral knees] (16 Dec 2021 15:34)    MEDICATIONS  (STANDING):  atorvastatin 40 milliGRAM(s) Oral at bedtime  budesonide 160 MICROgram(s)/formoterol 4.5 MICROgram(s) Inhaler 2 Puff(s) Inhalation two times a day  cholecalciferol 1000 Unit(s) Oral daily  clopidogrel Tablet 75 milliGRAM(s) Oral daily  heparin   Injectable 5000 Unit(s) SubCutaneous every 12 hours  latanoprost 0.005% Ophthalmic Solution 1 Drop(s) Both EYES at bedtime  methylPREDNISolone sodium succinate Injectable 40 milliGRAM(s) IV Push every 12 hours  metoprolol tartrate 50 milliGRAM(s) Oral daily  metoprolol tartrate 25 milliGRAM(s) Oral at bedtime  multivitamin 1 Tablet(s) Oral daily  nystatin Powder 1 Application(s) Topical two times a day  oseltamivir 30 milliGRAM(s) Oral daily  pantoprazole    Tablet 40 milliGRAM(s) Oral before breakfast  potassium chloride    Tablet ER 10 milliEquivalent(s) Oral daily    MEDICATIONS  (PRN):  acetaminophen     Tablet .. 650 milliGRAM(s) Oral every 6 hours PRN Temp greater or equal to 38C (100.4F), Mild Pain (1 - 3)  ALBUTerol    90 MICROgram(s) HFA Inhaler 2 Puff(s) Inhalation every 6 hours PRN Shortness of Breath and/or Wheezing  aluminum hydroxide/magnesium hydroxide/simethicone Suspension 30 milliLiter(s) Oral every 4 hours PRN Dyspepsia  melatonin 3 milliGRAM(s) Oral at bedtime PRN Insomnia  ondansetron Injectable 4 milliGRAM(s) IV Push every 8 hours PRN Nausea and/or Vomiting      Allergies    No Known Allergies    Intolerances        Vital Signs Last 24 Hrs  T(C): 36.8 (20 Dec 2021 08:28), Max: 36.8 (20 Dec 2021 08:28)  T(F): 98.2 (20 Dec 2021 08:28), Max: 98.2 (20 Dec 2021 08:28)  HR: 67 (20 Dec 2021 08:28) (59 - 73)  BP: 157/58 (20 Dec 2021 08:28) (137/52 - 157/58)  BP(mean): --  RR: 18 (20 Dec 2021 08:28) (18 - 18)  SpO2: 100% (20 Dec 2021 08:28) (100% - 100%)      PHYSICAL EXAMINATION:    NECK:  Supple. No lymphadenopathy. Jugular venous pressure not elevated. Carotids equal.   HEART:   The cardiac impulse has a normal quality. Reg., Nl S1 and S2.  There are no murmurs, rubs or gallops noted  CHEST:  Chest crackles to auscultation. Normal respiratory effort.  ABDOMEN:  Soft and nontender.   EXTREMITIES:  There is no edema.       LABS:                        11.5   8.63  )-----------( 279      ( 19 Dec 2021 06:20 )             36.0     12-    142  |  107  |  55<H>  ----------------------------<  147<H>  4.9   |  31  |  1.20    Ca    9.4      20 Dec 2021 07:16        Urinalysis Basic - ( 20 Dec 2021 02:25 )    Color: Yellow / Appearance: Clear / S.015 / pH: x  Gluc: x / Ketone: Negative  / Bili: Negative / Urobili: Negative mg/dL   Blood: x / Protein: 15 mg/dL / Nitrite: Negative   Leuk Esterase: Trace / RBC: 0-2 /HPF / WBC 3-5   Sq Epi: x / Non Sq Epi: Few / Bacteria: Many

## 2021-12-20 NOTE — PROGRESS NOTE ADULT - SUBJECTIVE AND OBJECTIVE BOX
89 yo f hx of COPD presents s/p cough and shortness of breath; symptoms x 2 days. No known fevers or chills. Pt unable to provide hx 2/2 SOB. She is + for Influenza, on supplemental O2. Elevated troponin and BNP on admission.   pt denies hx of CHF. recieved IV lasix last night   Renal eval called for rising Cr      patient feels a littel better today wheezing less after inhalers     echo noted severe MS and mod AS      PAST MEDICAL & SURGICAL HISTORY:  COPD without exacerbation  CAD (coronary artery disease)    Home Medications:  Advair Diskus 250 mcg-50 mcg inhalation powder: 1 puff(s) inhaled 2 times a day (16 Dec 2021 15:34)  Albuterol (Eqv-Proventil HFA) 90 mcg/inh inhalation aerosol: 2 puff(s) inhaled every 6 hours, As Needed (16 Dec 2021 15:34)  atorvastatin 40 mg oral tablet: 1 tab(s) orally once a day (at bedtime) (16 Dec 2021 15:34)  Calcium 600+D 600 mg-5 mcg (200 intl units) oral tablet: 1 tab(s) orally once a day (16 Dec 2021 15:34)  clopidogrel 75 mg oral tablet: 1 tab(s) orally once a day (16 Dec 2021 15:34)  enalapril 10 mg oral tablet: 1 tab(s) orally once a day (16 Dec 2021 15:34)  Fish Oil 1000 mg oral capsule: 2 cap(s) orally 2 times a day (16 Dec 2021 15:34)  Iferex 150 oral capsule: 1 cap(s) orally once a day (16 Dec 2021 15:34)  metoprolol tartrate 25 mg oral tablet: 2 tab(s) orally once a day (in the morning) (16 Dec 2021 15:34)  Metoprolol Tartrate 25 mg oral tablet: 1 tab(s) orally once a day (in the evening) (16 Dec 2021 15:34)  Multiple Vitamins oral tablet: 1 tab(s) orally once a day (16 Dec 2021 15:34)  potassium chloride 10 mEq oral tablet, extended release: 1 tab(s) orally once a day (at bedtime) (16 Dec 2021 15:34)  Silvadene 1% topical cream: Apply topically to affected area 2 times a day [Left leg] (16 Dec 2021 15:34)  travoprost 0.004% ophthalmic solution: 1 drop(s) to each affected eye once a day (in the evening) (16 Dec 2021 15:34)  Tylenol 8 HR Arthritis Pain 650 mg oral tablet, extended release: 1 tab(s) orally once a day (at bedtime) (16 Dec 2021 15:34)  Vitamin D3 25 mcg (1000 intl units) oral tablet: 1 tab(s) orally once a day (16 Dec 2021 15:34)  Voltaren 1% topical gel: Apply topically to affected area once a day [Bilateral knees] (16 Dec 2021 15:34)      MEDICATIONS  (STANDING):  atorvastatin 40 milliGRAM(s) Oral at bedtime  budesonide 160 MICROgram(s)/formoterol 4.5 MICROgram(s) Inhaler 2 Puff(s) Inhalation two times a day  cholecalciferol 1000 Unit(s) Oral daily  clopidogrel Tablet 75 milliGRAM(s) Oral daily  heparin   Injectable 5000 Unit(s) SubCutaneous every 12 hours  latanoprost 0.005% Ophthalmic Solution 1 Drop(s) Both EYES at bedtime  methylPREDNISolone sodium succinate Injectable 40 milliGRAM(s) IV Push daily  metoprolol tartrate 50 milliGRAM(s) Oral daily  metoprolol tartrate 25 milliGRAM(s) Oral at bedtime  multivitamin 1 Tablet(s) Oral daily  nystatin Powder 1 Application(s) Topical two times a day  oseltamivir 30 milliGRAM(s) Oral two times a day  pantoprazole    Tablet 40 milliGRAM(s) Oral before breakfast  polyethylene glycol 3350 17 Gram(s) Oral daily  potassium chloride    Tablet ER 10 milliEquivalent(s) Oral daily        Allergies    No Known Allergies    Intolerances        SOCIAL HISTORY:  Denies ETOh,Smoking,     FAMILY HISTORY:      REVIEW OF SYSTEMS:    CONSTITUTIONAL: No weakness, fevers or chills  EYES/ENT: No visual changes;  No vertigo or throat pain   NECK: No pain or stiffness  RESPIRATORY: No cough, wheezing, hemoptysis;  stable  shortness of breath  CARDIOVASCULAR: No chest pain or palpitations  GASTROINTESTINAL: No abdominal or epigastric pain. No nausea, vomiting, or hematemesis; No diarrhea or constipation. No melena or hematochezia.  GENITOURINARY: No dysuria, frequency or hematuria  NEUROLOGICAL: No numbness or weakness  SKIN: No itching, burning, rashes, or lesions   All other review of systems is negative unless indicated above.    Vital Signs Last 24 Hrs  T(C): 36.6 (20 Dec 2021 15:41), Max: 36.8 (20 Dec 2021 08:28)  T(F): 97.9 (20 Dec 2021 15:41), Max: 98.2 (20 Dec 2021 08:28)  HR: 69 (20 Dec 2021 21:25) (67 - 104)  BP: 133/54 (20 Dec 2021 21:25) (133/54 - 175/58)  BP(mean): 74 (20 Dec 2021 21:25) (74 - 74)  RR: 18 (20 Dec 2021 21:25) (16 - 18)  SpO2: 94% (20 Dec 2021 21:25) (92% - 100%)        PHYSICAL EXAM:    Constitutional: frail  HEENT:  EOMI,  MMM  Neck: No LAD, No JVD  Respiratory: poor ae bilat w wheezing   Cardiovascular: S1 and S2  Gastrointestinal: BS+, soft, NT/ND  Extremities: No peripheral edema  Neurological: A/O x 3, no focal deficits  : No Sosa  Skin: No rashes  Access: Not applicable    LABS:                 142    |  107    |  55     ----------------------------<  147       20 Dec 2021 07:16  4.9     |  31     |  1.20     139    |  107    |  58     ----------------------------<  155       19 Dec 2021 06:20  4.6     |  29     |  1.39     140    |  107    |  55     ----------------------------<  193       18 Dec 2021 11:24  4.9     |  28     |  1.51     Ca    9.4        20 Dec 2021 07:16  Ca    9.5        19 Dec 2021 06:20            Ca    9.5        19 Dec 2021 06:20  Ca    9.1        18 Dec 2021 11:24                     11.5   8.63  )-----------( 279      ( 19 Dec 2021 06:20 )             36.0           Urine Studies:          RADIOLOGY & ADDITIONAL STUDIES:

## 2021-12-20 NOTE — PROGRESS NOTE ADULT - SUBJECTIVE AND OBJECTIVE BOX
89 yo f hx of COPD presents s/p cough and shortness of breath; symptoms x 2 days. No known fevers or chills. Pt unable to provide hx 2/2 SOB. She is + for Influenza, on supplemental O2. Elevated troponin and BNP on admission.       12/19 wheezing improved, Cr increased , patient feels a little better today  12/20- as per patient report, breathing is improved. no wheezing, Stated that she has not walked today.    ROS:   All 10 systems reviewed and found to be negative with the exception of what has been described above.     PE:  Constitutional: NAD, laying in bed  HEENT: NC/AT  Back: no tenderness  Respiratory: respirations even and non labored, +expiratory wheezing but improving air entry   on supplemental O2 - improved   Cardiovascular: S1S2 regular, no murmurs  Abdomen: soft, not tender, not distended, positive BS  Genitourinary: voiding  Musculoskeletal: no muscle tenderness, no joint swelling or tenderness  Extremities: no pedal edema   Neurological: no focal deficits      12-20    142  |  107  |  55<H>  ----------------------------<  147<H>  4.9   |  31  |  1.20    Ca    9.4      20 Dec 2021 07:16                              11.5   8.63  )-----------( 279      ( 19 Dec 2021 06:20 )             36.0       MEDICATIONS  (STANDING):  atorvastatin 40 milliGRAM(s) Oral at bedtime  budesonide 160 MICROgram(s)/formoterol 4.5 MICROgram(s) Inhaler 2 Puff(s) Inhalation two times a day  cholecalciferol 1000 Unit(s) Oral daily  clopidogrel Tablet 75 milliGRAM(s) Oral daily  heparin   Injectable 5000 Unit(s) SubCutaneous every 12 hours  latanoprost 0.005% Ophthalmic Solution 1 Drop(s) Both EYES at bedtime  methylPREDNISolone sodium succinate Injectable 40 milliGRAM(s) IV Push daily  metoprolol tartrate 50 milliGRAM(s) Oral daily  metoprolol tartrate 25 milliGRAM(s) Oral at bedtime  multivitamin 1 Tablet(s) Oral daily  nystatin Powder 1 Application(s) Topical two times a day  oseltamivir 30 milliGRAM(s) Oral daily  pantoprazole    Tablet 40 milliGRAM(s) Oral before breakfast  potassium chloride    Tablet ER 10 milliEquivalent(s) Oral daily    MEDICATIONS  (PRN):  acetaminophen     Tablet .. 650 milliGRAM(s) Oral every 6 hours PRN Temp greater or equal to 38C (100.4F), Mild Pain (1 - 3)  ALBUTerol    90 MICROgram(s) HFA Inhaler 2 Puff(s) Inhalation every 6 hours PRN Shortness of Breath and/or Wheezing  aluminum hydroxide/magnesium hydroxide/simethicone Suspension 30 milliLiter(s) Oral every 4 hours PRN Dyspepsia  melatonin 3 milliGRAM(s) Oral at bedtime PRN Insomnia  ondansetron Injectable 4 milliGRAM(s) IV Push every 8 hours PRN Nausea and/or Vomiting

## 2021-12-20 NOTE — PROGRESS NOTE ADULT - NUTRITIONAL ASSESSMENT
This patient has been assessed with a concern for Malnutrition and has been determined to have a diagnosis/diagnoses of Severe protein-calorie malnutrition.    This patient is being managed with:   Diet DASH/TLC-  Sodium & Cholesterol Restricted  1000mL Fluid Restriction ([id number])  Entered: Dec 18 2021  1:48PM    
This patient has been assessed with a concern for Malnutrition and has been determined to have a diagnosis/diagnoses of Severe protein-calorie malnutrition.    This patient is being managed with:   Diet DASH/TLC-  Sodium & Cholesterol Restricted  1000mL Fluid Restriction ([id number])  Entered: Dec 18 2021  1:48PM    
This patient has been assessed with a concern for Malnutrition and has been determined to have a diagnosis/diagnoses of Severe protein-calorie malnutrition.    This patient is being managed with:   Diet Soft and Bite Sized-  Entered: Dec 17 2021 10:36AM    
This patient has been assessed with a concern for Malnutrition and has been determined to have a diagnosis/diagnoses of Severe protein-calorie malnutrition.    This patient is being managed with:   Diet DASH/TLC-  Sodium & Cholesterol Restricted  1000mL Fluid Restriction ([id number])  Entered: Dec 18 2021  1:48PM    
This patient has been assessed with a concern for Malnutrition and has been determined to have a diagnosis/diagnoses of Severe protein-calorie malnutrition.    This patient is being managed with:   Diet Pureed-  Entered: Dec 15 2021  3:28PM

## 2021-12-21 ENCOUNTER — TRANSCRIPTION ENCOUNTER (OUTPATIENT)
Age: 86
End: 2021-12-21

## 2021-12-21 VITALS
TEMPERATURE: 98 F | RESPIRATION RATE: 18 BRPM | SYSTOLIC BLOOD PRESSURE: 145 MMHG | OXYGEN SATURATION: 95 % | HEART RATE: 65 BPM | DIASTOLIC BLOOD PRESSURE: 58 MMHG

## 2021-12-21 LAB
ANION GAP SERPL CALC-SCNC: 2 MMOL/L — LOW (ref 5–17)
BUN SERPL-MCNC: 50 MG/DL — HIGH (ref 7–23)
CALCIUM SERPL-MCNC: 9.5 MG/DL — SIGNIFICANT CHANGE UP (ref 8.5–10.1)
CHLORIDE SERPL-SCNC: 108 MMOL/L — SIGNIFICANT CHANGE UP (ref 96–108)
CO2 SERPL-SCNC: 30 MMOL/L — SIGNIFICANT CHANGE UP (ref 22–31)
CREAT SERPL-MCNC: 1.2 MG/DL — SIGNIFICANT CHANGE UP (ref 0.5–1.3)
GLUCOSE SERPL-MCNC: 100 MG/DL — HIGH (ref 70–99)
POTASSIUM SERPL-MCNC: 4.1 MMOL/L — SIGNIFICANT CHANGE UP (ref 3.5–5.3)
POTASSIUM SERPL-SCNC: 4.1 MMOL/L — SIGNIFICANT CHANGE UP (ref 3.5–5.3)
SODIUM SERPL-SCNC: 140 MMOL/L — SIGNIFICANT CHANGE UP (ref 135–145)

## 2021-12-21 PROCEDURE — 99239 HOSP IP/OBS DSCHRG MGMT >30: CPT

## 2021-12-21 RX ORDER — POTASSIUM CHLORIDE 20 MEQ
1 PACKET (EA) ORAL
Qty: 0 | Refills: 0 | DISCHARGE

## 2021-12-21 RX ORDER — POLYETHYLENE GLYCOL 3350 17 G/17G
17 POWDER, FOR SOLUTION ORAL
Qty: 0 | Refills: 0 | DISCHARGE
Start: 2021-12-21

## 2021-12-21 RX ADMIN — Medication 50 MILLIGRAM(S): at 10:32

## 2021-12-21 RX ADMIN — POLYETHYLENE GLYCOL 3350 17 GRAM(S): 17 POWDER, FOR SOLUTION ORAL at 10:32

## 2021-12-21 RX ADMIN — NYSTATIN CREAM 1 APPLICATION(S): 100000 CREAM TOPICAL at 10:35

## 2021-12-21 RX ADMIN — Medication 20 MILLIGRAM(S): at 10:35

## 2021-12-21 RX ADMIN — Medication 1000 UNIT(S): at 10:33

## 2021-12-21 RX ADMIN — PANTOPRAZOLE SODIUM 40 MILLIGRAM(S): 20 TABLET, DELAYED RELEASE ORAL at 06:02

## 2021-12-21 RX ADMIN — HEPARIN SODIUM 5000 UNIT(S): 5000 INJECTION INTRAVENOUS; SUBCUTANEOUS at 10:32

## 2021-12-21 RX ADMIN — CLOPIDOGREL BISULFATE 75 MILLIGRAM(S): 75 TABLET, FILM COATED ORAL at 10:32

## 2021-12-21 RX ADMIN — Medication 1 TABLET(S): at 10:32

## 2021-12-21 RX ADMIN — BUDESONIDE AND FORMOTEROL FUMARATE DIHYDRATE 2 PUFF(S): 160; 4.5 AEROSOL RESPIRATORY (INHALATION) at 08:12

## 2021-12-21 NOTE — DISCHARGE NOTE PROVIDER - DETAILS OF MALNUTRITION DIAGNOSIS/DIAGNOSES
This patient has been assessed with a concern for Malnutrition and was treated during this hospitalization for the following Nutrition diagnosis/diagnoses:     -  12/16/2021: Severe protein-calorie malnutrition

## 2021-12-21 NOTE — DISCHARGE NOTE PROVIDER - CARE PROVIDER_API CALL
Luke Kaplan (MD)  Cardiovascular Disease  [address], [address]  [place] [address]  Phone: [phone]  Fax: [phone]  Follow Up Time:     Valdemar Snell  [facility]  [address], [address]  [place] [address]  Phone: [phone]  Fax: [phone]  Follow Up Time:

## 2021-12-21 NOTE — DISCHARGE NOTE PROVIDER - NSDCMRMEDTOKEN_GEN_ALL_CORE_FT
Advair Diskus 250 mcg-50 mcg inhalation powder: 1 puff(s) inhaled 2 times a day  Albuterol (Eqv-Proventil HFA) 90 mcg/inh inhalation aerosol: 2 puff(s) inhaled every 6 hours, As Needed  atorvastatin 40 mg oral tablet: 1 tab(s) orally once a day (at bedtime)  Calcium 600+D 600 mg-5 mcg (200 intl units) oral tablet: 1 tab(s) orally once a day  clopidogrel 75 mg oral tablet: 1 tab(s) orally once a day  enalapril 10 mg oral tablet: 1 tab(s) orally once a day  Fish Oil 1000 mg oral capsule: 2 cap(s) orally 2 times a day  Iferex 150 oral capsule: 1 cap(s) orally once a day  metoprolol tartrate 25 mg oral tablet: 2 tab(s) orally once a day (in the morning)  Metoprolol Tartrate 25 mg oral tablet: 1 tab(s) orally once a day (in the evening)  Multiple Vitamins oral tablet: 1 tab(s) orally once a day  polyethylene glycol 3350 oral powder for reconstitution: 17 gram(s) orally once a day  predniSONE 20 mg oral tablet: 1 tab(s) orally once a day  Silvadene 1% topical cream: Apply topically to affected area 2 times a day [Left leg]  travoprost 0.004% ophthalmic solution: 1 drop(s) to each affected eye once a day (in the evening)  Tylenol 8 HR Arthritis Pain 650 mg oral tablet, extended release: 1 tab(s) orally once a day (at bedtime)  Vitamin D3 25 mcg (1000 intl units) oral tablet: 1 tab(s) orally once a day  Voltaren 1% topical gel: Apply topically to affected area once a day [Bilateral knees]

## 2021-12-21 NOTE — PROGRESS NOTE ADULT - ASSESSMENT
PROBLEMS:    SOB, acute hypoxic resp failure sec to Influenza AH3   AC COPD exacerbation  GIOVANNA prerenal from CHF vs 2ndry to enalapril  elevated BNP  Cardiomegaly  Elevated troponin likely related Demand ischemia      PLAN:    pulmonary better- decd planning  Tamiflu  supplemental O2  nebs  monitor sats  IV solumedrol 40mg qdaily   elevated BNP  Trend trops-trending down   DVT prophylasix

## 2021-12-21 NOTE — PROGRESS NOTE ADULT - REASON FOR ADMISSION
flu
flu/ shortness of breath
shortness of breath/ positive flu
shortness of breath/ positive flu
sob

## 2021-12-21 NOTE — PROGRESS NOTE ADULT - SUBJECTIVE AND OBJECTIVE BOX
Subjective:    pat better, lying in the bed, no new complaint.    Home Medications:  Advair Diskus 250 mcg-50 mcg inhalation powder: 1 puff(s) inhaled 2 times a day (16 Dec 2021 15:34)  Albuterol (Eqv-Proventil HFA) 90 mcg/inh inhalation aerosol: 2 puff(s) inhaled every 6 hours, As Needed (16 Dec 2021 15:34)  atorvastatin 40 mg oral tablet: 1 tab(s) orally once a day (at bedtime) (16 Dec 2021 15:34)  Calcium 600+D 600 mg-5 mcg (200 intl units) oral tablet: 1 tab(s) orally once a day (16 Dec 2021 15:34)  clopidogrel 75 mg oral tablet: 1 tab(s) orally once a day (16 Dec 2021 15:34)  Fish Oil 1000 mg oral capsule: 2 cap(s) orally 2 times a day (16 Dec 2021 15:34)  Iferex 150 oral capsule: 1 cap(s) orally once a day (16 Dec 2021 15:34)  metoprolol tartrate 25 mg oral tablet: 2 tab(s) orally once a day (in the morning) (16 Dec 2021 15:34)  Metoprolol Tartrate 25 mg oral tablet: 1 tab(s) orally once a day (in the evening) (16 Dec 2021 15:34)  Multiple Vitamins oral tablet: 1 tab(s) orally once a day (16 Dec 2021 15:34)  polyethylene glycol 3350 oral powder for reconstitution: 17 gram(s) orally once a day (21 Dec 2021 10:26)  Silvadene 1% topical cream: Apply topically to affected area 2 times a day [Left leg] (16 Dec 2021 15:34)  travoprost 0.004% ophthalmic solution: 1 drop(s) to each affected eye once a day (in the evening) (16 Dec 2021 15:34)  Tylenol 8 HR Arthritis Pain 650 mg oral tablet, extended release: 1 tab(s) orally once a day (at bedtime) (16 Dec 2021 15:34)  Vitamin D3 25 mcg (1000 intl units) oral tablet: 1 tab(s) orally once a day (16 Dec 2021 15:34)  Voltaren 1% topical gel: Apply topically to affected area once a day [Bilateral knees] (16 Dec 2021 15:34)    MEDICATIONS  (STANDING):  atorvastatin 40 milliGRAM(s) Oral at bedtime  budesonide 160 MICROgram(s)/formoterol 4.5 MICROgram(s) Inhaler 2 Puff(s) Inhalation two times a day  cholecalciferol 1000 Unit(s) Oral daily  clopidogrel Tablet 75 milliGRAM(s) Oral daily  heparin   Injectable 5000 Unit(s) SubCutaneous every 12 hours  latanoprost 0.005% Ophthalmic Solution 1 Drop(s) Both EYES at bedtime  metoprolol tartrate 50 milliGRAM(s) Oral daily  metoprolol tartrate 25 milliGRAM(s) Oral at bedtime  multivitamin 1 Tablet(s) Oral daily  nystatin Powder 1 Application(s) Topical two times a day  pantoprazole    Tablet 40 milliGRAM(s) Oral before breakfast  polyethylene glycol 3350 17 Gram(s) Oral daily  potassium chloride    Tablet ER 10 milliEquivalent(s) Oral daily  predniSONE   Tablet 20 milliGRAM(s) Oral daily    MEDICATIONS  (PRN):  acetaminophen     Tablet .. 650 milliGRAM(s) Oral every 6 hours PRN Temp greater or equal to 38C (100.4F), Mild Pain (1 - 3)  ALBUTerol    90 MICROgram(s) HFA Inhaler 2 Puff(s) Inhalation every 6 hours PRN Shortness of Breath and/or Wheezing  aluminum hydroxide/magnesium hydroxide/simethicone Suspension 30 milliLiter(s) Oral every 4 hours PRN Dyspepsia  bisacodyl Suppository 10 milliGRAM(s) Rectal daily PRN Constipation  melatonin 3 milliGRAM(s) Oral at bedtime PRN Insomnia  ondansetron Injectable 4 milliGRAM(s) IV Push every 8 hours PRN Nausea and/or Vomiting      Allergies    No Known Allergies    Intolerances        Vital Signs Last 24 Hrs  T(C): 36.7 (21 Dec 2021 09:25), Max: 36.7 (21 Dec 2021 09:25)  T(F): 98.1 (21 Dec 2021 09:25), Max: 98.1 (21 Dec 2021 09:25)  HR: 65 (21 Dec 2021 09:25) (65 - 71)  BP: 145/58 (21 Dec 2021 09:25) (133/54 - 145/58)  BP(mean): 74 (20 Dec 2021 21:25) (74 - 74)  RR: 18 (21 Dec 2021 09:25) (18 - 18)  SpO2: 95% (21 Dec 2021 09:25) (94% - 95%)      PHYSICAL EXAMINATION:    NECK:  Supple. No lymphadenopathy. Jugular venous pressure not elevated. Carotids equal.   HEART:   The cardiac impulse has a normal quality. Reg., Nl S1 and S2.  There are no murmurs, rubs or gallops noted  CHEST:  Chest crackles to auscultation. Normal respiratory effort.  ABDOMEN:  Soft and nontender.   EXTREMITIES:  There is no edema.       LABS:        140  |  108  |  50<H>  ----------------------------<  100<H>  4.1   |  30  |  1.20    Ca    9.5      21 Dec 2021 06:46        Urinalysis Basic - ( 20 Dec 2021 02:25 )    Color: Yellow / Appearance: Clear / S.015 / pH: x  Gluc: x / Ketone: Negative  / Bili: Negative / Urobili: Negative mg/dL   Blood: x / Protein: 15 mg/dL / Nitrite: Negative   Leuk Esterase: Trace / RBC: 0-2 /HPF / WBC 3-5   Sq Epi: x / Non Sq Epi: Few / Bacteria: Many

## 2021-12-21 NOTE — DISCHARGE NOTE PROVIDER - HOSPITAL COURSE
89 yo f hx of COPD presents s/p cough and shortness of breath; symptoms x 2 days. No known fevers or chills. Pt unable to provide hx 2/2 SOB. She is + for Influenza, on supplemental O2. Elevated troponin and BNP on admission. Was placed on supplemental O2, patient was started on solumedrol for wheezing. Hospital course complicated with GIOVANNA secondary to ACE and lasix- which has resolved.     12/21-patient was seen and examined. No acute overnight events, no new complaints. plan for dc home today.    Vital Signs Last 24 Hrs  T(C): 36.6 (20 Dec 2021 15:41), Max: 36.6 (20 Dec 2021 15:41)  T(F): 97.9 (20 Dec 2021 15:41), Max: 97.9 (20 Dec 2021 15:41)  HR: 71 (21 Dec 2021 08:14) (69 - 73)  BP: 133/54 (20 Dec 2021 21:25) (133/54 - 175/58)  BP(mean): 74 (20 Dec 2021 21:25) (74 - 74)  RR: 18 (20 Dec 2021 21:25) (16 - 18)  SpO2: 94% (20 Dec 2021 21:25) (93% - 95%)      ROS:   All 10 systems reviewed and found to be negative with the exception of what has been described above.       PE:  Constitutional: NAD, laying in bed  HEENT: NC/AT  Back: no tenderness  Respiratory: respirations even and non labored, LCTA  Cardiovascular: S1S2 regular, no murmurs  Abdomen: soft, not tender, not distended, positive BS  Genitourinary: voiding  Musculoskeletal: no muscle tenderness, no joint swelling or tenderness  Extremities: no pedal edema   Neurological: no focal deficits    PLAN   * SOB, acute hypoxic resp failure sec to Influenza AH3 - resolved   COPD exacerbation  elevated pro BNP , without CHF exacerbation   renally dosed Tamiflu - complete on 12/20  supplemental O2- discontinued   nebs  on  solumedrol IV 40  BID - titrated down - on po prednisone x3 days   - as per her son - they have visitors from out of town who has covid. patient is negative for COVID.  cardio and pulm consult appreciated   check for Home O2 requirements - does not need home O2     GIOVANNA prerenal  2ndry to enalapril and trial of lasix - improved   elevated BNP without CHF exacerbation   s/p trial  low dose lasix 20 mg x2 ,   CXR unlikely CHF   bladder scan - pt voiding   hold enalapril, no further lasix       * Cardiomegaly  severe MS , AS  TTE- EF wnl  stable   no further  lasix per cardio  not candidate for invasive intervention for mitral stenosis     * Elevated troponin likely related Demand ischemia  - trending down      son  [phone]  Full code    Dispo- plan for dc home today   89 yo f hx of COPD presents s/p cough and shortness of breath; symptoms x 2 days. No known fevers or chills. Pt unable to provide hx 2/2 SOB. She is + for Influenza, on supplemental O2. Elevated troponin and BNP on admission. Was placed on supplemental O2, patient was started on solumedrol for wheezing. Hospital course complicated with GIOVANNA secondary to ACE and lasix- which has resolved.     12/21-patient was seen and examined. No acute overnight events, no new complaints. plan for dc home today.    Vital Signs Last 24 Hrs  T(C): 36.6 (20 Dec 2021 15:41), Max: 36.6 (20 Dec 2021 15:41)  T(F): 97.9 (20 Dec 2021 15:41), Max: 97.9 (20 Dec 2021 15:41)  HR: 71 (21 Dec 2021 08:14) (69 - 73)  BP: 133/54 (20 Dec 2021 21:25) (133/54 - 175/58)  BP(mean): 74 (20 Dec 2021 21:25) (74 - 74)  RR: 18 (20 Dec 2021 21:25) (16 - 18)  SpO2: 94% (20 Dec 2021 21:25) (93% - 95%)      ROS:   All 10 systems reviewed and found to be negative with the exception of what has been described above.       PE:  Constitutional: NAD, laying in bed  HEENT: NC/AT  Back: no tenderness  Respiratory: respirations even and non labored, LCTA  Cardiovascular: S1S2 regular, no murmurs  Abdomen: soft, not tender, not distended, positive BS  Genitourinary: voiding  Musculoskeletal: no muscle tenderness, no joint swelling or tenderness  Extremities: no pedal edema   Neurological: no focal deficits    PLAN   * SOB, acute hypoxic resp failure sec to Influenza AH3 - resolved   COPD exacerbation  elevated pro BNP , without CHF exacerbation   renally dosed Tamiflu - complete on 12/20  supplemental O2- discontinued   nebs  on  solumedrol IV 40  BID - titrated down - on po prednisone x3 days   - as per her son - they have visitors from out of town who has covid. patient is negative for COVID.  cardio and pulm consult appreciated   check for Home O2 requirements - does not need home O2     GIOVANNA prerenal  2ndry to enalapril and trial of lasix - improved   elevated BNP without CHF exacerbation   s/p trial  low dose lasix 20 mg x2 ,   CXR unlikely CHF   bladder scan - pt voiding   hold enalapril, no further lasix       * Cardiomegaly  severe MS , AS  TTE- EF wnl  stable   no further  lasix per cardio  not candidate for invasive intervention for mitral stenosis     * Elevated troponin likely related Demand ischemia  - trending down     *right shin ulcer - POA  - silvadene cream applied      son  [phone]  Full code    Dispo- plan for dc home today

## 2021-12-21 NOTE — DISCHARGE NOTE NURSING/CASE MANAGEMENT/SOCIAL WORK - PATIENT PORTAL LINK FT
You can access the FollowMyHealth Patient Portal offered by [facility] by registering at the following website: [email or web address]. By joining [facility]’s FollowMyHealth portal, you will also be able to view your health information using other applications (apps) compatible with our system.

## 2021-12-21 NOTE — DISCHARGE NOTE NURSING/CASE MANAGEMENT/SOCIAL WORK - NSDCPEFALRISK_GEN_ALL_CORE
For information on Fall & Injury Prevention, visit: [email or web address] OR  [email or web address] OR  https://www.cdc.gov/steadi/patient.html

## 2021-12-21 NOTE — DISCHARGE NOTE PROVIDER - NSDCCPCAREPLAN_GEN_ALL_CORE_FT
PRINCIPAL DISCHARGE DIAGNOSIS  Diagnosis: Influenza A virus present  Assessment and Plan of Treatment: completed tamiflu  HTN- continue enlapril- repeat renal panel with your PCP  _*cardiomegaly- severe mitral stenosis and Aortic stenosis  -f/u with cardiologist as an outpatient         PRINCIPAL DISCHARGE DIAGNOSIS  Diagnosis: Influenza A virus present  Assessment and Plan of Treatment: completed tamiflu  HTN- continue enlapril- repeat renal panel with your PCP  _*cardiomegaly- severe mitral stenosis and Aortic stenosis  -f/u with cardiologist as an outpatient  right shin ulcer-  contienu with silvadene cream  *constipation  -high fiber diet  - stool softener OTC.   - increase po fluif intake

## 2021-12-21 NOTE — DISCHARGE NOTE PROVIDER - ATTENDING DISCHARGE PHYSICAL EXAMINATION:
Patient seen and examined with KELLY Young.  I was physically present for the key portions of the evaluation and management (E/M) service provided.  I agree with the above history, physical, and plan which I have reviewed and edited where appropriate.   resp cta b/l    - hold acei upon dc as per renal.  KELLY young will speak with dr kilpatrick so it can be resumed as outpt once cr back at  baseline and stable.  - outpt f/u with dr hart

## 2021-12-22 ENCOUNTER — EMERGENCY (EMERGENCY)
Facility: HOSPITAL | Age: 86
LOS: 0 days | Discharge: HOME | End: 2021-12-22
Attending: EMERGENCY MEDICINE
Payer: MEDICARE

## 2021-12-22 VITALS
OXYGEN SATURATION: 98 % | TEMPERATURE: 98 F | HEIGHT: 64 IN | HEART RATE: 92 BPM | WEIGHT: 175.05 LBS | RESPIRATION RATE: 22 BRPM | DIASTOLIC BLOOD PRESSURE: 69 MMHG | SYSTOLIC BLOOD PRESSURE: 209 MMHG

## 2021-12-22 VITALS
HEART RATE: 89 BPM | RESPIRATION RATE: 20 BRPM | OXYGEN SATURATION: 96 % | DIASTOLIC BLOOD PRESSURE: 70 MMHG | TEMPERATURE: 98 F | SYSTOLIC BLOOD PRESSURE: 158 MMHG

## 2021-12-22 DIAGNOSIS — Z87.891 PERSONAL HISTORY OF NICOTINE DEPENDENCE: ICD-10-CM

## 2021-12-22 DIAGNOSIS — R06.02 SHORTNESS OF BREATH: ICD-10-CM

## 2021-12-22 DIAGNOSIS — R05.9 COUGH, UNSPECIFIED: ICD-10-CM

## 2021-12-22 DIAGNOSIS — Z20.822 CONTACT WITH AND (SUSPECTED) EXPOSURE TO COVID-19: ICD-10-CM

## 2021-12-22 DIAGNOSIS — J44.9 CHRONIC OBSTRUCTIVE PULMONARY DISEASE, UNSPECIFIED: ICD-10-CM

## 2021-12-22 DIAGNOSIS — Z79.02 LONG TERM (CURRENT) USE OF ANTITHROMBOTICS/ANTIPLATELETS: ICD-10-CM

## 2021-12-22 PROBLEM — I25.10 ATHEROSCLEROTIC HEART DISEASE OF NATIVE CORONARY ARTERY WITHOUT ANGINA PECTORIS: Chronic | Status: ACTIVE | Noted: 2021-12-15

## 2021-12-22 LAB
ALBUMIN SERPL ELPH-MCNC: 3 G/DL — LOW (ref 3.3–5)
ALP SERPL-CCNC: 89 U/L — SIGNIFICANT CHANGE UP (ref 40–120)
ALT FLD-CCNC: 35 U/L — SIGNIFICANT CHANGE UP (ref 12–78)
ANION GAP SERPL CALC-SCNC: 5 MMOL/L — SIGNIFICANT CHANGE UP (ref 5–17)
APTT BLD: 26.5 SEC — LOW (ref 27.5–35.5)
AST SERPL-CCNC: 25 U/L — SIGNIFICANT CHANGE UP (ref 15–37)
BASE EXCESS BLDV CALC-SCNC: 5.5 MMOL/L — SIGNIFICANT CHANGE UP
BASOPHILS # BLD AUTO: 0 K/UL — SIGNIFICANT CHANGE UP (ref 0–0.2)
BASOPHILS NFR BLD AUTO: 0 % — SIGNIFICANT CHANGE UP (ref 0–2)
BILIRUB SERPL-MCNC: 0.6 MG/DL — SIGNIFICANT CHANGE UP (ref 0.2–1.2)
BUN SERPL-MCNC: 41 MG/DL — HIGH (ref 7–23)
CALCIUM SERPL-MCNC: 9.4 MG/DL — SIGNIFICANT CHANGE UP (ref 8.5–10.1)
CHLORIDE SERPL-SCNC: 108 MMOL/L — SIGNIFICANT CHANGE UP (ref 96–108)
CO2 BLDV-SCNC: 33 MMOL/L — HIGH (ref 22–26)
CO2 SERPL-SCNC: 29 MMOL/L — SIGNIFICANT CHANGE UP (ref 22–31)
CREAT SERPL-MCNC: 1.17 MG/DL — SIGNIFICANT CHANGE UP (ref 0.5–1.3)
EOSINOPHIL # BLD AUTO: 0 K/UL — SIGNIFICANT CHANGE UP (ref 0–0.5)
EOSINOPHIL NFR BLD AUTO: 0 % — SIGNIFICANT CHANGE UP (ref 0–6)
GLUCOSE SERPL-MCNC: 102 MG/DL — HIGH (ref 70–99)
HCO3 BLDV-SCNC: 31 MMOL/L — HIGH (ref 22–29)
HCT VFR BLD CALC: 37.6 % — SIGNIFICANT CHANGE UP (ref 34.5–45)
HGB BLD-MCNC: 12.3 G/DL — SIGNIFICANT CHANGE UP (ref 11.5–15.5)
INR BLD: 1.08 RATIO — SIGNIFICANT CHANGE UP (ref 0.88–1.16)
LYMPHOCYTES # BLD AUTO: 3.9 K/UL — HIGH (ref 1–3.3)
LYMPHOCYTES # BLD AUTO: 37 % — SIGNIFICANT CHANGE UP (ref 13–44)
MAGNESIUM SERPL-MCNC: 2.6 MG/DL — SIGNIFICANT CHANGE UP (ref 1.6–2.6)
MCHC RBC-ENTMCNC: 29.6 PG — SIGNIFICANT CHANGE UP (ref 27–34)
MCHC RBC-ENTMCNC: 32.7 GM/DL — SIGNIFICANT CHANGE UP (ref 32–36)
MCV RBC AUTO: 90.4 FL — SIGNIFICANT CHANGE UP (ref 80–100)
MONOCYTES # BLD AUTO: 1.05 K/UL — HIGH (ref 0–0.9)
MONOCYTES NFR BLD AUTO: 10 % — SIGNIFICANT CHANGE UP (ref 2–14)
NEUTROPHILS # BLD AUTO: 5.27 K/UL — SIGNIFICANT CHANGE UP (ref 1.8–7.4)
NEUTROPHILS NFR BLD AUTO: 50 % — SIGNIFICANT CHANGE UP (ref 43–77)
NRBC # BLD: SIGNIFICANT CHANGE UP /100 WBCS (ref 0–0)
NT-PROBNP SERPL-SCNC: 4336 PG/ML — HIGH (ref 0–450)
PCO2 BLDV: 48 MMHG — HIGH (ref 39–42)
PH BLDV: 7.42 — SIGNIFICANT CHANGE UP (ref 7.32–7.43)
PLATELET # BLD AUTO: 334 K/UL — SIGNIFICANT CHANGE UP (ref 150–400)
PO2 BLDV: 53 MMHG — SIGNIFICANT CHANGE UP
POTASSIUM SERPL-MCNC: 4.1 MMOL/L — SIGNIFICANT CHANGE UP (ref 3.5–5.3)
POTASSIUM SERPL-SCNC: 4.1 MMOL/L — SIGNIFICANT CHANGE UP (ref 3.5–5.3)
PROT SERPL-MCNC: 6.8 GM/DL — SIGNIFICANT CHANGE UP (ref 6–8.3)
PROTHROM AB SERPL-ACNC: 12.5 SEC — SIGNIFICANT CHANGE UP (ref 10.6–13.6)
RBC # BLD: 4.16 M/UL — SIGNIFICANT CHANGE UP (ref 3.8–5.2)
RBC # FLD: 13.4 % — SIGNIFICANT CHANGE UP (ref 10.3–14.5)
SAO2 % BLDV: 86.9 % — SIGNIFICANT CHANGE UP
SARS-COV-2 RNA SPEC QL NAA+PROBE: SIGNIFICANT CHANGE UP
SODIUM SERPL-SCNC: 142 MMOL/L — SIGNIFICANT CHANGE UP (ref 135–145)
TROPONIN I, HIGH SENSITIVITY RESULT: 70.91 NG/L — HIGH
TROPONIN I, HIGH SENSITIVITY RESULT: 74.23 NG/L — HIGH
WBC # BLD: 10.54 K/UL — HIGH (ref 3.8–10.5)
WBC # FLD AUTO: 10.54 K/UL — HIGH (ref 3.8–10.5)

## 2021-12-22 PROCEDURE — 85610 PROTHROMBIN TIME: CPT

## 2021-12-22 PROCEDURE — 71045 X-RAY EXAM CHEST 1 VIEW: CPT

## 2021-12-22 PROCEDURE — 80053 COMPREHEN METABOLIC PANEL: CPT

## 2021-12-22 PROCEDURE — 99284 EMERGENCY DEPT VISIT MOD MDM: CPT | Mod: CS

## 2021-12-22 PROCEDURE — 83880 ASSAY OF NATRIURETIC PEPTIDE: CPT

## 2021-12-22 PROCEDURE — 83735 ASSAY OF MAGNESIUM: CPT

## 2021-12-22 PROCEDURE — 71045 X-RAY EXAM CHEST 1 VIEW: CPT | Mod: 26

## 2021-12-22 PROCEDURE — 99285 EMERGENCY DEPT VISIT HI MDM: CPT | Mod: 25

## 2021-12-22 PROCEDURE — 36415 COLL VENOUS BLD VENIPUNCTURE: CPT

## 2021-12-22 PROCEDURE — 85730 THROMBOPLASTIN TIME PARTIAL: CPT

## 2021-12-22 PROCEDURE — 85025 COMPLETE CBC W/AUTO DIFF WBC: CPT

## 2021-12-22 PROCEDURE — 82803 BLOOD GASES ANY COMBINATION: CPT

## 2021-12-22 PROCEDURE — 84484 ASSAY OF TROPONIN QUANT: CPT

## 2021-12-22 PROCEDURE — 93010 ELECTROCARDIOGRAM REPORT: CPT

## 2021-12-22 PROCEDURE — 93005 ELECTROCARDIOGRAM TRACING: CPT

## 2021-12-22 PROCEDURE — U0003: CPT

## 2021-12-22 PROCEDURE — U0005: CPT

## 2021-12-22 PROCEDURE — 96374 THER/PROPH/DIAG INJ IV PUSH: CPT

## 2021-12-22 PROCEDURE — 94640 AIRWAY INHALATION TREATMENT: CPT

## 2021-12-22 RX ORDER — ALBUTEROL 90 UG/1
2.5 AEROSOL, METERED ORAL ONCE
Refills: 0 | Status: COMPLETED | OUTPATIENT
Start: 2021-12-22 | End: 2021-12-22

## 2021-12-22 RX ADMIN — ALBUTEROL 2.5 MILLIGRAM(S): 90 AEROSOL, METERED ORAL at 10:00

## 2021-12-22 RX ADMIN — ALBUTEROL 2.5 MILLIGRAM(S): 90 AEROSOL, METERED ORAL at 09:15

## 2021-12-22 RX ADMIN — Medication 125 MILLIGRAM(S): at 09:15

## 2021-12-22 RX ADMIN — ALBUTEROL 2.5 MILLIGRAM(S): 90 AEROSOL, METERED ORAL at 09:24

## 2021-12-22 NOTE — ED CLERICAL - NS ED CLERK NOTE PRE-ARRIVAL INFORMATION; ADDITIONAL PRE-ARRIVAL INFORMATION
This patient is enrolled in the readmission program and has active care navigation. This patient can be followed up by the care navigation team within 24 hours. To arrange close follow-up or to obtain additional clinical information about this patient, please call the contact number above.   Please call the hospitalist as needed to collaborate on further medical management ([phone])

## 2021-12-22 NOTE — ED PROVIDER NOTE - PROGRESS NOTE DETAILS
O2 98% on RA. pt feels much better. trop elevated but decreased from 2 days ago. Pt wants to go home. Spoke with son who is comfortable taking patient home. answered all questions will  patient. Lamin Lin M.D., Attending Physician

## 2021-12-22 NOTE — ED ADULT NURSE REASSESSMENT NOTE - NS ED NURSE REASSESS COMMENT FT1
Pt continues to have resp even and unlabored at rest. Pt and family verbalize desire for discharge with MD ragland to d/c at this time. Pt awaiting family  at approx 3-4pm.  Pt ordered food and will cont to monitor for comfort and safety.

## 2021-12-22 NOTE — ED PROVIDER NOTE - NSFOLLOWUPINSTRUCTIONS_ED_ALL_ED_FT
1. return for worsening symptoms or anything concerning to you  2. take all home meds as prescribed  3. follow up with your pmd call to make an appointment  4. follow up with your pulmonologist and cardiologist    Shortness of Breath    WHAT YOU NEED TO KNOW:    Shortness of breath is a feeling that you cannot get enough air when you breathe in. You may have this feeling only during activity, or all the time. Your symptoms can range from mild to severe. Shortness of breath may be a sign of a serious health condition that needs immediate care.    DISCHARGE INSTRUCTIONS:    Return to the emergency department if:     Your signs and symptoms are the same or worse within 24 hours of treatment.       The skin over your ribs or on your neck sinks in when you breathe.       You feel confused or dizzy.    Contact your healthcare provider if:     You have new or worsening symptoms.      You have questions or concerns about your condition or care.    Medicines:     Medicines may be used to treat the cause of your symptoms. You may need medicine to treat a bacterial infection or reduce anxiety. Other medicines may be used to open your airway, reduce swelling, or remove extra fluid. If you have a heart condition, you may need medicine to help your heart beat more strongly or regularly.      Take your medicine as directed. Contact your healthcare provider if you think your medicine is not helping or if you have side effects. Tell him or her if you are allergic to any medicine. Keep a list of the medicines, vitamins, and herbs you take. Include the amounts, and when and why you take them. Bring the list or the pill bottles to follow-up visits. Carry your medicine list with you in case of an emergency.    Manage shortness of breath:     Create an action plan. You and your healthcare provider can work together to create a plan for how to handle shortness of breath. The plan can include daily activities, treatment changes, and what to do if you have severe breathing problems.      Lean forward on your elbows when you sit. This helps your lungs expand and may make it easier to breathe.      Use pursed-lip breathing any time you feel short of breath. Breathe in through your nose and then slowly breathe out through your mouth with your lips slightly puckered. It should take you twice as long to breathe out as it did to breathe in.Breathe in Breathe out           Do not smoke. Nicotine and other chemicals in cigarettes and cigars can cause lung damage and make shortness of breath worse. Ask your healthcare provider for information if you currently smoke and need help to quit. E-cigarettes or smokeless tobacco still contain nicotine. Talk to your healthcare provider before you use these products.      Reach or maintain a healthy weight. Your healthcare provider can help you create a safe weight loss plan if you are overweight.      Exercise as directed. Exercise can help your lungs work more easily. Exercise can also help you lose weight if needed. Try to get at least 30 minutes of exercise most days of the week.    Follow up with your healthcare provider or specialist as directed: Write down your questions so you remember to ask them during your visits.

## 2021-12-22 NOTE — ED PROVIDER NOTE - CLINICAL SUMMARY MEDICAL DECISION MAKING FREE TEXT BOX
89F hx COPD recent admission for SOB COPD exacerbation with influenza presents to the ED for SOB. pt states she went home yesterday and feels like she cannot breath. no fevers. + cough. SOB worse with exertion and laying flat (sleeps in recliner). no cp abd pain nausea vomiting or diarrhea. exam with wheezing and tight lungs. concern for copd with influenza will obtain labs xray and reassess Lamin Lin M.D., Attending Physician

## 2021-12-22 NOTE — ED ADULT NURSE REASSESSMENT NOTE - NS ED NURSE REASSESS COMMENT FT1
Rounded on patient, patient would like to eat and have a drink, awaiting physician evaluation. Patient has blankets and call bell within reach

## 2021-12-22 NOTE — ED ADULT TRIAGE NOTE - CHIEF COMPLAINT QUOTE
patient bibems from home for shortness of breath and hypertensions which has been an ongoing issue for the patient for a period of time. pt is speaking in complete sentences, breathing is even and unlabored at this time. satting in the high 90's on RA. sent for EKG. .

## 2021-12-22 NOTE — ED PROVIDER NOTE - PHYSICAL EXAMINATION
Constitutional: NAD AAOx3  Eyes: PERRLA EOMI  Head: Normocephalic atraumatic  Mouth: MMM  Cardiac: regular rate normal peripheral pulses no LE swelling  Resp: wheezing and tight lungs  GI: Abd s/nt/nd  Neuro: CN2-12 intact  Skin: No rashes

## 2021-12-22 NOTE — ED ADULT NURSE NOTE - OBJECTIVE STATEMENT
Pt received from prior RN awake and alert and able to answer questions appropriately and make needs known. Pt alert and oriented to person place and situation. Pt somewhat poor historian in regards to health history.  Pt reports that she was just d/c from hospital and went to her house where son lives and takes care of her overnight with aide during day. Pt reports that when everyone went to bed at night she was alone and felt SOB. Pt reports that at rest at this time, she does not feel SOB.  Denies CP.  Pt EKG, labs, and monitor as ordered.  Pt hygiene performed and positioned for comfort. Pt resp even and unlabored at this time with O2 sat 95-96% ORA at this time.  Will cont to monitor.

## 2021-12-22 NOTE — ED PROVIDER NOTE - OBJECTIVE STATEMENT
89F hx COPD recent admission for SOB COPD exacerbation with influenza presents to the ED for SOB. pt states she went home yesterday and feels like she cannot breath. no fevers. + cough. SOB worse with exertion and laying flat (sleeps in recliner). no cp abd pain nausea vomiting or diarrhea.

## 2021-12-22 NOTE — ED PROVIDER NOTE - PATIENT PORTAL LINK FT
You can access the FollowMyHealth Patient Portal offered by [facility] by registering at the following website: [email or web address]. By joining [facility] [employer]’s FollowMyHealth portal, you will also be able to view your health information using other applications (apps) compatible with our system.

## 2021-12-28 DIAGNOSIS — J10.1 INFLUENZA DUE TO OTHER IDENTIFIED INFLUENZA VIRUS WITH OTHER RESPIRATORY MANIFESTATIONS: ICD-10-CM

## 2021-12-28 DIAGNOSIS — F03.90 UNSPECIFIED DEMENTIA WITHOUT BEHAVIORAL DISTURBANCE: ICD-10-CM

## 2021-12-28 DIAGNOSIS — I51.7 CARDIOMEGALY: ICD-10-CM

## 2021-12-28 DIAGNOSIS — T46.4X5A ADVERSE EFFECT OF ANGIOTENSIN-CONVERTING-ENZYME INHIBITORS, INITIAL ENCOUNTER: ICD-10-CM

## 2021-12-28 DIAGNOSIS — I34.2 NONRHEUMATIC MITRAL (VALVE) STENOSIS: ICD-10-CM

## 2021-12-28 DIAGNOSIS — I35.0 NONRHEUMATIC AORTIC (VALVE) STENOSIS: ICD-10-CM

## 2021-12-28 DIAGNOSIS — J44.1 CHRONIC OBSTRUCTIVE PULMONARY DISEASE WITH (ACUTE) EXACERBATION: ICD-10-CM

## 2021-12-28 DIAGNOSIS — Z20.822 CONTACT WITH AND (SUSPECTED) EXPOSURE TO COVID-19: ICD-10-CM

## 2021-12-28 DIAGNOSIS — N17.9 ACUTE KIDNEY FAILURE, UNSPECIFIED: ICD-10-CM

## 2021-12-28 DIAGNOSIS — J96.01 ACUTE RESPIRATORY FAILURE WITH HYPOXIA: ICD-10-CM

## 2021-12-28 DIAGNOSIS — I24.8 OTHER FORMS OF ACUTE ISCHEMIC HEART DISEASE: ICD-10-CM

## 2021-12-28 DIAGNOSIS — I25.10 ATHEROSCLEROTIC HEART DISEASE OF NATIVE CORONARY ARTERY WITHOUT ANGINA PECTORIS: ICD-10-CM

## 2021-12-28 DIAGNOSIS — E43 UNSPECIFIED SEVERE PROTEIN-CALORIE MALNUTRITION: ICD-10-CM

## 2022-01-01 ENCOUNTER — INPATIENT (INPATIENT)
Facility: HOSPITAL | Age: 87
LOS: 10 days | DRG: 64 | End: 2023-01-04
Attending: HOSPITALIST | Admitting: FAMILY MEDICINE
Payer: MEDICARE

## 2022-01-01 ENCOUNTER — TRANSCRIPTION ENCOUNTER (OUTPATIENT)
Age: 87
End: 2022-01-01

## 2022-01-01 ENCOUNTER — INPATIENT (INPATIENT)
Facility: HOSPITAL | Age: 87
LOS: 0 days | Discharge: NURSING, REHAB OR LONG-TERM CARE | DRG: 178 | End: 2022-12-22
Attending: HOSPITALIST | Admitting: INTERNAL MEDICINE
Payer: MEDICARE

## 2022-01-01 VITALS
HEIGHT: 62 IN | OXYGEN SATURATION: 92 % | WEIGHT: 169.98 LBS | HEART RATE: 59 BPM | TEMPERATURE: 100 F | SYSTOLIC BLOOD PRESSURE: 164 MMHG | RESPIRATION RATE: 19 BRPM | DIASTOLIC BLOOD PRESSURE: 95 MMHG

## 2022-01-01 VITALS
TEMPERATURE: 98 F | OXYGEN SATURATION: 94 % | DIASTOLIC BLOOD PRESSURE: 56 MMHG | HEART RATE: 81 BPM | RESPIRATION RATE: 18 BRPM | SYSTOLIC BLOOD PRESSURE: 152 MMHG

## 2022-01-01 VITALS — HEIGHT: 62 IN

## 2022-01-01 DIAGNOSIS — I48.91 UNSPECIFIED ATRIAL FIBRILLATION: ICD-10-CM
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
## 2022-01-01 DIAGNOSIS — I10 ESSENTIAL (PRIMARY) HYPERTENSION: ICD-10-CM

## 2022-01-01 DIAGNOSIS — Z90.89 ACQUIRED ABSENCE OF OTHER ORGANS: Chronic | ICD-10-CM

## 2022-01-01 DIAGNOSIS — R77.8 OTHER SPECIFIED ABNORMALITIES OF PLASMA PROTEINS: ICD-10-CM

## 2022-01-01 DIAGNOSIS — A41.9 SEPSIS, UNSPECIFIED ORGANISM: ICD-10-CM

## 2022-01-01 DIAGNOSIS — I63.9 CEREBRAL INFARCTION, UNSPECIFIED: ICD-10-CM

## 2022-01-01 DIAGNOSIS — I38 ENDOCARDITIS, VALVE UNSPECIFIED: ICD-10-CM

## 2022-01-01 DIAGNOSIS — U07.1 COVID-19: ICD-10-CM

## 2022-01-01 DIAGNOSIS — E78.5 HYPERLIPIDEMIA, UNSPECIFIED: ICD-10-CM

## 2022-01-01 LAB
A1C WITH ESTIMATED AVERAGE GLUCOSE RESULT: 6 % — HIGH (ref 4–5.6)
ADD ON TEST-SPECIMEN IN LAB: SIGNIFICANT CHANGE UP
ALBUMIN SERPL ELPH-MCNC: 2.1 G/DL — LOW (ref 3.3–5)
ALBUMIN SERPL ELPH-MCNC: 2.2 G/DL — LOW (ref 3.3–5)
ALBUMIN SERPL ELPH-MCNC: 2.7 G/DL — LOW (ref 3.3–5)
ALBUMIN SERPL ELPH-MCNC: 2.8 G/DL — LOW (ref 3.3–5)
ALBUMIN SERPL ELPH-MCNC: 3.5 G/DL — SIGNIFICANT CHANGE UP (ref 3.3–5)
ALP SERPL-CCNC: 101 U/L — SIGNIFICANT CHANGE UP (ref 40–120)
ALP SERPL-CCNC: 118 U/L — SIGNIFICANT CHANGE UP (ref 40–120)
ALP SERPL-CCNC: 60 U/L — SIGNIFICANT CHANGE UP (ref 40–120)
ALP SERPL-CCNC: 74 U/L — SIGNIFICANT CHANGE UP (ref 40–120)
ALT FLD-CCNC: 21 U/L — SIGNIFICANT CHANGE UP (ref 12–78)
ALT FLD-CCNC: 22 U/L — SIGNIFICANT CHANGE UP (ref 12–78)
ALT FLD-CCNC: 30 U/L — SIGNIFICANT CHANGE UP (ref 12–78)
ALT FLD-CCNC: 30 U/L — SIGNIFICANT CHANGE UP (ref 12–78)
ANION GAP SERPL CALC-SCNC: 10 MMOL/L — SIGNIFICANT CHANGE UP (ref 5–17)
ANION GAP SERPL CALC-SCNC: 11 MMOL/L — SIGNIFICANT CHANGE UP (ref 5–17)
ANION GAP SERPL CALC-SCNC: 5 MMOL/L — SIGNIFICANT CHANGE UP (ref 5–17)
ANION GAP SERPL CALC-SCNC: 7 MMOL/L — SIGNIFICANT CHANGE UP (ref 5–17)
ANION GAP SERPL CALC-SCNC: 7 MMOL/L — SIGNIFICANT CHANGE UP (ref 5–17)
ANION GAP SERPL CALC-SCNC: 8 MMOL/L — SIGNIFICANT CHANGE UP (ref 5–17)
ANION GAP SERPL CALC-SCNC: 9 MMOL/L — SIGNIFICANT CHANGE UP (ref 5–17)
ANION GAP SERPL CALC-SCNC: 9 MMOL/L — SIGNIFICANT CHANGE UP (ref 5–17)
APPEARANCE UR: CLEAR — SIGNIFICANT CHANGE UP
APPEARANCE UR: CLEAR — SIGNIFICANT CHANGE UP
APTT BLD: 109 SEC — HIGH (ref 27.5–35.5)
APTT BLD: 123.9 SEC — CRITICAL HIGH (ref 27.5–35.5)
APTT BLD: 128.6 SEC — CRITICAL HIGH (ref 27.5–35.5)
APTT BLD: 23.4 SEC — LOW (ref 27.5–35.5)
APTT BLD: 25.4 SEC — LOW (ref 27.5–35.5)
APTT BLD: 29.9 SEC — SIGNIFICANT CHANGE UP (ref 27.5–35.5)
APTT BLD: 30.6 SEC — SIGNIFICANT CHANGE UP (ref 27.5–35.5)
APTT BLD: 32.4 SEC — SIGNIFICANT CHANGE UP (ref 27.5–35.5)
APTT BLD: 78.2 SEC — HIGH (ref 27.5–35.5)
APTT BLD: 81.7 SEC — HIGH (ref 27.5–35.5)
AST SERPL-CCNC: 16 U/L — SIGNIFICANT CHANGE UP (ref 15–37)
AST SERPL-CCNC: 19 U/L — SIGNIFICANT CHANGE UP (ref 15–37)
AST SERPL-CCNC: 32 U/L — SIGNIFICANT CHANGE UP (ref 15–37)
AST SERPL-CCNC: 51 U/L — HIGH (ref 15–37)
BACTERIA # UR AUTO: ABNORMAL
BASOPHILS # BLD AUTO: 0.04 K/UL — SIGNIFICANT CHANGE UP (ref 0–0.2)
BASOPHILS # BLD AUTO: 0.04 K/UL — SIGNIFICANT CHANGE UP (ref 0–0.2)
BASOPHILS # BLD AUTO: 0.05 K/UL — SIGNIFICANT CHANGE UP (ref 0–0.2)
BASOPHILS NFR BLD AUTO: 0.3 % — SIGNIFICANT CHANGE UP (ref 0–2)
BASOPHILS NFR BLD AUTO: 0.3 % — SIGNIFICANT CHANGE UP (ref 0–2)
BASOPHILS NFR BLD AUTO: 0.5 % — SIGNIFICANT CHANGE UP (ref 0–2)
BILIRUB SERPL-MCNC: 0.4 MG/DL — SIGNIFICANT CHANGE UP (ref 0.2–1.2)
BILIRUB SERPL-MCNC: 0.4 MG/DL — SIGNIFICANT CHANGE UP (ref 0.2–1.2)
BILIRUB SERPL-MCNC: 0.7 MG/DL — SIGNIFICANT CHANGE UP (ref 0.2–1.2)
BILIRUB SERPL-MCNC: 0.7 MG/DL — SIGNIFICANT CHANGE UP (ref 0.2–1.2)
BILIRUB UR-MCNC: NEGATIVE — SIGNIFICANT CHANGE UP
BILIRUB UR-MCNC: NEGATIVE — SIGNIFICANT CHANGE UP
BLD GP AB SCN SERPL QL: SIGNIFICANT CHANGE UP
BLD GP AB SCN SERPL QL: SIGNIFICANT CHANGE UP
BUN SERPL-MCNC: 102 MG/DL — HIGH (ref 7–23)
BUN SERPL-MCNC: 104 MG/DL — HIGH (ref 7–23)
BUN SERPL-MCNC: 109 MG/DL — HIGH (ref 7–23)
BUN SERPL-MCNC: 22 MG/DL — SIGNIFICANT CHANGE UP (ref 7–23)
BUN SERPL-MCNC: 26 MG/DL — HIGH (ref 7–23)
BUN SERPL-MCNC: 40 MG/DL — HIGH (ref 7–23)
BUN SERPL-MCNC: 48 MG/DL — HIGH (ref 7–23)
BUN SERPL-MCNC: 71 MG/DL — HIGH (ref 7–23)
BUN SERPL-MCNC: 85 MG/DL — HIGH (ref 7–23)
BUN SERPL-MCNC: 98 MG/DL — HIGH (ref 7–23)
C DIFF BY PCR RESULT: SIGNIFICANT CHANGE UP
CALCIUM SERPL-MCNC: 8.9 MG/DL — SIGNIFICANT CHANGE UP (ref 8.5–10.1)
CALCIUM SERPL-MCNC: 9 MG/DL — SIGNIFICANT CHANGE UP (ref 8.5–10.1)
CALCIUM SERPL-MCNC: 9 MG/DL — SIGNIFICANT CHANGE UP (ref 8.5–10.1)
CALCIUM SERPL-MCNC: 9.1 MG/DL — SIGNIFICANT CHANGE UP (ref 8.5–10.1)
CALCIUM SERPL-MCNC: 9.3 MG/DL — SIGNIFICANT CHANGE UP (ref 8.5–10.1)
CALCIUM SERPL-MCNC: 9.3 MG/DL — SIGNIFICANT CHANGE UP (ref 8.5–10.1)
CALCIUM SERPL-MCNC: 9.4 MG/DL — SIGNIFICANT CHANGE UP (ref 8.5–10.1)
CALCIUM SERPL-MCNC: 9.5 MG/DL — SIGNIFICANT CHANGE UP (ref 8.5–10.1)
CALCIUM SERPL-MCNC: 9.5 MG/DL — SIGNIFICANT CHANGE UP (ref 8.5–10.1)
CALCIUM SERPL-MCNC: 9.7 MG/DL — SIGNIFICANT CHANGE UP (ref 8.5–10.1)
CHLORIDE SERPL-SCNC: 105 MMOL/L — SIGNIFICANT CHANGE UP (ref 96–108)
CHLORIDE SERPL-SCNC: 106 MMOL/L — SIGNIFICANT CHANGE UP (ref 96–108)
CHLORIDE SERPL-SCNC: 108 MMOL/L — SIGNIFICANT CHANGE UP (ref 96–108)
CHLORIDE SERPL-SCNC: 111 MMOL/L — HIGH (ref 96–108)
CHLORIDE SERPL-SCNC: 113 MMOL/L — HIGH (ref 96–108)
CHLORIDE SERPL-SCNC: 115 MMOL/L — HIGH (ref 96–108)
CHLORIDE SERPL-SCNC: 117 MMOL/L — HIGH (ref 96–108)
CHLORIDE SERPL-SCNC: 118 MMOL/L — HIGH (ref 96–108)
CHLORIDE SERPL-SCNC: 122 MMOL/L — HIGH (ref 96–108)
CHLORIDE SERPL-SCNC: 123 MMOL/L — HIGH (ref 96–108)
CHOLEST SERPL-MCNC: 174 MG/DL — SIGNIFICANT CHANGE UP
CO2 SERPL-SCNC: 15 MMOL/L — LOW (ref 22–31)
CO2 SERPL-SCNC: 16 MMOL/L — LOW (ref 22–31)
CO2 SERPL-SCNC: 17 MMOL/L — LOW (ref 22–31)
CO2 SERPL-SCNC: 21 MMOL/L — LOW (ref 22–31)
CO2 SERPL-SCNC: 21 MMOL/L — LOW (ref 22–31)
CO2 SERPL-SCNC: 24 MMOL/L — SIGNIFICANT CHANGE UP (ref 22–31)
CO2 SERPL-SCNC: 27 MMOL/L — SIGNIFICANT CHANGE UP (ref 22–31)
COLOR SPEC: YELLOW — SIGNIFICANT CHANGE UP
COLOR SPEC: YELLOW — SIGNIFICANT CHANGE UP
CREAT SERPL-MCNC: 1.15 MG/DL — SIGNIFICANT CHANGE UP (ref 0.5–1.3)
CREAT SERPL-MCNC: 1.16 MG/DL — SIGNIFICANT CHANGE UP (ref 0.5–1.3)
CREAT SERPL-MCNC: 1.38 MG/DL — HIGH (ref 0.5–1.3)
CREAT SERPL-MCNC: 1.59 MG/DL — HIGH (ref 0.5–1.3)
CREAT SERPL-MCNC: 2.45 MG/DL — HIGH (ref 0.5–1.3)
CREAT SERPL-MCNC: 2.55 MG/DL — HIGH (ref 0.5–1.3)
CREAT SERPL-MCNC: 3.49 MG/DL — HIGH (ref 0.5–1.3)
CREAT SERPL-MCNC: 3.78 MG/DL — HIGH (ref 0.5–1.3)
CREAT SERPL-MCNC: 4.01 MG/DL — HIGH (ref 0.5–1.3)
CREAT SERPL-MCNC: 4.03 MG/DL — HIGH (ref 0.5–1.3)
CULTURE RESULTS: SIGNIFICANT CHANGE UP
D DIMER BLD IA.RAPID-MCNC: 279 NG/ML DDU — HIGH
DIFF PNL FLD: ABNORMAL
DIFF PNL FLD: NEGATIVE — SIGNIFICANT CHANGE UP
EGFR: 10 ML/MIN/1.73M2 — LOW
EGFR: 10 ML/MIN/1.73M2 — LOW
EGFR: 11 ML/MIN/1.73M2 — LOW
EGFR: 12 ML/MIN/1.73M2 — LOW
EGFR: 17 ML/MIN/1.73M2 — LOW
EGFR: 18 ML/MIN/1.73M2 — LOW
EGFR: 30 ML/MIN/1.73M2 — LOW
EGFR: 35 ML/MIN/1.73M2 — LOW
EGFR: 43 ML/MIN/1.73M2 — LOW
EGFR: 44 ML/MIN/1.73M2 — LOW
EOSINOPHIL # BLD AUTO: 0.02 K/UL — SIGNIFICANT CHANGE UP (ref 0–0.5)
EOSINOPHIL # BLD AUTO: 0.03 K/UL — SIGNIFICANT CHANGE UP (ref 0–0.5)
EOSINOPHIL # BLD AUTO: 0.06 K/UL — SIGNIFICANT CHANGE UP (ref 0–0.5)
EOSINOPHIL NFR BLD AUTO: 0.1 % — SIGNIFICANT CHANGE UP (ref 0–6)
EOSINOPHIL NFR BLD AUTO: 0.3 % — SIGNIFICANT CHANGE UP (ref 0–6)
EOSINOPHIL NFR BLD AUTO: 0.5 % — SIGNIFICANT CHANGE UP (ref 0–6)
EPI CELLS # UR: SIGNIFICANT CHANGE UP
ESTIMATED AVERAGE GLUCOSE: 126 MG/DL — HIGH (ref 68–114)
FLUAV AG NPH QL: SIGNIFICANT CHANGE UP
FLUBV AG NPH QL: SIGNIFICANT CHANGE UP
GLUCOSE SERPL-MCNC: 126 MG/DL — HIGH (ref 70–99)
GLUCOSE SERPL-MCNC: 135 MG/DL — HIGH (ref 70–99)
GLUCOSE SERPL-MCNC: 147 MG/DL — HIGH (ref 70–99)
GLUCOSE SERPL-MCNC: 153 MG/DL — HIGH (ref 70–99)
GLUCOSE SERPL-MCNC: 154 MG/DL — HIGH (ref 70–99)
GLUCOSE SERPL-MCNC: 155 MG/DL — HIGH (ref 70–99)
GLUCOSE SERPL-MCNC: 157 MG/DL — HIGH (ref 70–99)
GLUCOSE SERPL-MCNC: 166 MG/DL — HIGH (ref 70–99)
GLUCOSE SERPL-MCNC: 171 MG/DL — HIGH (ref 70–99)
GLUCOSE SERPL-MCNC: 92 MG/DL — SIGNIFICANT CHANGE UP (ref 70–99)
GLUCOSE UR QL: NEGATIVE — SIGNIFICANT CHANGE UP
GLUCOSE UR QL: NEGATIVE — SIGNIFICANT CHANGE UP
HCT VFR BLD CALC: 22.3 % — LOW (ref 34.5–45)
HCT VFR BLD CALC: 24.4 % — LOW (ref 34.5–45)
HCT VFR BLD CALC: 24.5 % — LOW (ref 34.5–45)
HCT VFR BLD CALC: 24.9 % — LOW (ref 34.5–45)
HCT VFR BLD CALC: 26.2 % — LOW (ref 34.5–45)
HCT VFR BLD CALC: 26.4 % — LOW (ref 34.5–45)
HCT VFR BLD CALC: 27.1 % — LOW (ref 34.5–45)
HCT VFR BLD CALC: 27.5 % — LOW (ref 34.5–45)
HCT VFR BLD CALC: 28.4 % — LOW (ref 34.5–45)
HCT VFR BLD CALC: 28.7 % — LOW (ref 34.5–45)
HCT VFR BLD CALC: 33.7 % — LOW (ref 34.5–45)
HCT VFR BLD CALC: 35.6 % — SIGNIFICANT CHANGE UP (ref 34.5–45)
HCT VFR BLD CALC: 38.4 % — SIGNIFICANT CHANGE UP (ref 34.5–45)
HCT VFR BLD CALC: 38.6 % — SIGNIFICANT CHANGE UP (ref 34.5–45)
HCT VFR BLD CALC: 43 % — SIGNIFICANT CHANGE UP (ref 34.5–45)
HDLC SERPL-MCNC: 82 MG/DL — SIGNIFICANT CHANGE UP
HGB BLD-MCNC: 10.8 G/DL — LOW (ref 11.5–15.5)
HGB BLD-MCNC: 11.5 G/DL — SIGNIFICANT CHANGE UP (ref 11.5–15.5)
HGB BLD-MCNC: 12.7 G/DL — SIGNIFICANT CHANGE UP (ref 11.5–15.5)
HGB BLD-MCNC: 12.7 G/DL — SIGNIFICANT CHANGE UP (ref 11.5–15.5)
HGB BLD-MCNC: 14.4 G/DL — SIGNIFICANT CHANGE UP (ref 11.5–15.5)
HGB BLD-MCNC: 7.1 G/DL — LOW (ref 11.5–15.5)
HGB BLD-MCNC: 7.9 G/DL — LOW (ref 11.5–15.5)
HGB BLD-MCNC: 7.9 G/DL — LOW (ref 11.5–15.5)
HGB BLD-MCNC: 8.3 G/DL — LOW (ref 11.5–15.5)
HGB BLD-MCNC: 8.8 G/DL — LOW (ref 11.5–15.5)
HGB BLD-MCNC: 9 G/DL — LOW (ref 11.5–15.5)
HGB BLD-MCNC: 9.3 G/DL — LOW (ref 11.5–15.5)
HGB BLD-MCNC: 9.4 G/DL — LOW (ref 11.5–15.5)
IMM GRANULOCYTES NFR BLD AUTO: 0.4 % — SIGNIFICANT CHANGE UP (ref 0–0.9)
IMM GRANULOCYTES NFR BLD AUTO: 0.4 % — SIGNIFICANT CHANGE UP (ref 0–0.9)
IMM GRANULOCYTES NFR BLD AUTO: 0.5 % — SIGNIFICANT CHANGE UP (ref 0–0.9)
INR BLD: 1.15 RATIO — SIGNIFICANT CHANGE UP (ref 0.88–1.16)
INR BLD: 1.16 RATIO — SIGNIFICANT CHANGE UP (ref 0.88–1.16)
INR BLD: 1.2 RATIO — HIGH (ref 0.88–1.16)
INR BLD: 1.21 RATIO — HIGH (ref 0.88–1.16)
KETONES UR-MCNC: NEGATIVE — SIGNIFICANT CHANGE UP
KETONES UR-MCNC: NEGATIVE — SIGNIFICANT CHANGE UP
LACTATE SERPL-SCNC: 0.9 MMOL/L — SIGNIFICANT CHANGE UP (ref 0.7–2)
LACTATE SERPL-SCNC: 1.9 MMOL/L — SIGNIFICANT CHANGE UP (ref 0.7–2)
LEUKOCYTE ESTERASE UR-ACNC: ABNORMAL
LEUKOCYTE ESTERASE UR-ACNC: ABNORMAL
LIPID PNL WITH DIRECT LDL SERPL: 76 MG/DL — SIGNIFICANT CHANGE UP
LYMPHOCYTES # BLD AUTO: 0.83 K/UL — LOW (ref 1–3.3)
LYMPHOCYTES # BLD AUTO: 0.87 K/UL — LOW (ref 1–3.3)
LYMPHOCYTES # BLD AUTO: 1.68 K/UL — SIGNIFICANT CHANGE UP (ref 1–3.3)
LYMPHOCYTES # BLD AUTO: 10.9 % — LOW (ref 13–44)
LYMPHOCYTES # BLD AUTO: 6.8 % — LOW (ref 13–44)
LYMPHOCYTES # BLD AUTO: 9.2 % — LOW (ref 13–44)
MAGNESIUM SERPL-MCNC: 2.3 MG/DL — SIGNIFICANT CHANGE UP (ref 1.6–2.6)
MCHC RBC-ENTMCNC: 29.2 PG — SIGNIFICANT CHANGE UP (ref 27–34)
MCHC RBC-ENTMCNC: 29.6 PG — SIGNIFICANT CHANGE UP (ref 27–34)
MCHC RBC-ENTMCNC: 29.7 PG — SIGNIFICANT CHANGE UP (ref 27–34)
MCHC RBC-ENTMCNC: 29.8 PG — SIGNIFICANT CHANGE UP (ref 27–34)
MCHC RBC-ENTMCNC: 29.9 PG — SIGNIFICANT CHANGE UP (ref 27–34)
MCHC RBC-ENTMCNC: 30 PG — SIGNIFICANT CHANGE UP (ref 27–34)
MCHC RBC-ENTMCNC: 30.1 PG — SIGNIFICANT CHANGE UP (ref 27–34)
MCHC RBC-ENTMCNC: 30.6 PG — SIGNIFICANT CHANGE UP (ref 27–34)
MCHC RBC-ENTMCNC: 30.7 PG — SIGNIFICANT CHANGE UP (ref 27–34)
MCHC RBC-ENTMCNC: 31.8 GM/DL — LOW (ref 32–36)
MCHC RBC-ENTMCNC: 32 GM/DL — SIGNIFICANT CHANGE UP (ref 32–36)
MCHC RBC-ENTMCNC: 32.3 GM/DL — SIGNIFICANT CHANGE UP (ref 32–36)
MCHC RBC-ENTMCNC: 32.4 GM/DL — SIGNIFICANT CHANGE UP (ref 32–36)
MCHC RBC-ENTMCNC: 32.7 GM/DL — SIGNIFICANT CHANGE UP (ref 32–36)
MCHC RBC-ENTMCNC: 32.9 GM/DL — SIGNIFICANT CHANGE UP (ref 32–36)
MCHC RBC-ENTMCNC: 33.1 GM/DL — SIGNIFICANT CHANGE UP (ref 32–36)
MCHC RBC-ENTMCNC: 33.3 GM/DL — SIGNIFICANT CHANGE UP (ref 32–36)
MCHC RBC-ENTMCNC: 33.5 GM/DL — SIGNIFICANT CHANGE UP (ref 32–36)
MCHC RBC-ENTMCNC: 33.6 GM/DL — SIGNIFICANT CHANGE UP (ref 32–36)
MCHC RBC-ENTMCNC: 34.1 GM/DL — SIGNIFICANT CHANGE UP (ref 32–36)
MCV RBC AUTO: 89 FL — SIGNIFICANT CHANGE UP (ref 80–100)
MCV RBC AUTO: 90 FL — SIGNIFICANT CHANGE UP (ref 80–100)
MCV RBC AUTO: 90.1 FL — SIGNIFICANT CHANGE UP (ref 80–100)
MCV RBC AUTO: 90.2 FL — SIGNIFICANT CHANGE UP (ref 80–100)
MCV RBC AUTO: 90.4 FL — SIGNIFICANT CHANGE UP (ref 80–100)
MCV RBC AUTO: 90.8 FL — SIGNIFICANT CHANGE UP (ref 80–100)
MCV RBC AUTO: 91 FL — SIGNIFICANT CHANGE UP (ref 80–100)
MCV RBC AUTO: 91.8 FL — SIGNIFICANT CHANGE UP (ref 80–100)
MCV RBC AUTO: 91.8 FL — SIGNIFICANT CHANGE UP (ref 80–100)
MCV RBC AUTO: 92.3 FL — SIGNIFICANT CHANGE UP (ref 80–100)
MCV RBC AUTO: 92.6 FL — SIGNIFICANT CHANGE UP (ref 80–100)
MONOCYTES # BLD AUTO: 0.8 K/UL — SIGNIFICANT CHANGE UP (ref 0–0.9)
MONOCYTES # BLD AUTO: 0.94 K/UL — HIGH (ref 0–0.9)
MONOCYTES # BLD AUTO: 1.12 K/UL — HIGH (ref 0–0.9)
MONOCYTES NFR BLD AUTO: 7.3 % — SIGNIFICANT CHANGE UP (ref 2–14)
MONOCYTES NFR BLD AUTO: 7.7 % — SIGNIFICANT CHANGE UP (ref 2–14)
MONOCYTES NFR BLD AUTO: 8.5 % — SIGNIFICANT CHANGE UP (ref 2–14)
NEUTROPHILS # BLD AUTO: 10.26 K/UL — HIGH (ref 1.8–7.4)
NEUTROPHILS # BLD AUTO: 12.52 K/UL — HIGH (ref 1.8–7.4)
NEUTROPHILS # BLD AUTO: 7.62 K/UL — HIGH (ref 1.8–7.4)
NEUTROPHILS NFR BLD AUTO: 81 % — HIGH (ref 43–77)
NEUTROPHILS NFR BLD AUTO: 81.1 % — HIGH (ref 43–77)
NEUTROPHILS NFR BLD AUTO: 84.2 % — HIGH (ref 43–77)
NITRITE UR-MCNC: NEGATIVE — SIGNIFICANT CHANGE UP
NITRITE UR-MCNC: NEGATIVE — SIGNIFICANT CHANGE UP
NON HDL CHOLESTEROL: 92 MG/DL — SIGNIFICANT CHANGE UP
PH UR: 5 — SIGNIFICANT CHANGE UP (ref 5–8)
PH UR: 5 — SIGNIFICANT CHANGE UP (ref 5–8)
PHOSPHATE SERPL-MCNC: 4 MG/DL — SIGNIFICANT CHANGE UP (ref 2.5–4.5)
PHOSPHATE SERPL-MCNC: 5.2 MG/DL — HIGH (ref 2.5–4.5)
PLATELET # BLD AUTO: 225 K/UL — SIGNIFICANT CHANGE UP (ref 150–400)
PLATELET # BLD AUTO: 250 K/UL — SIGNIFICANT CHANGE UP (ref 150–400)
PLATELET # BLD AUTO: 251 K/UL — SIGNIFICANT CHANGE UP (ref 150–400)
PLATELET # BLD AUTO: 252 K/UL — SIGNIFICANT CHANGE UP (ref 150–400)
PLATELET # BLD AUTO: 253 K/UL — SIGNIFICANT CHANGE UP (ref 150–400)
PLATELET # BLD AUTO: 258 K/UL — SIGNIFICANT CHANGE UP (ref 150–400)
PLATELET # BLD AUTO: 264 K/UL — SIGNIFICANT CHANGE UP (ref 150–400)
PLATELET # BLD AUTO: 267 K/UL — SIGNIFICANT CHANGE UP (ref 150–400)
PLATELET # BLD AUTO: 273 K/UL — SIGNIFICANT CHANGE UP (ref 150–400)
PLATELET # BLD AUTO: 295 K/UL — SIGNIFICANT CHANGE UP (ref 150–400)
PLATELET # BLD AUTO: 305 K/UL — SIGNIFICANT CHANGE UP (ref 150–400)
POTASSIUM SERPL-MCNC: 4.2 MMOL/L — SIGNIFICANT CHANGE UP (ref 3.5–5.3)
POTASSIUM SERPL-MCNC: 4.7 MMOL/L — SIGNIFICANT CHANGE UP (ref 3.5–5.3)
POTASSIUM SERPL-MCNC: 4.8 MMOL/L — SIGNIFICANT CHANGE UP (ref 3.5–5.3)
POTASSIUM SERPL-MCNC: 5 MMOL/L — SIGNIFICANT CHANGE UP (ref 3.5–5.3)
POTASSIUM SERPL-MCNC: 5.3 MMOL/L — SIGNIFICANT CHANGE UP (ref 3.5–5.3)
POTASSIUM SERPL-MCNC: 5.4 MMOL/L — HIGH (ref 3.5–5.3)
POTASSIUM SERPL-MCNC: 5.6 MMOL/L — HIGH (ref 3.5–5.3)
POTASSIUM SERPL-MCNC: 5.6 MMOL/L — HIGH (ref 3.5–5.3)
POTASSIUM SERPL-SCNC: 4.2 MMOL/L — SIGNIFICANT CHANGE UP (ref 3.5–5.3)
POTASSIUM SERPL-SCNC: 4.7 MMOL/L — SIGNIFICANT CHANGE UP (ref 3.5–5.3)
POTASSIUM SERPL-SCNC: 4.8 MMOL/L — SIGNIFICANT CHANGE UP (ref 3.5–5.3)
POTASSIUM SERPL-SCNC: 5 MMOL/L — SIGNIFICANT CHANGE UP (ref 3.5–5.3)
POTASSIUM SERPL-SCNC: 5.3 MMOL/L — SIGNIFICANT CHANGE UP (ref 3.5–5.3)
POTASSIUM SERPL-SCNC: 5.4 MMOL/L — HIGH (ref 3.5–5.3)
POTASSIUM SERPL-SCNC: 5.6 MMOL/L — HIGH (ref 3.5–5.3)
POTASSIUM SERPL-SCNC: 5.6 MMOL/L — HIGH (ref 3.5–5.3)
PROT SERPL-MCNC: 5.5 GM/DL — LOW (ref 6–8.3)
PROT SERPL-MCNC: 6.1 GM/DL — SIGNIFICANT CHANGE UP (ref 6–8.3)
PROT SERPL-MCNC: 7.5 GM/DL — SIGNIFICANT CHANGE UP (ref 6–8.3)
PROT SERPL-MCNC: 7.6 GM/DL — SIGNIFICANT CHANGE UP (ref 6–8.3)
PROT UR-MCNC: NEGATIVE — SIGNIFICANT CHANGE UP
PROT UR-MCNC: NEGATIVE — SIGNIFICANT CHANGE UP
PROTHROM AB SERPL-ACNC: 13.4 SEC — SIGNIFICANT CHANGE UP (ref 10.5–13.4)
PROTHROM AB SERPL-ACNC: 13.5 SEC — HIGH (ref 10.5–13.4)
PROTHROM AB SERPL-ACNC: 14 SEC — HIGH (ref 10.5–13.4)
PROTHROM AB SERPL-ACNC: 14.1 SEC — HIGH (ref 10.5–13.4)
RBC # BLD: 2.43 M/UL — LOW (ref 3.8–5.2)
RBC # BLD: 2.76 M/UL — LOW (ref 3.8–5.2)
RBC # BLD: 2.88 M/UL — LOW (ref 3.8–5.2)
RBC # BLD: 2.93 M/UL — LOW (ref 3.8–5.2)
RBC # BLD: 3.03 M/UL — LOW (ref 3.8–5.2)
RBC # BLD: 3.1 M/UL — LOW (ref 3.8–5.2)
RBC # BLD: 3.65 M/UL — LOW (ref 3.8–5.2)
RBC # BLD: 3.88 M/UL — SIGNIFICANT CHANGE UP (ref 3.8–5.2)
RBC # BLD: 4.25 M/UL — SIGNIFICANT CHANGE UP (ref 3.8–5.2)
RBC # BLD: 4.29 M/UL — SIGNIFICANT CHANGE UP (ref 3.8–5.2)
RBC # BLD: 4.83 M/UL — SIGNIFICANT CHANGE UP (ref 3.8–5.2)
RBC # FLD: 14.4 % — SIGNIFICANT CHANGE UP (ref 10.3–14.5)
RBC # FLD: 14.5 % — SIGNIFICANT CHANGE UP (ref 10.3–14.5)
RBC # FLD: 14.5 % — SIGNIFICANT CHANGE UP (ref 10.3–14.5)
RBC # FLD: 14.6 % — HIGH (ref 10.3–14.5)
RBC # FLD: 14.7 % — HIGH (ref 10.3–14.5)
RBC # FLD: 14.8 % — HIGH (ref 10.3–14.5)
RBC # FLD: 14.8 % — HIGH (ref 10.3–14.5)
RBC CASTS # UR COMP ASSIST: SIGNIFICANT CHANGE UP /HPF (ref 0–4)
RSV RNA NPH QL NAA+NON-PROBE: SIGNIFICANT CHANGE UP
SARS-COV-2 RNA SPEC QL NAA+PROBE: DETECTED
SODIUM SERPL-SCNC: 136 MMOL/L — SIGNIFICANT CHANGE UP (ref 135–145)
SODIUM SERPL-SCNC: 137 MMOL/L — SIGNIFICANT CHANGE UP (ref 135–145)
SODIUM SERPL-SCNC: 138 MMOL/L — SIGNIFICANT CHANGE UP (ref 135–145)
SODIUM SERPL-SCNC: 139 MMOL/L — SIGNIFICANT CHANGE UP (ref 135–145)
SODIUM SERPL-SCNC: 140 MMOL/L — SIGNIFICANT CHANGE UP (ref 135–145)
SODIUM SERPL-SCNC: 141 MMOL/L — SIGNIFICANT CHANGE UP (ref 135–145)
SODIUM SERPL-SCNC: 143 MMOL/L — SIGNIFICANT CHANGE UP (ref 135–145)
SODIUM SERPL-SCNC: 144 MMOL/L — SIGNIFICANT CHANGE UP (ref 135–145)
SODIUM SERPL-SCNC: 146 MMOL/L — HIGH (ref 135–145)
SODIUM SERPL-SCNC: 148 MMOL/L — HIGH (ref 135–145)
SP GR SPEC: 1.01 — SIGNIFICANT CHANGE UP (ref 1.01–1.02)
SP GR SPEC: 1.01 — SIGNIFICANT CHANGE UP (ref 1.01–1.02)
SPECIMEN SOURCE: SIGNIFICANT CHANGE UP
TRIGL SERPL-MCNC: 78 MG/DL — SIGNIFICANT CHANGE UP
TROPONIN I, HIGH SENSITIVITY RESULT: 44.66 NG/L — SIGNIFICANT CHANGE UP
TROPONIN I, HIGH SENSITIVITY RESULT: 4851.1 NG/L — HIGH
TROPONIN I, HIGH SENSITIVITY RESULT: 4870.41 NG/L — HIGH
TSH SERPL-MCNC: 0.71 UU/ML — SIGNIFICANT CHANGE UP (ref 0.34–4.82)
UROBILINOGEN FLD QL: NEGATIVE — SIGNIFICANT CHANGE UP
UROBILINOGEN FLD QL: NEGATIVE — SIGNIFICANT CHANGE UP
WBC # BLD: 10.6 K/UL — HIGH (ref 3.8–10.5)
WBC # BLD: 11.21 K/UL — HIGH (ref 3.8–10.5)
WBC # BLD: 11.87 K/UL — HIGH (ref 3.8–10.5)
WBC # BLD: 12.02 K/UL — HIGH (ref 3.8–10.5)
WBC # BLD: 12.19 K/UL — HIGH (ref 3.8–10.5)
WBC # BLD: 12.4 K/UL — HIGH (ref 3.8–10.5)
WBC # BLD: 13.32 K/UL — HIGH (ref 3.8–10.5)
WBC # BLD: 13.83 K/UL — HIGH (ref 3.8–10.5)
WBC # BLD: 15.44 K/UL — HIGH (ref 3.8–10.5)
WBC # BLD: 16.11 K/UL — HIGH (ref 3.8–10.5)
WBC # BLD: 9.41 K/UL — SIGNIFICANT CHANGE UP (ref 3.8–10.5)
WBC # FLD AUTO: 10.6 K/UL — HIGH (ref 3.8–10.5)
WBC # FLD AUTO: 11.21 K/UL — HIGH (ref 3.8–10.5)
WBC # FLD AUTO: 11.87 K/UL — HIGH (ref 3.8–10.5)
WBC # FLD AUTO: 12.02 K/UL — HIGH (ref 3.8–10.5)
WBC # FLD AUTO: 12.19 K/UL — HIGH (ref 3.8–10.5)
WBC # FLD AUTO: 12.4 K/UL — HIGH (ref 3.8–10.5)
WBC # FLD AUTO: 13.32 K/UL — HIGH (ref 3.8–10.5)
WBC # FLD AUTO: 13.83 K/UL — HIGH (ref 3.8–10.5)
WBC # FLD AUTO: 15.44 K/UL — HIGH (ref 3.8–10.5)
WBC # FLD AUTO: 16.11 K/UL — HIGH (ref 3.8–10.5)
WBC # FLD AUTO: 9.41 K/UL — SIGNIFICANT CHANGE UP (ref 3.8–10.5)
WBC UR QL: ABNORMAL /HPF (ref 0–5)

## 2022-01-01 PROCEDURE — 93010 ELECTROCARDIOGRAM REPORT: CPT

## 2022-01-01 PROCEDURE — 92507 TX SP LANG VOICE COMM INDIV: CPT | Mod: GN

## 2022-01-01 PROCEDURE — 71250 CT THORAX DX C-: CPT

## 2022-01-01 PROCEDURE — 99291 CRITICAL CARE FIRST HOUR: CPT

## 2022-01-01 PROCEDURE — 99223 1ST HOSP IP/OBS HIGH 75: CPT

## 2022-01-01 PROCEDURE — 99223 1ST HOSP IP/OBS HIGH 75: CPT | Mod: GC

## 2022-01-01 PROCEDURE — 71045 X-RAY EXAM CHEST 1 VIEW: CPT

## 2022-01-01 PROCEDURE — 84132 ASSAY OF SERUM POTASSIUM: CPT

## 2022-01-01 PROCEDURE — 99233 SBSQ HOSP IP/OBS HIGH 50: CPT

## 2022-01-01 PROCEDURE — 99239 HOSP IP/OBS DSCHRG MGMT >30: CPT

## 2022-01-01 PROCEDURE — 97535 SELF CARE MNGMENT TRAINING: CPT | Mod: GO

## 2022-01-01 PROCEDURE — 80048 BASIC METABOLIC PNL TOTAL CA: CPT

## 2022-01-01 PROCEDURE — 99497 ADVNCD CARE PLAN 30 MIN: CPT

## 2022-01-01 PROCEDURE — 80069 RENAL FUNCTION PANEL: CPT

## 2022-01-01 PROCEDURE — 97530 THERAPEUTIC ACTIVITIES: CPT | Mod: GP

## 2022-01-01 PROCEDURE — 92526 ORAL FUNCTION THERAPY: CPT | Mod: GN

## 2022-01-01 PROCEDURE — 93306 TTE W/DOPPLER COMPLETE: CPT

## 2022-01-01 PROCEDURE — 99497 ADVNCD CARE PLAN 30 MIN: CPT | Mod: 25

## 2022-01-01 PROCEDURE — 92523 SPEECH SOUND LANG COMPREHEN: CPT | Mod: GN

## 2022-01-01 PROCEDURE — 86901 BLOOD TYPING SEROLOGIC RH(D): CPT

## 2022-01-01 PROCEDURE — 36430 TRANSFUSION BLD/BLD COMPNT: CPT

## 2022-01-01 PROCEDURE — 84484 ASSAY OF TROPONIN QUANT: CPT

## 2022-01-01 PROCEDURE — 80053 COMPREHEN METABOLIC PANEL: CPT

## 2022-01-01 PROCEDURE — 97166 OT EVAL MOD COMPLEX 45 MIN: CPT | Mod: GO

## 2022-01-01 PROCEDURE — 36415 COLL VENOUS BLD VENIPUNCTURE: CPT

## 2022-01-01 PROCEDURE — 99232 SBSQ HOSP IP/OBS MODERATE 35: CPT

## 2022-01-01 PROCEDURE — 97116 GAIT TRAINING THERAPY: CPT | Mod: GP

## 2022-01-01 PROCEDURE — 97129 THER IVNTJ 1ST 15 MIN: CPT | Mod: GO

## 2022-01-01 PROCEDURE — 71250 CT THORAX DX C-: CPT | Mod: 26

## 2022-01-01 PROCEDURE — 70496 CT ANGIOGRAPHY HEAD: CPT | Mod: 26,MA

## 2022-01-01 PROCEDURE — 86900 BLOOD TYPING SEROLOGIC ABO: CPT

## 2022-01-01 PROCEDURE — 85610 PROTHROMBIN TIME: CPT

## 2022-01-01 PROCEDURE — 74176 CT ABD & PELVIS W/O CONTRAST: CPT | Mod: 26

## 2022-01-01 PROCEDURE — 74176 CT ABD & PELVIS W/O CONTRAST: CPT

## 2022-01-01 PROCEDURE — 87086 URINE CULTURE/COLONY COUNT: CPT

## 2022-01-01 PROCEDURE — 85027 COMPLETE CBC AUTOMATED: CPT

## 2022-01-01 PROCEDURE — 85379 FIBRIN DEGRADATION QUANT: CPT

## 2022-01-01 PROCEDURE — 76770 US EXAM ABDO BACK WALL COMP: CPT

## 2022-01-01 PROCEDURE — 76770 US EXAM ABDO BACK WALL COMP: CPT | Mod: 26

## 2022-01-01 PROCEDURE — 85014 HEMATOCRIT: CPT

## 2022-01-01 PROCEDURE — 71045 X-RAY EXAM CHEST 1 VIEW: CPT | Mod: 26

## 2022-01-01 PROCEDURE — 0042T: CPT | Mod: MA

## 2022-01-01 PROCEDURE — 70551 MRI BRAIN STEM W/O DYE: CPT

## 2022-01-01 PROCEDURE — 72125 CT NECK SPINE W/O DYE: CPT | Mod: 26,MA

## 2022-01-01 PROCEDURE — 86923 COMPATIBILITY TEST ELECTRIC: CPT

## 2022-01-01 PROCEDURE — P9016: CPT

## 2022-01-01 PROCEDURE — 97163 PT EVAL HIGH COMPLEX 45 MIN: CPT | Mod: GP

## 2022-01-01 PROCEDURE — 70450 CT HEAD/BRAIN W/O DYE: CPT | Mod: 26,MA

## 2022-01-01 PROCEDURE — 80061 LIPID PANEL: CPT

## 2022-01-01 PROCEDURE — 70498 CT ANGIOGRAPHY NECK: CPT | Mod: 26,MA

## 2022-01-01 PROCEDURE — 70551 MRI BRAIN STEM W/O DYE: CPT | Mod: 26

## 2022-01-01 PROCEDURE — 85025 COMPLETE CBC W/AUTO DIFF WBC: CPT

## 2022-01-01 PROCEDURE — 99285 EMERGENCY DEPT VISIT HI MDM: CPT | Mod: CS

## 2022-01-01 PROCEDURE — 92610 EVALUATE SWALLOWING FUNCTION: CPT | Mod: GN

## 2022-01-01 PROCEDURE — 93005 ELECTROCARDIOGRAM TRACING: CPT

## 2022-01-01 PROCEDURE — 86850 RBC ANTIBODY SCREEN: CPT

## 2022-01-01 PROCEDURE — 94640 AIRWAY INHALATION TREATMENT: CPT

## 2022-01-01 PROCEDURE — 73560 X-RAY EXAM OF KNEE 1 OR 2: CPT | Mod: 26,50

## 2022-01-01 PROCEDURE — 81001 URINALYSIS AUTO W/SCOPE: CPT

## 2022-01-01 PROCEDURE — 73560 X-RAY EXAM OF KNEE 1 OR 2: CPT | Mod: 50

## 2022-01-01 PROCEDURE — 87493 C DIFF AMPLIFIED PROBE: CPT

## 2022-01-01 PROCEDURE — 85730 THROMBOPLASTIN TIME PARTIAL: CPT

## 2022-01-01 PROCEDURE — 85018 HEMOGLOBIN: CPT

## 2022-01-01 PROCEDURE — 93306 TTE W/DOPPLER COMPLETE: CPT | Mod: 26

## 2022-01-01 RX ORDER — CHOLECALCIFEROL (VITAMIN D3) 125 MCG
1 CAPSULE ORAL
Qty: 0 | Refills: 0 | DISCHARGE

## 2022-01-01 RX ORDER — DILTIAZEM HCL 120 MG
7.5 CAPSULE, EXT RELEASE 24 HR ORAL
Qty: 125 | Refills: 0 | Status: DISCONTINUED | OUTPATIENT
Start: 2022-01-01 | End: 2022-01-01

## 2022-01-01 RX ORDER — HEPARIN SODIUM 5000 [USP'U]/ML
INJECTION INTRAVENOUS; SUBCUTANEOUS
Qty: 25000 | Refills: 0 | Status: DISCONTINUED | OUTPATIENT
Start: 2022-01-01 | End: 2022-01-01

## 2022-01-01 RX ORDER — ASPIRIN/CALCIUM CARB/MAGNESIUM 324 MG
325 TABLET ORAL ONCE
Refills: 0 | Status: DISCONTINUED | OUTPATIENT
Start: 2022-01-01 | End: 2022-01-01

## 2022-01-01 RX ORDER — CEFEPIME 1 G/1
INJECTION, POWDER, FOR SOLUTION INTRAMUSCULAR; INTRAVENOUS
Refills: 0 | Status: DISCONTINUED | OUTPATIENT
Start: 2022-01-01 | End: 2022-01-01

## 2022-01-01 RX ORDER — SALIVA SUBSTITUTE COMB NO.11 351 MG
15 POWDER IN PACKET (EA) MUCOUS MEMBRANE
Refills: 0 | Status: DISCONTINUED | OUTPATIENT
Start: 2022-01-01 | End: 2023-01-01

## 2022-01-01 RX ORDER — ATORVASTATIN CALCIUM 80 MG/1
1 TABLET, FILM COATED ORAL
Qty: 0 | Refills: 0 | DISCHARGE

## 2022-01-01 RX ORDER — ATORVASTATIN CALCIUM 80 MG/1
40 TABLET, FILM COATED ORAL AT BEDTIME
Refills: 0 | Status: DISCONTINUED | OUTPATIENT
Start: 2022-01-01 | End: 2022-01-01

## 2022-01-01 RX ORDER — IRON POLYSACCHARIDE COMPLEX 150 MG
0 CAPSULE ORAL
Qty: 0 | Refills: 0 | DISCHARGE

## 2022-01-01 RX ORDER — DILTIAZEM HCL 120 MG
10 CAPSULE, EXT RELEASE 24 HR ORAL ONCE
Refills: 0 | Status: COMPLETED | OUTPATIENT
Start: 2022-01-01 | End: 2022-01-01

## 2022-01-01 RX ORDER — CASPOFUNGIN ACETATE 7 MG/ML
70 INJECTION, POWDER, LYOPHILIZED, FOR SOLUTION INTRAVENOUS ONCE
Refills: 0 | Status: COMPLETED | OUTPATIENT
Start: 2022-01-01 | End: 2022-01-01

## 2022-01-01 RX ORDER — BUDESONIDE AND FORMOTEROL FUMARATE DIHYDRATE 160; 4.5 UG/1; UG/1
2 AEROSOL RESPIRATORY (INHALATION)
Qty: 1 | Refills: 0
Start: 2022-01-01 | End: 2023-01-20

## 2022-01-01 RX ORDER — DORZOLAMIDE HYDROCHLORIDE 20 MG/ML
0 SOLUTION/ DROPS OPHTHALMIC
Qty: 0 | Refills: 0 | DISCHARGE

## 2022-01-01 RX ORDER — METOPROLOL TARTRATE 50 MG
5 TABLET ORAL ONCE
Refills: 0 | Status: COMPLETED | OUTPATIENT
Start: 2022-01-01 | End: 2022-01-01

## 2022-01-01 RX ORDER — FUROSEMIDE 40 MG
0 TABLET ORAL
Qty: 0 | Refills: 0 | DISCHARGE

## 2022-01-01 RX ORDER — FERROUS SULFATE 325(65) MG
325 TABLET ORAL DAILY
Refills: 0 | Status: DISCONTINUED | OUTPATIENT
Start: 2022-01-01 | End: 2022-01-01

## 2022-01-01 RX ORDER — ACETAMINOPHEN 500 MG
650 TABLET ORAL EVERY 6 HOURS
Refills: 0 | Status: DISCONTINUED | OUTPATIENT
Start: 2022-01-01 | End: 2023-01-01

## 2022-01-01 RX ORDER — ACETAMINOPHEN 500 MG
650 TABLET ORAL EVERY 6 HOURS
Refills: 0 | Status: DISCONTINUED | OUTPATIENT
Start: 2022-01-01 | End: 2022-01-01

## 2022-01-01 RX ORDER — SODIUM CHLORIDE 9 MG/ML
1550 INJECTION, SOLUTION INTRAVENOUS ONCE
Refills: 0 | Status: DISCONTINUED | OUTPATIENT
Start: 2022-01-01 | End: 2022-01-01

## 2022-01-01 RX ORDER — DORZOLAMIDE HYDROCHLORIDE 20 MG/ML
0 SOLUTION/ DROPS OPHTHALMIC
Qty: 0 | Refills: 1 | DISCHARGE

## 2022-01-01 RX ORDER — SODIUM ZIRCONIUM CYCLOSILICATE 10 G/10G
10 POWDER, FOR SUSPENSION ORAL ONCE
Refills: 0 | Status: COMPLETED | OUTPATIENT
Start: 2022-01-01 | End: 2022-01-01

## 2022-01-01 RX ORDER — DIGOXIN 250 MCG
250 TABLET ORAL ONCE
Refills: 0 | Status: COMPLETED | OUTPATIENT
Start: 2022-01-01 | End: 2022-01-01

## 2022-01-01 RX ORDER — HEPARIN SODIUM 5000 [USP'U]/ML
3000 INJECTION INTRAVENOUS; SUBCUTANEOUS EVERY 6 HOURS
Refills: 0 | Status: DISCONTINUED | OUTPATIENT
Start: 2022-01-01 | End: 2022-01-01

## 2022-01-01 RX ORDER — TRAVOPROST 0.04 MG/ML
0 SOLUTION/ DROPS OPHTHALMIC
Qty: 0 | Refills: 0 | DISCHARGE

## 2022-01-01 RX ORDER — CEFEPIME 1 G/1
1000 INJECTION, POWDER, FOR SOLUTION INTRAMUSCULAR; INTRAVENOUS ONCE
Refills: 0 | Status: COMPLETED | OUTPATIENT
Start: 2022-01-01 | End: 2022-01-01

## 2022-01-01 RX ORDER — CLOPIDOGREL BISULFATE 75 MG/1
75 TABLET, FILM COATED ORAL DAILY
Refills: 0 | Status: DISCONTINUED | OUTPATIENT
Start: 2022-01-01 | End: 2022-01-01

## 2022-01-01 RX ORDER — METOPROLOL TARTRATE 50 MG
25 TABLET ORAL DAILY
Refills: 0 | Status: DISCONTINUED | OUTPATIENT
Start: 2022-01-01 | End: 2022-01-01

## 2022-01-01 RX ORDER — CASPOFUNGIN ACETATE 7 MG/ML
INJECTION, POWDER, LYOPHILIZED, FOR SOLUTION INTRAVENOUS
Refills: 0 | Status: DISCONTINUED | OUTPATIENT
Start: 2022-01-01 | End: 2023-01-01

## 2022-01-01 RX ORDER — ATORVASTATIN CALCIUM 80 MG/1
0 TABLET, FILM COATED ORAL
Qty: 0 | Refills: 0 | DISCHARGE

## 2022-01-01 RX ORDER — DEXAMETHASONE 0.5 MG/5ML
6 ELIXIR ORAL DAILY
Refills: 0 | Status: DISCONTINUED | OUTPATIENT
Start: 2022-01-01 | End: 2022-01-01

## 2022-01-01 RX ORDER — BUDESONIDE AND FORMOTEROL FUMARATE DIHYDRATE 160; 4.5 UG/1; UG/1
2 AEROSOL RESPIRATORY (INHALATION)
Refills: 0 | Status: DISCONTINUED | OUTPATIENT
Start: 2022-01-01 | End: 2023-01-01

## 2022-01-01 RX ORDER — METOPROLOL TARTRATE 50 MG
1 TABLET ORAL
Qty: 0 | Refills: 0 | DISCHARGE

## 2022-01-01 RX ORDER — ASPIRIN/CALCIUM CARB/MAGNESIUM 324 MG
300 TABLET ORAL ONCE
Refills: 0 | Status: COMPLETED | OUTPATIENT
Start: 2022-01-01 | End: 2022-01-01

## 2022-01-01 RX ORDER — FLUTICASONE PROPIONATE AND SALMETEROL 50; 250 UG/1; UG/1
0 POWDER ORAL; RESPIRATORY (INHALATION)
Qty: 0 | Refills: 0 | DISCHARGE

## 2022-01-01 RX ORDER — LATANOPROST 0.05 MG/ML
1 SOLUTION/ DROPS OPHTHALMIC; TOPICAL AT BEDTIME
Refills: 0 | Status: DISCONTINUED | OUTPATIENT
Start: 2022-01-01 | End: 2022-01-01

## 2022-01-01 RX ORDER — DIPHENHYDRAMINE HYDROCHLORIDE AND LIDOCAINE HYDROCHLORIDE AND ALUMINUM HYDROXIDE AND MAGNESIUM HYDRO
15 KIT
Refills: 0 | Status: DISCONTINUED | OUTPATIENT
Start: 2022-01-01 | End: 2023-01-01

## 2022-01-01 RX ORDER — CLOPIDOGREL BISULFATE 75 MG/1
1 TABLET, FILM COATED ORAL
Qty: 0 | Refills: 0 | DISCHARGE

## 2022-01-01 RX ORDER — VANCOMYCIN HCL 1 G
1250 VIAL (EA) INTRAVENOUS ONCE
Refills: 0 | Status: DISCONTINUED | OUTPATIENT
Start: 2022-01-01 | End: 2022-01-01

## 2022-01-01 RX ORDER — DICLOFENAC SODIUM 30 MG/G
1 GEL TOPICAL
Qty: 0 | Refills: 0 | DISCHARGE

## 2022-01-01 RX ORDER — ACETAMINOPHEN 500 MG
1 TABLET ORAL
Qty: 0 | Refills: 0 | DISCHARGE

## 2022-01-01 RX ORDER — ASPIRIN/CALCIUM CARB/MAGNESIUM 324 MG
81 TABLET ORAL DAILY
Refills: 0 | Status: DISCONTINUED | OUTPATIENT
Start: 2022-01-01 | End: 2022-01-01

## 2022-01-01 RX ORDER — POLYETHYLENE GLYCOL 3350 17 G/17G
17 POWDER, FOR SOLUTION ORAL DAILY
Refills: 0 | Status: DISCONTINUED | OUTPATIENT
Start: 2022-01-01 | End: 2022-01-01

## 2022-01-01 RX ORDER — ACETAMINOPHEN 500 MG
1000 TABLET ORAL ONCE
Refills: 0 | Status: COMPLETED | OUTPATIENT
Start: 2022-01-01 | End: 2022-01-01

## 2022-01-01 RX ORDER — ALPRAZOLAM 0.25 MG
0.25 TABLET ORAL
Refills: 0 | Status: DISCONTINUED | OUTPATIENT
Start: 2022-01-01 | End: 2022-01-01

## 2022-01-01 RX ORDER — FUROSEMIDE 40 MG
20 TABLET ORAL ONCE
Refills: 0 | Status: COMPLETED | OUTPATIENT
Start: 2022-01-01 | End: 2022-01-01

## 2022-01-01 RX ORDER — POTASSIUM CHLORIDE 20 MEQ
0 PACKET (EA) ORAL
Qty: 0 | Refills: 0 | DISCHARGE

## 2022-01-01 RX ORDER — METOPROLOL TARTRATE 50 MG
2.5 TABLET ORAL EVERY 6 HOURS
Refills: 0 | Status: DISCONTINUED | OUTPATIENT
Start: 2022-01-01 | End: 2023-01-01

## 2022-01-01 RX ORDER — METOPROLOL TARTRATE 50 MG
2 TABLET ORAL
Qty: 0 | Refills: 0 | DISCHARGE

## 2022-01-01 RX ORDER — NYSTATIN 500MM UNIT
500000 POWDER (EA) MISCELLANEOUS
Refills: 0 | Status: DISCONTINUED | OUTPATIENT
Start: 2022-01-01 | End: 2023-01-01

## 2022-01-01 RX ORDER — HEPARIN SODIUM 5000 [USP'U]/ML
5000 INJECTION INTRAVENOUS; SUBCUTANEOUS EVERY 12 HOURS
Refills: 0 | Status: DISCONTINUED | OUTPATIENT
Start: 2022-01-01 | End: 2022-01-01

## 2022-01-01 RX ORDER — CLOPIDOGREL BISULFATE 75 MG/1
0 TABLET, FILM COATED ORAL
Qty: 0 | Refills: 0 | DISCHARGE

## 2022-01-01 RX ORDER — ALBUTEROL 90 UG/1
2 AEROSOL, METERED ORAL
Qty: 0 | Refills: 0 | DISCHARGE

## 2022-01-01 RX ORDER — FLUTICASONE PROPIONATE AND SALMETEROL 50; 250 UG/1; UG/1
2 POWDER ORAL; RESPIRATORY (INHALATION)
Qty: 0 | Refills: 0 | DISCHARGE

## 2022-01-01 RX ORDER — CEFEPIME 1 G/1
1000 INJECTION, POWDER, FOR SOLUTION INTRAMUSCULAR; INTRAVENOUS EVERY 12 HOURS
Refills: 0 | Status: DISCONTINUED | OUTPATIENT
Start: 2022-01-01 | End: 2022-01-01

## 2022-01-01 RX ORDER — CASPOFUNGIN ACETATE 7 MG/ML
50 INJECTION, POWDER, LYOPHILIZED, FOR SOLUTION INTRAVENOUS EVERY 24 HOURS
Refills: 0 | Status: DISCONTINUED | OUTPATIENT
Start: 2022-01-01 | End: 2023-01-01

## 2022-01-01 RX ORDER — HEPARIN SODIUM 5000 [USP'U]/ML
6500 INJECTION INTRAVENOUS; SUBCUTANEOUS EVERY 6 HOURS
Refills: 0 | Status: DISCONTINUED | OUTPATIENT
Start: 2022-01-01 | End: 2022-01-01

## 2022-01-01 RX ORDER — OMEGA-3 ACID ETHYL ESTERS 1 G
2 CAPSULE ORAL
Qty: 0 | Refills: 0 | DISCHARGE

## 2022-01-01 RX ORDER — SODIUM CHLORIDE 9 MG/ML
1000 INJECTION, SOLUTION INTRAVENOUS
Refills: 0 | Status: COMPLETED | OUTPATIENT
Start: 2022-01-01 | End: 2022-01-01

## 2022-01-01 RX ORDER — POTASSIUM CHLORIDE 20 MEQ
1 PACKET (EA) ORAL
Qty: 0 | Refills: 0 | DISCHARGE

## 2022-01-01 RX ORDER — DORZOLAMIDE HYDROCHLORIDE 20 MG/ML
1 SOLUTION/ DROPS OPHTHALMIC THREE TIMES A DAY
Refills: 0 | Status: DISCONTINUED | OUTPATIENT
Start: 2022-01-01 | End: 2022-01-01

## 2022-01-01 RX ORDER — CEFEPIME 1 G/1
2000 INJECTION, POWDER, FOR SOLUTION INTRAMUSCULAR; INTRAVENOUS ONCE
Refills: 0 | Status: COMPLETED | OUTPATIENT
Start: 2022-01-01 | End: 2022-01-01

## 2022-01-01 RX ORDER — METOPROLOL TARTRATE 50 MG
25 TABLET ORAL THREE TIMES A DAY
Refills: 0 | Status: DISCONTINUED | OUTPATIENT
Start: 2022-01-01 | End: 2022-01-01

## 2022-01-01 RX ORDER — HYDROMORPHONE HYDROCHLORIDE 2 MG/ML
0.25 INJECTION INTRAMUSCULAR; INTRAVENOUS; SUBCUTANEOUS ONCE
Refills: 0 | Status: DISCONTINUED | OUTPATIENT
Start: 2022-01-01 | End: 2022-01-01

## 2022-01-01 RX ORDER — TRAVOPROST 0.04 MG/ML
1 SOLUTION/ DROPS OPHTHALMIC
Qty: 0 | Refills: 0 | DISCHARGE

## 2022-01-01 RX ORDER — METOPROLOL TARTRATE 50 MG
25 TABLET ORAL EVERY 12 HOURS
Refills: 0 | Status: DISCONTINUED | OUTPATIENT
Start: 2022-01-01 | End: 2022-01-01

## 2022-01-01 RX ORDER — DILTIAZEM HCL 120 MG
5 CAPSULE, EXT RELEASE 24 HR ORAL
Qty: 125 | Refills: 0 | Status: DISCONTINUED | OUTPATIENT
Start: 2022-01-01 | End: 2022-01-01

## 2022-01-01 RX ORDER — ASPIRIN/CALCIUM CARB/MAGNESIUM 324 MG
300 TABLET ORAL DAILY
Refills: 0 | Status: DISCONTINUED | OUTPATIENT
Start: 2022-01-01 | End: 2022-01-01

## 2022-01-01 RX ORDER — CHOLECALCIFEROL (VITAMIN D3) 125 MCG
1000 CAPSULE ORAL DAILY
Refills: 0 | Status: DISCONTINUED | OUTPATIENT
Start: 2022-01-01 | End: 2022-01-01

## 2022-01-01 RX ORDER — SODIUM CHLORIDE 9 MG/ML
500 INJECTION INTRAMUSCULAR; INTRAVENOUS; SUBCUTANEOUS ONCE
Refills: 0 | Status: COMPLETED | OUTPATIENT
Start: 2022-01-01 | End: 2022-01-01

## 2022-01-01 RX ORDER — DILTIAZEM HCL 120 MG
15 CAPSULE, EXT RELEASE 24 HR ORAL
Qty: 125 | Refills: 0 | Status: DISCONTINUED | OUTPATIENT
Start: 2022-01-01 | End: 2022-01-01

## 2022-01-01 RX ORDER — DIGOXIN 250 MCG
125 TABLET ORAL ONCE
Refills: 0 | Status: DISCONTINUED | OUTPATIENT
Start: 2022-01-01 | End: 2022-01-01

## 2022-01-01 RX ORDER — FERROUS SULFATE 325(65) MG
1 TABLET ORAL
Qty: 0 | Refills: 0 | DISCHARGE

## 2022-01-01 RX ORDER — HYDROMORPHONE HYDROCHLORIDE 2 MG/ML
0.25 INJECTION INTRAMUSCULAR; INTRAVENOUS; SUBCUTANEOUS
Refills: 0 | Status: DISCONTINUED | OUTPATIENT
Start: 2022-01-01 | End: 2023-01-01

## 2022-01-01 RX ORDER — SODIUM CHLORIDE 9 MG/ML
1000 INJECTION, SOLUTION INTRAVENOUS
Refills: 0 | Status: DISCONTINUED | OUTPATIENT
Start: 2022-01-01 | End: 2023-01-01

## 2022-01-01 RX ORDER — FLUTICASONE PROPIONATE AND SALMETEROL 50; 250 UG/1; UG/1
1 POWDER ORAL; RESPIRATORY (INHALATION)
Qty: 0 | Refills: 0 | DISCHARGE

## 2022-01-01 RX ORDER — BUDESONIDE AND FORMOTEROL FUMARATE DIHYDRATE 160; 4.5 UG/1; UG/1
2 AEROSOL RESPIRATORY (INHALATION)
Refills: 0 | Status: DISCONTINUED | OUTPATIENT
Start: 2022-01-01 | End: 2022-01-01

## 2022-01-01 RX ORDER — HEPARIN SODIUM 5000 [USP'U]/ML
6500 INJECTION INTRAVENOUS; SUBCUTANEOUS ONCE
Refills: 0 | Status: DISCONTINUED | OUTPATIENT
Start: 2022-01-01 | End: 2022-01-01

## 2022-01-01 RX ORDER — ALBUTEROL 90 UG/1
2 AEROSOL, METERED ORAL EVERY 6 HOURS
Refills: 0 | Status: DISCONTINUED | OUTPATIENT
Start: 2022-01-01 | End: 2022-01-01

## 2022-01-01 RX ORDER — SODIUM CHLORIDE 9 MG/ML
1000 INJECTION INTRAMUSCULAR; INTRAVENOUS; SUBCUTANEOUS
Refills: 0 | Status: DISCONTINUED | OUTPATIENT
Start: 2022-01-01 | End: 2022-01-01

## 2022-01-01 RX ORDER — CLOPIDOGREL BISULFATE 75 MG/1
75 TABLET, FILM COATED ORAL ONCE
Refills: 0 | Status: COMPLETED | OUTPATIENT
Start: 2022-01-01 | End: 2022-01-01

## 2022-01-01 RX ORDER — SODIUM CHLORIDE 9 MG/ML
1000 INJECTION INTRAMUSCULAR; INTRAVENOUS; SUBCUTANEOUS ONCE
Refills: 0 | Status: COMPLETED | OUTPATIENT
Start: 2022-01-01 | End: 2022-01-01

## 2022-01-01 RX ORDER — IRON POLYSACCHARIDE COMPLEX 150 MG
1 CAPSULE ORAL
Qty: 0 | Refills: 0 | DISCHARGE

## 2022-01-01 RX ORDER — METOPROLOL TARTRATE 50 MG
0 TABLET ORAL
Qty: 0 | Refills: 0 | DISCHARGE

## 2022-01-01 RX ORDER — CEFEPIME 1 G/1
1000 INJECTION, POWDER, FOR SOLUTION INTRAMUSCULAR; INTRAVENOUS EVERY 24 HOURS
Refills: 0 | Status: DISCONTINUED | OUTPATIENT
Start: 2022-01-01 | End: 2022-01-01

## 2022-01-01 RX ADMIN — BUDESONIDE AND FORMOTEROL FUMARATE DIHYDRATE 2 PUFF(S): 160; 4.5 AEROSOL RESPIRATORY (INHALATION) at 08:37

## 2022-01-01 RX ADMIN — Medication 10 MILLIGRAM(S): at 17:25

## 2022-01-01 RX ADMIN — Medication 325 MILLIGRAM(S): at 09:20

## 2022-01-01 RX ADMIN — DORZOLAMIDE HYDROCHLORIDE 1 DROP(S): 20 SOLUTION/ DROPS OPHTHALMIC at 22:56

## 2022-01-01 RX ADMIN — Medication 5 MG/HR: at 04:16

## 2022-01-01 RX ADMIN — POLYETHYLENE GLYCOL 3350 17 GRAM(S): 17 POWDER, FOR SOLUTION ORAL at 10:55

## 2022-01-01 RX ADMIN — BUDESONIDE AND FORMOTEROL FUMARATE DIHYDRATE 2 PUFF(S): 160; 4.5 AEROSOL RESPIRATORY (INHALATION) at 20:14

## 2022-01-01 RX ADMIN — LATANOPROST 1 DROP(S): 0.05 SOLUTION/ DROPS OPHTHALMIC; TOPICAL at 22:57

## 2022-01-01 RX ADMIN — HEPARIN SODIUM 1200 UNIT(S)/HR: 5000 INJECTION INTRAVENOUS; SUBCUTANEOUS at 04:10

## 2022-01-01 RX ADMIN — Medication 5 MILLIGRAM(S): at 06:15

## 2022-01-01 RX ADMIN — HYDROMORPHONE HYDROCHLORIDE 0.25 MILLIGRAM(S): 2 INJECTION INTRAMUSCULAR; INTRAVENOUS; SUBCUTANEOUS at 18:58

## 2022-01-01 RX ADMIN — BUDESONIDE AND FORMOTEROL FUMARATE DIHYDRATE 2 PUFF(S): 160; 4.5 AEROSOL RESPIRATORY (INHALATION) at 08:43

## 2022-01-01 RX ADMIN — Medication 25 MILLIGRAM(S): at 23:24

## 2022-01-01 RX ADMIN — Medication 1 APPLICATION(S): at 10:52

## 2022-01-01 RX ADMIN — DORZOLAMIDE HYDROCHLORIDE 1 DROP(S): 20 SOLUTION/ DROPS OPHTHALMIC at 23:14

## 2022-01-01 RX ADMIN — CASPOFUNGIN ACETATE 260 MILLIGRAM(S): 7 INJECTION, POWDER, LYOPHILIZED, FOR SOLUTION INTRAVENOUS at 17:45

## 2022-01-01 RX ADMIN — BUDESONIDE AND FORMOTEROL FUMARATE DIHYDRATE 2 PUFF(S): 160; 4.5 AEROSOL RESPIRATORY (INHALATION) at 21:10

## 2022-01-01 RX ADMIN — HYDROMORPHONE HYDROCHLORIDE 0.25 MILLIGRAM(S): 2 INJECTION INTRAMUSCULAR; INTRAVENOUS; SUBCUTANEOUS at 22:15

## 2022-01-01 RX ADMIN — Medication 1000 UNIT(S): at 10:12

## 2022-01-01 RX ADMIN — Medication 25 MILLIGRAM(S): at 05:39

## 2022-01-01 RX ADMIN — BUDESONIDE AND FORMOTEROL FUMARATE DIHYDRATE 2 PUFF(S): 160; 4.5 AEROSOL RESPIRATORY (INHALATION) at 20:10

## 2022-01-01 RX ADMIN — HYDROMORPHONE HYDROCHLORIDE 0.25 MILLIGRAM(S): 2 INJECTION INTRAMUSCULAR; INTRAVENOUS; SUBCUTANEOUS at 17:30

## 2022-01-01 RX ADMIN — Medication 25 MILLIGRAM(S): at 05:19

## 2022-01-01 RX ADMIN — HEPARIN SODIUM 1200 UNIT(S)/HR: 5000 INJECTION INTRAVENOUS; SUBCUTANEOUS at 19:04

## 2022-01-01 RX ADMIN — HEPARIN SODIUM 1200 UNIT(S)/HR: 5000 INJECTION INTRAVENOUS; SUBCUTANEOUS at 11:01

## 2022-01-01 RX ADMIN — Medication 5 MG/HR: at 06:16

## 2022-01-01 RX ADMIN — CEFEPIME 1000 MILLIGRAM(S): 1 INJECTION, POWDER, FOR SOLUTION INTRAMUSCULAR; INTRAVENOUS at 18:50

## 2022-01-01 RX ADMIN — Medication 0.5 MILLIGRAM(S): at 06:40

## 2022-01-01 RX ADMIN — ATORVASTATIN CALCIUM 40 MILLIGRAM(S): 80 TABLET, FILM COATED ORAL at 21:28

## 2022-01-01 RX ADMIN — HEPARIN SODIUM 1400 UNIT(S)/HR: 5000 INJECTION INTRAVENOUS; SUBCUTANEOUS at 20:07

## 2022-01-01 RX ADMIN — Medication 0.25 MILLIGRAM(S): at 08:16

## 2022-01-01 RX ADMIN — DORZOLAMIDE HYDROCHLORIDE 1 DROP(S): 20 SOLUTION/ DROPS OPHTHALMIC at 06:10

## 2022-01-01 RX ADMIN — CLOPIDOGREL BISULFATE 75 MILLIGRAM(S): 75 TABLET, FILM COATED ORAL at 10:53

## 2022-01-01 RX ADMIN — DORZOLAMIDE HYDROCHLORIDE 1 DROP(S): 20 SOLUTION/ DROPS OPHTHALMIC at 00:18

## 2022-01-01 RX ADMIN — BUDESONIDE AND FORMOTEROL FUMARATE DIHYDRATE 2 PUFF(S): 160; 4.5 AEROSOL RESPIRATORY (INHALATION) at 20:04

## 2022-01-01 RX ADMIN — Medication 5 MILLIGRAM(S): at 06:40

## 2022-01-01 RX ADMIN — Medication 20 MILLIGRAM(S): at 17:09

## 2022-01-01 RX ADMIN — ATORVASTATIN CALCIUM 40 MILLIGRAM(S): 80 TABLET, FILM COATED ORAL at 23:24

## 2022-01-01 RX ADMIN — Medication 25 MILLIGRAM(S): at 13:26

## 2022-01-01 RX ADMIN — HEPARIN SODIUM 700 UNIT(S)/HR: 5000 INJECTION INTRAVENOUS; SUBCUTANEOUS at 11:22

## 2022-01-01 RX ADMIN — POLYETHYLENE GLYCOL 3350 17 GRAM(S): 17 POWDER, FOR SOLUTION ORAL at 09:20

## 2022-01-01 RX ADMIN — Medication 2.5 MILLIGRAM(S): at 13:09

## 2022-01-01 RX ADMIN — LATANOPROST 1 DROP(S): 0.05 SOLUTION/ DROPS OPHTHALMIC; TOPICAL at 23:08

## 2022-01-01 RX ADMIN — Medication 6 MILLIGRAM(S): at 09:20

## 2022-01-01 RX ADMIN — Medication 2.5 MILLIGRAM(S): at 18:59

## 2022-01-01 RX ADMIN — LATANOPROST 1 DROP(S): 0.05 SOLUTION/ DROPS OPHTHALMIC; TOPICAL at 22:06

## 2022-01-01 RX ADMIN — Medication 325 MILLIGRAM(S): at 10:12

## 2022-01-01 RX ADMIN — Medication 81 MILLIGRAM(S): at 09:20

## 2022-01-01 RX ADMIN — Medication 0.25 MILLIGRAM(S): at 14:05

## 2022-01-01 RX ADMIN — SODIUM CHLORIDE 500 MILLILITER(S): 9 INJECTION, SOLUTION INTRAVENOUS at 11:42

## 2022-01-01 RX ADMIN — DORZOLAMIDE HYDROCHLORIDE 1 DROP(S): 20 SOLUTION/ DROPS OPHTHALMIC at 14:08

## 2022-01-01 RX ADMIN — SODIUM CHLORIDE 60 MILLILITER(S): 9 INJECTION, SOLUTION INTRAVENOUS at 11:42

## 2022-01-01 RX ADMIN — BUDESONIDE AND FORMOTEROL FUMARATE DIHYDRATE 2 PUFF(S): 160; 4.5 AEROSOL RESPIRATORY (INHALATION) at 21:26

## 2022-01-01 RX ADMIN — Medication 250 MICROGRAM(S): at 19:00

## 2022-01-01 RX ADMIN — DORZOLAMIDE HYDROCHLORIDE 1 DROP(S): 20 SOLUTION/ DROPS OPHTHALMIC at 05:09

## 2022-01-01 RX ADMIN — Medication 325 MILLIGRAM(S): at 10:56

## 2022-01-01 RX ADMIN — CEFEPIME 1000 MILLIGRAM(S): 1 INJECTION, POWDER, FOR SOLUTION INTRAMUSCULAR; INTRAVENOUS at 06:10

## 2022-01-01 RX ADMIN — Medication 0.25 MILLIGRAM(S): at 13:00

## 2022-01-01 RX ADMIN — HEPARIN SODIUM 5000 UNIT(S): 5000 INJECTION INTRAVENOUS; SUBCUTANEOUS at 23:14

## 2022-01-01 RX ADMIN — CASPOFUNGIN ACETATE 260 MILLIGRAM(S): 7 INJECTION, POWDER, LYOPHILIZED, FOR SOLUTION INTRAVENOUS at 18:59

## 2022-01-01 RX ADMIN — SODIUM CHLORIDE 75 MILLILITER(S): 9 INJECTION INTRAMUSCULAR; INTRAVENOUS; SUBCUTANEOUS at 13:20

## 2022-01-01 RX ADMIN — Medication 10 MILLIGRAM(S): at 12:01

## 2022-01-01 RX ADMIN — Medication 650 MILLIGRAM(S): at 16:39

## 2022-01-01 RX ADMIN — BUDESONIDE AND FORMOTEROL FUMARATE DIHYDRATE 2 PUFF(S): 160; 4.5 AEROSOL RESPIRATORY (INHALATION) at 08:27

## 2022-01-01 RX ADMIN — HEPARIN SODIUM 1400 UNIT(S)/HR: 5000 INJECTION INTRAVENOUS; SUBCUTANEOUS at 19:21

## 2022-01-01 RX ADMIN — SODIUM CHLORIDE 1000 MILLILITER(S): 9 INJECTION INTRAMUSCULAR; INTRAVENOUS; SUBCUTANEOUS at 17:18

## 2022-01-01 RX ADMIN — DORZOLAMIDE HYDROCHLORIDE 1 DROP(S): 20 SOLUTION/ DROPS OPHTHALMIC at 05:42

## 2022-01-01 RX ADMIN — Medication 5 MG/HR: at 12:32

## 2022-01-01 RX ADMIN — HEPARIN SODIUM 5000 UNIT(S): 5000 INJECTION INTRAVENOUS; SUBCUTANEOUS at 10:53

## 2022-01-01 RX ADMIN — Medication 1000 UNIT(S): at 10:52

## 2022-01-01 RX ADMIN — Medication 1000 UNIT(S): at 11:25

## 2022-01-01 RX ADMIN — BUDESONIDE AND FORMOTEROL FUMARATE DIHYDRATE 2 PUFF(S): 160; 4.5 AEROSOL RESPIRATORY (INHALATION) at 21:02

## 2022-01-01 RX ADMIN — LATANOPROST 1 DROP(S): 0.05 SOLUTION/ DROPS OPHTHALMIC; TOPICAL at 23:25

## 2022-01-01 RX ADMIN — Medication 0.25 MILLIGRAM(S): at 10:25

## 2022-01-01 RX ADMIN — DORZOLAMIDE HYDROCHLORIDE 1 DROP(S): 20 SOLUTION/ DROPS OPHTHALMIC at 13:38

## 2022-01-01 RX ADMIN — Medication 5 MG/HR: at 21:53

## 2022-01-01 RX ADMIN — Medication 1 MILLIGRAM(S): at 05:09

## 2022-01-01 RX ADMIN — Medication 81 MILLIGRAM(S): at 11:25

## 2022-01-01 RX ADMIN — POLYETHYLENE GLYCOL 3350 17 GRAM(S): 17 POWDER, FOR SOLUTION ORAL at 11:26

## 2022-01-01 RX ADMIN — LATANOPROST 1 DROP(S): 0.05 SOLUTION/ DROPS OPHTHALMIC; TOPICAL at 21:32

## 2022-01-01 RX ADMIN — Medication 7.5 MG/HR: at 14:40

## 2022-01-01 RX ADMIN — CEFEPIME 1000 MILLIGRAM(S): 1 INJECTION, POWDER, FOR SOLUTION INTRAMUSCULAR; INTRAVENOUS at 05:30

## 2022-01-01 RX ADMIN — HEPARIN SODIUM 1000 UNIT(S)/HR: 5000 INJECTION INTRAVENOUS; SUBCUTANEOUS at 02:20

## 2022-01-01 RX ADMIN — Medication 1000 UNIT(S): at 13:08

## 2022-01-01 RX ADMIN — Medication 81 MILLIGRAM(S): at 10:56

## 2022-01-01 RX ADMIN — Medication 25 MILLIGRAM(S): at 10:54

## 2022-01-01 RX ADMIN — DORZOLAMIDE HYDROCHLORIDE 1 DROP(S): 20 SOLUTION/ DROPS OPHTHALMIC at 05:31

## 2022-01-01 RX ADMIN — SODIUM ZIRCONIUM CYCLOSILICATE 10 GRAM(S): 10 POWDER, FOR SUSPENSION ORAL at 13:26

## 2022-01-01 RX ADMIN — CEFEPIME 1000 MILLIGRAM(S): 1 INJECTION, POWDER, FOR SOLUTION INTRAMUSCULAR; INTRAVENOUS at 05:41

## 2022-01-01 RX ADMIN — ATORVASTATIN CALCIUM 40 MILLIGRAM(S): 80 TABLET, FILM COATED ORAL at 22:56

## 2022-01-01 RX ADMIN — Medication 300 MILLIGRAM(S): at 12:17

## 2022-01-01 RX ADMIN — Medication 6 MILLIGRAM(S): at 13:17

## 2022-01-01 RX ADMIN — DORZOLAMIDE HYDROCHLORIDE 1 DROP(S): 20 SOLUTION/ DROPS OPHTHALMIC at 23:25

## 2022-01-01 RX ADMIN — LATANOPROST 1 DROP(S): 0.05 SOLUTION/ DROPS OPHTHALMIC; TOPICAL at 00:18

## 2022-01-01 RX ADMIN — CEFEPIME 1000 MILLIGRAM(S): 1 INJECTION, POWDER, FOR SOLUTION INTRAMUSCULAR; INTRAVENOUS at 05:38

## 2022-01-01 RX ADMIN — BUDESONIDE AND FORMOTEROL FUMARATE DIHYDRATE 2 PUFF(S): 160; 4.5 AEROSOL RESPIRATORY (INHALATION) at 22:15

## 2022-01-01 RX ADMIN — Medication 1000 UNIT(S): at 09:20

## 2022-01-01 RX ADMIN — Medication 6 MILLIGRAM(S): at 11:26

## 2022-01-01 RX ADMIN — DORZOLAMIDE HYDROCHLORIDE 1 DROP(S): 20 SOLUTION/ DROPS OPHTHALMIC at 05:39

## 2022-01-01 RX ADMIN — Medication 400 MILLIGRAM(S): at 17:18

## 2022-01-01 RX ADMIN — DORZOLAMIDE HYDROCHLORIDE 1 DROP(S): 20 SOLUTION/ DROPS OPHTHALMIC at 05:21

## 2022-01-01 RX ADMIN — Medication 6 MILLIGRAM(S): at 10:55

## 2022-01-01 RX ADMIN — DORZOLAMIDE HYDROCHLORIDE 1 DROP(S): 20 SOLUTION/ DROPS OPHTHALMIC at 23:01

## 2022-01-01 RX ADMIN — DORZOLAMIDE HYDROCHLORIDE 1 DROP(S): 20 SOLUTION/ DROPS OPHTHALMIC at 21:31

## 2022-01-01 RX ADMIN — Medication 25 MILLIGRAM(S): at 23:00

## 2022-01-01 RX ADMIN — BUDESONIDE AND FORMOTEROL FUMARATE DIHYDRATE 2 PUFF(S): 160; 4.5 AEROSOL RESPIRATORY (INHALATION) at 08:19

## 2022-01-01 RX ADMIN — Medication 5 MG/HR: at 19:56

## 2022-01-01 RX ADMIN — Medication 325 MILLIGRAM(S): at 11:26

## 2022-01-01 RX ADMIN — ATORVASTATIN CALCIUM 40 MILLIGRAM(S): 80 TABLET, FILM COATED ORAL at 23:00

## 2022-01-01 RX ADMIN — DORZOLAMIDE HYDROCHLORIDE 1 DROP(S): 20 SOLUTION/ DROPS OPHTHALMIC at 13:28

## 2022-01-01 RX ADMIN — DORZOLAMIDE HYDROCHLORIDE 1 DROP(S): 20 SOLUTION/ DROPS OPHTHALMIC at 13:08

## 2022-01-01 RX ADMIN — Medication 300 MILLIGRAM(S): at 15:25

## 2022-01-01 RX ADMIN — Medication 1 TABLET(S): at 10:52

## 2022-01-01 RX ADMIN — HEPARIN SODIUM 1000 UNIT(S)/HR: 5000 INJECTION INTRAVENOUS; SUBCUTANEOUS at 07:47

## 2022-01-01 RX ADMIN — Medication 81 MILLIGRAM(S): at 13:08

## 2022-01-01 RX ADMIN — Medication 5 MG/HR: at 06:07

## 2022-01-01 RX ADMIN — Medication 10 MILLIGRAM(S): at 10:54

## 2022-01-01 RX ADMIN — HEPARIN SODIUM 1400 UNIT(S)/HR: 5000 INJECTION INTRAVENOUS; SUBCUTANEOUS at 13:17

## 2022-01-01 RX ADMIN — CEFEPIME 1000 MILLIGRAM(S): 1 INJECTION, POWDER, FOR SOLUTION INTRAMUSCULAR; INTRAVENOUS at 05:19

## 2022-01-01 RX ADMIN — SODIUM CHLORIDE 1000 MILLILITER(S): 9 INJECTION INTRAMUSCULAR; INTRAVENOUS; SUBCUTANEOUS at 14:27

## 2022-01-01 RX ADMIN — Medication 1000 UNIT(S): at 10:56

## 2022-01-01 RX ADMIN — BUDESONIDE AND FORMOTEROL FUMARATE DIHYDRATE 2 PUFF(S): 160; 4.5 AEROSOL RESPIRATORY (INHALATION) at 08:16

## 2022-01-01 RX ADMIN — Medication 5 MILLIGRAM(S): at 12:32

## 2022-01-01 RX ADMIN — CEFEPIME 1000 MILLIGRAM(S): 1 INJECTION, POWDER, FOR SOLUTION INTRAMUSCULAR; INTRAVENOUS at 05:21

## 2022-01-01 RX ADMIN — ATORVASTATIN CALCIUM 40 MILLIGRAM(S): 80 TABLET, FILM COATED ORAL at 21:39

## 2022-01-01 RX ADMIN — HEPARIN SODIUM 1400 UNIT(S)/HR: 5000 INJECTION INTRAVENOUS; SUBCUTANEOUS at 12:01

## 2022-01-01 RX ADMIN — Medication 81 MILLIGRAM(S): at 10:12

## 2022-01-01 RX ADMIN — Medication 10 MILLIGRAM(S): at 14:33

## 2022-01-01 RX ADMIN — DORZOLAMIDE HYDROCHLORIDE 1 DROP(S): 20 SOLUTION/ DROPS OPHTHALMIC at 05:19

## 2022-01-01 RX ADMIN — Medication 5 MG/HR: at 11:02

## 2022-01-01 RX ADMIN — CEFEPIME 100 MILLIGRAM(S): 1 INJECTION, POWDER, FOR SOLUTION INTRAMUSCULAR; INTRAVENOUS at 17:18

## 2022-01-01 RX ADMIN — HEPARIN SODIUM 0 UNIT(S)/HR: 5000 INJECTION INTRAVENOUS; SUBCUTANEOUS at 09:40

## 2022-12-21 NOTE — ED PROVIDER NOTE - CARE PLAN
1 Principal Discharge DX:	2019 novel coronavirus disease (COVID-19)  Secondary Diagnosis:	Generalized weakness

## 2022-12-21 NOTE — ED PROVIDER NOTE - PHYSICAL EXAMINATION
Constitutional: Awake, Alert, non-toxic. No acute distress. Well appearing, well nourished.   HEAD: Normocephalic, atraumatic. No hematomas, contusions, lacerations, or signs of trauma seen on head/face/scalp.   EYES: PERRL, EOM intact, conjunctiva and sclera are clear bilaterally.  ENT: External ears normal. No rhinorrhea, no tracheal deviation   NECK: Supple, non-tender  CARDIOVASCULAR: regular rate and rhythm.  RESPIRATORY: Normal respiratory effort; breath sounds CTAB, no wheezes, rhonchi, or rales. Speaking in full sentences. No accessory muscle use.   ABDOMEN: Soft; non-tender, non-distended. No rebound or guarding.   EXTREMITIES:  no lower extremity edema, no deformities. Knee braces b/l.   SKIN: Warm, dry  NEURO: A&O x3. Sensory and motor functions are grossly intact. Speech is normal. Very mild right lower facial droop. 5/5 strength in bilateral upper and lower extremities. Sensation in tact to light touch in bilateral upper and lower extremities.   PSYCH: Appearance and judgement seem appropriate for gender and age.

## 2022-12-21 NOTE — ED PROVIDER NOTE - SECONDARY DIAGNOSIS.
Assessment:   69 Farr shared his cell contact [phone] Generalized weakness

## 2022-12-21 NOTE — ED ADULT NURSE REASSESSMENT NOTE - NS ED NURSE REASSESS COMMENT FT1
Attempted to ambulate pt with a walker, pt was able to stand up with minimal assistance but states "I can't move my legs forward, I feel too weak". Pt assisted back into stretcher. Dr. De Santiago informed of pt unable to walk at this time. Spoke with pt to advise of MD recommendations.    Verbalized understanding.

## 2022-12-21 NOTE — PATIENT PROFILE ADULT - FALL HARM RISK - HARM RISK INTERVENTIONS
Assistance with ambulation/Assistance OOB with selected safe patient handling equipment/Communicate Risk of Fall with Harm to all staff/Reinforce activity limits and safety measures with patient and family/Review medications for side effects contributing to fall risk/Sit up slowly, dangle for a short time, stand at bedside before walking/Tailored Fall Risk Interventions/Toileting schedule using arm’s reach rule for commode and bathroom/Visual Cue: Yellow wristband and red socks/Bed in lowest position, wheels locked, appropriate side rails in place/Call bell, personal items and telephone in reach/Instruct patient to call for assistance before getting out of bed or chair/Non-slip footwear when patient is out of bed/[place] to call system/Physically safe environment - no spills, clutter or unnecessary equipment/Purposeful Proactive Rounding/Room/bathroom lighting operational, light cord in reach

## 2022-12-21 NOTE — ED ADULT NURSE NOTE - CHIEF COMPLAINT QUOTE
BIBEMS for "abnormal behavior" x2 days. family told EMS that pt appears weaker and less peppy than normal. pt was trying to rise out of the chair and slipped down. family has not seen her x2 days and reports she has not taken her medications since. no head strike or LOC as per EMS. pt is anox2 in triage, oriented to person/place. appreciates no c/o pain but endorsing dizziness. hx CVA, on Clopidogrel. right sided facial droop noted, unsure if it is baseline. MD Reyna aware, no code stroke activated. BGL in triage 141.

## 2022-12-21 NOTE — ED ADULT NURSE NOTE - OBJECTIVE STATEMENT
pt presents to ED for "abnormal behavior" for 2 days as per son. Pt here is A+Ox2, unable to recall date, pt states that she was trying to get out of a chair and slipped to the floor. Pt's son told EMS no head strike. Pt on blood thinners. Pt has b/l knee braces which patient states "it is because I have cancer, my son helps out with my legs".

## 2022-12-21 NOTE — ED PROVIDER NOTE - OBJECTIVE STATEMENT
89 y/o female with a PMHx of CAD, COPD, CVA presents to the ED BIBA c/o generalized weakness. Pt reports she had difficulty getting up from a chair due to weakness. Pt reportedly fell. No head trauma. Denies pain, n/v, CP, SOB, pain with urination. No other complaints at this time.

## 2022-12-21 NOTE — ED PROVIDER NOTE - PROGRESS NOTE DETAILS
Discussed with admitting hospitalist, will plan to admit for generalized weakness in the setting of COVID.  Patient failed ambulatory trial here due to feeling weak.  Des De Santiago MD.

## 2022-12-21 NOTE — ED PROVIDER NOTE - CLINICAL SUMMARY MEDICAL DECISION MAKING FREE TEXT BOX
89 y/o female with a PMHx of CAD, COPD, CVA BIBA from home for generalized weakness, otherwise with no other complaints. Pt does endorse chronic polyuria. Possibly UTI causing weakness. Will also check for PNA vs other sources of infection. Lower suspicion for trauma given no evidence of traumatic injury however with reported fall, will CT head. Will treat as sepsis and reassess.

## 2022-12-22 NOTE — H&P ADULT - NSICDXPASTMEDICALHX_GEN_ALL_CORE_FT
PAST MEDICAL HISTORY:  Anterolisthesis     Basal cell carcinoma     CAD (coronary artery disease)     COPD without exacerbation     CVA (cerebrovascular accident)     Degeneration, intervertebral disc, cervical     HLD (hyperlipidemia)     HTN (hypertension)

## 2022-12-22 NOTE — PHYSICAL THERAPY INITIAL EVALUATION ADULT - GENERAL OBSERVATIONS, REHAB EVAL
Pt. received supine in bed in isolation room sec to Covid+ agreeable to PT. Pt. had B/L knee brace that stated always wear them to assist with walk. Bed mob with Mod A, sit to stand to RW and amb 3 steps with Mod A.

## 2022-12-22 NOTE — DISCHARGE NOTE NURSING/CASE MANAGEMENT/SOCIAL WORK - NSDCPEFALRISK_GEN_ALL_CORE
For information on Fall & Injury Prevention, visit: [email or web address] OR  [email or web address] OR  https://www.cdc.gov/steadi/patient.html

## 2022-12-22 NOTE — DISCHARGE NOTE PROVIDER - NSDCMRMEDTOKEN_GEN_ALL_CORE_FT
Advair HFA 45 mcg-21 mcg/inh inhalation aerosol: 2 puff(s) inhaled 2 times a day  ATORVASTATIN 40 MG TABLET: 1 each orally once a day  budesonide-formoterol 80 mcg-4.5 mcg/inh inhalation aerosol: 2 inhaler(s) inhaled 2 times a day   CLOPIDOGREL 75 MG TABLET: take 1 tablet by mouth once daily  DORZOLAMIDE HCL 2% EYE DROPS: instill 1 drop into both eyes twice a day  ENALAPRIL 10MG TAB: 1 tab(s) orally once a day  Fish Oil 1000 mg oral capsule: 2 cap(s) orally 2 times a day  FUROSEMIDE 20 MG TABLET: take 1 tablet by mouth once daily  IFEREX 150 CAPSULE: take 1 capsule by mouth once daily  Metoprolol Tartrate 25 mg oral tablet: 1 tab(s) orally once a day (in the evening)  Multiple Vitamins oral tablet: 1 tab(s) orally once a day  polyethylene glycol 3350 oral powder for reconstitution: 17 gram(s) orally once a day  POT CHLORIDE 10MEQ ER TAB: 1 tab(s) orally once a day  Silvadene 1% topical cream: Apply topically to affected area 2 times a day [Left leg]  SSD 1% CRE: Apply topically to affected area once a day  travoprost 0.004% ophthalmic solution: 1 drop(s) to each affected eye once a day (in the evening)  Tylenol 8 HR Arthritis Pain 650 mg oral tablet, extended release: 1 tab(s) orally once a day (at bedtime)  Vitamin D3 25 mcg (1000 intl units) oral tablet: 1 tab(s) orally once a day

## 2022-12-22 NOTE — DISCHARGE NOTE PROVIDER - CARE PROVIDER_API CALL
Valdemar Snell  FAMILY MEDICINE  [address] [place] [address], [place] [address]  Phone: [phone]  Fax: [phone]  Follow Up Time:

## 2022-12-22 NOTE — H&P ADULT - HISTORY OF PRESENT ILLNESS
89 y/o female with a PMHx of CAD, COPD, CVA presents to the  ED on 12/21/22 BIBA c/o generalized weakness. Pt reports she had difficulty getting up from a chair due to weakness. Pt reportedly fell. No head trauma. Denies pain, n/v, CP, SOB, pain with urination. No other complaints at this time.    In the ED: afebrile, elevated /95, HR and RR wnl satting 92% on RA. WBC 12.19. Glc 147. Lactate wnl. Trop neg. UA with trace of LE. COVID +. CT head/cervical spine with acute pathology. CXR with no gross infiltrate.  89 y/o female with a PMHx of CAD, COPD, CVA presents to the  ED on 12/21/22 BIBA c/o generalized weakness x 1day. Pt reports she had difficulty getting up from a chair due to weakness, causing her to slid down onto a table. Denies falling to the floor and trauma to head. Denies  n, n/v, CP, SOB, pain with urination. No other complaints at this time.    In the ED: afebrile, elevated /95, HR and RR wnl satting 92% on RA. WBC 12.19. Glc 147. Lactate wnl. Trop neg. UA with trace of LE. COVID +. CT head/cervical spine with acute pathology. CXR with no gross infiltrate.      89 y/o female with a PMHx of CAD, COPD, CVA, HLD, HTN, and basal cell carcinoma, anterolisthesis, degeneration of cervical intervertebral disc presents to the  ED on 12/21/22 BIBA c/o generalized weakness x 1day. Pt reports she had difficulty getting up from a chair due to weakness in the legs, causing her to slid down onto a table. Denies falling to the floor and trauma to head. Denies HA, CP, SOB, vision changes, n/v, abd pain, and pain with urination. No other complaints at this time.    In the ED: afebrile, elevated /95, HR and RR wnl satting 92% on RA. WBC 12.19. Glc 147. Lactate wnl. Trop neg. UA with trace of LE. COVID +. CT head/cervical spine with acute pathology. CXR with no gross infiltrate.      89 y/o female with a PMHx of CAD, COPD, CVA, HLD, HTN, and basal cell carcinoma, anterolisthesis, degeneration of cervical intervertebral disc presents to the  ED on 12/21/22 BIBA c/o generalized weakness x 1day. Pt reports she had difficulty getting up from a chair due to weakness in the legs, causing her to slid down onto a table. Denies falling to the floor and trauma to head. Denies HA, CP, SOB, vision changes, n/v, abd pain, and pain with urination. No other complaints at this time.    In the ED: mild grade fever (100.9 s/p tylenol), elevated /95, HR and RR wnl satting 92% on RA. WBC 12.19. Glc 147. Lactate wnl. Trop neg. UA with trace of LE. COVID +. CT head/cervical spine with acute pathology. CXR with no gross infiltrate.      89 y/o female with a PMHx of CAD, COPD, CVA, HLD, HTN, and basal cell carcinoma, anterolisthesis, degeneration of cervical intervertebral disc presents to the  ED on 12/21/22 BIBA c/o generalized weakness x 1day. Pt reports she had difficulty getting up from a chair due to weakness in the legs, causing her to slid down onto a table. Denies falling to the floor and trauma to head. Denies HA, CP, SOB, vision changes, n/v, abd pain, and pain with urination. No other complaints at this time.    In the ED: mild grade fever (100.9 s/p IV tylenol x1), elevated /95, HR and RR wnl satting 92% on RA. WBC 12.19. Glc 147. Lactate wnl. Trop neg. UA with trace of LE. COVID +. CT head/cervical spine with acute pathology. CXR with no gross infiltrate.

## 2022-12-22 NOTE — DISCHARGE NOTE PROVIDER - DID THE PATIENT PRESENT WITH OR WAS TREATED FOR MALNUTRITION DURING THIS ADMISSION
REASON FOR VISIT:  Preoperative clearance.    Preoperative clearance is requested by ..    HISTORY OF PRESENT ILLNESS::  Carrie is a 76 year old female who presents to the clinic for for preop clearance for her left foot surgery.  She has been having ongoing issues with her left foot bunion which has been going on since past many months.  Symptoms have progressively gotten worse to the point that she is having increased great toe pain and discomfort which is affecting her day-to-day and life activities at this time.  She did had a history of right bunion bunionectomy and or score action in 2000 which has helped tremendously without any complications.  As her symptoms have been getting worse she will undergo left chevron bunionectomy with possible akin osteotomy.  She has been doing fine on current meds.  Denies any side effects to medications at this time.  She denies any chest pain, shortness of breath, nausea, vomiting at this time.  Has no other complaints or concerns.    Past Medical History:   Diagnosis Date   • Anxiety    • Bronchitis    • Bunion, left    • Depression    • Hyperlipidemia    • PONV (postoperative nausea and vomiting)    • Raynaud disease    • SCC (squamous cell carcinoma) in SITU , trunk 01/30/2018    L up chest       Past Surgical History:   Procedure Laterality Date   • Colonoscopy  8/14/13     at [facility]   • Colonoscopy      tubular adenoma (dr bautista )   • Dexa bone density axial skeleton  10/01/2013    [facility]   • Eye surgery      blephoplasty   • Foot/toes surgery proc unlisted      Unspecified foot/toes procedure   • Hysterectomy     • Mammo screening bilateral  2014    at [facility]   • Tonsillectomy     • Total abdom hysterectomy  1999    VIOLETA w BSO       [unfilled]    Current Outpatient Medications   Medication Sig Dispense Refill   • biest 0.75 cream (estradiol-estriol 20:80 in natural base) Insert 1 GM  vaginally  2 times a week 30 g 0   • Cyanocobalamin (B-12)  1000 MCG Tab Take 1 tablet by mouth daily. 30 tablet 0   • venlafaxine XR (EFFEXOR XR) 75 MG 24 hr capsule Take 1 capsule by mouth daily. 90 capsule 1   • atorvastatin (LIPITOR) 20 MG tablet Take 1 tablet by mouth nightly. 90 tablet 1   • sulfamethoxazole-trimethoprim (BACTRIM DS) 800-160 MG per tablet Take 1 tablet by mouth daily as needed. 20 tablet 2     No current facility-administered medications for this visit.        Family History   Problem Relation Age of Onset   • Stroke Father    • Hypertension Father    • Dementia/Alzheimers Mother    • Thyroid Sister        PERSONAL HISTORY:  Former smoker quit smoking many years ago, rarely drinks alcohol.    REVIEW OF SYSTEMS:  General: No fevers, chills, or malaise.  Respiratory: No cough. No hemoptysis.  Cardiovascular: No chest pain. No shortness of breath.  Gastrointestinal: No nausea, vomiting, or abdominal pain.  Genitourinary: Negative.  HEME/ONC: Negative.  Psychiatric: Negative.  Neurological: Negative.  Musculoskeletal: Negative.  HEENT: Negative.  Skin: No rashes.    PHYSICAL EXAMINATION:  General: She is alert and oriented. Appears to be in no acute discomfort.  Vitals: Noted.  HEENT: Normal.  Heart: S1, S2, regular.  Lungs: Clear to auscultation bilaterally.  Abdomen: Soft, nontender. No palpable masses. Bowel sounds present.  Extremities: No edema.  Neurological: Cranial nerves grossly intact. Motor strength 5/5 in both upper and lower extremities. Reflexes normal.  Pelvic and Rectal: Deferred by the patient.      ASSESSMENT AND PLAN:    Left Hallux abductovalgus:she will undergo left chevron bunionectomy with possible akin osteotomy  Anxiety and depression:  Doing fine on current dose of Effexor.  Continue current meds.    Her preoperative EKG is within normal limits.Preoperative labs - CBC. BMP are unremarkable.    I do not see any contraindications to surgery at this time. The patient is cleared for surgery.    Thank you for consultation and please  send a copy to .          No

## 2022-12-22 NOTE — H&P ADULT - ASSESSMENT
#Acute hypoxic respiratory failure in context of positive COVID  - Admit to Med  - Maintain on airborne isolation  - Continue supplemental O2 via NRB, titrate to maintain SpO2 92-98%  - Advise if pt requires non-rebreather mask, start self-proning and Solu-medrol  - Acetaminophen 650 mg PO q4h PRN fever  - F/U COVID labs  - Start pharmacologic DVT PPx: Lovenox 40 mg SQ daily   - Consider need for extended prophylaxis prior to discharge based on D-Dimer and calculated VTE risk.  - Goals of Care discussion had with patient: Full Code      #COPD  - Monitor vitals q4hr and continuous pulse oximtetry monitoring.   - Supplemental O2 PRN. Will attempt to wean patient to baseline O2 status. COPD patient will keep SPO2 goal 88-93%  - Treat inciting cause as above.     #CAD  #hx of CVA  -     89 y/o female with a PMHx of CAD, COPD, CVA, HLD, HTN, and basal cell carcinoma, anterolisthesis, degeneration of cervical intervertebral disc presents to the  ED on 12/21/22 BIBA c/o generalized weakness x 1day and knee pain. In the ED: VSS, WBC 12.19. Not meeting SIRS. COVID +. CT head/cervical spine with acute pathology. CXR with no gross infiltrate.     #Generalized weakness in the setting of  positive COVID  - Admit to Med  - Maintain on airborne isolation  - Not requiring O2 at this time   - Acetaminophen 650 mg PO q4h PRN fever  - Start pharmacologic DVT PPx: hep subq 12hr  - Goals of Care discussion had with patient: pt desires Resuscitation, but NO intubation       #knee pain b/l   -likely from arthritic changes in the knee  -f/u xray of Knee b/l   -tylenol for pain   -PT consult       #COPD  - Monitor vitals q4hr and continuous pulse oximtetry monitoring.   - Supplemental O2 PRN. COPD patient will keep SPO2 goal 88-93%  - Treat inciting cause as above  - albuterol PRN  - symbicort         #CAD  #HLD  #hx of CVA  -with no neurological focal deficit during this hospitalization  -CT head with no acute pathology, but does show chronic right inferior cerebellar hemisphere infarct  -atorvastatin 40mg at bedtime   -clopidogrel 75mg qd  -f/u lipid panel       #HTN  -metoprolol   -enalapril 10mg qd    #basal cell carcinoma  -asymmetrical black/brown skin lesion on RLE  -hem/onc consult       # anterolisthesis  #degeneration of cervical intervertebral disc   -CT cervical spine: Minimal multilevel grade 1 spondylolistheses  -pt with no weakness, numbness/tingling on UE b/l   -no intervention required at this time     #diet  -DASH/TLC      #DVT ppx  -hep subq  q12hr       89 y/o female with a PMHx of CAD, COPD, CVA, HLD, HTN, and basal cell carcinoma, anterolisthesis, degeneration of cervical intervertebral disc presents to the  ED on 12/21/22 BIBA c/o generalized weakness x 1day and knee pain. In the ED: VSS, WBC 12.19. Not meeting SIRS. COVID +. CT head/cervical spine with acute pathology. CXR with no gross infiltrate.     #Generalized weakness in the setting of  positive COVID vs UTI  - Admit to Med  - pt given cefepime x1 in the ED for possible UTI (UA unremarkable but pt did have mild fever)  - will not continue abx tx  - f/u blood culture   - mild grade fever 100.9 likely from COVID, (s/p IV tylenol x1 in the ED)  - Maintain on airborne isolation  - Not requiring O2 at this time   - Acetaminophen 650 mg PO q4h PRN fever  - Start pharmacologic DVT PPx: hep subq 12hr      #knee pain b/l   -likely from arthritic changes in the knee  -f/u xray of Knee b/l   -tylenol for pain   -PT consult       #COPD  - Monitor vitals q4hr and continuous pulse oximtetry monitoring.   - Supplemental O2 PRN. COPD patient will keep SPO2 goal 88-93%  - Treat inciting cause as above  - albuterol PRN  - symbicort         #CAD  #HLD  #hx of CVA  -with no neurological focal deficit during this hospitalization  -CT head with no acute pathology, but does show chronic right inferior cerebellar hemisphere infarct  -atorvastatin 40mg at bedtime   -clopidogrel 75mg qd  -f/u lipid panel       #HTN  -metoprolol 25gm qd  -enalapril 10mg qd      #basal cell carcinoma  -asymmetrical black/brown skin lesion on RLE  -call family for further hx      # anterolisthesis  #degeneration of cervical intervertebral disc   -CT cervical spine: Minimal multilevel grade 1 spondylolistheses  -pt with no weakness, numbness/tingling on UE b/l   -no intervention required at this time   -f/u outpatient       #diet  -DASH/TLC      #DVT ppx  -hep subq  q12hr    Dispo:   Pt likely discharge today if cleared by PT and fever controlled.   Goals of care was discussed with the patient. Pt expressed desire to be resuscitated, but unsure of intubation. Unable to reach family at this time. Please contact family if HCP is in placed.        89 y/o female with a PMHx of CAD, COPD, CVA, HLD, HTN, and basal cell carcinoma, anterolisthesis, degeneration of cervical intervertebral disc presents to the  ED on 12/21/22 BIBA c/o generalized weakness x 1day and knee pain. In the ED: VSS, WBC 12.19. Not meeting SIRS. COVID +. CT head/cervical spine with acute pathology. CXR with no gross infiltrate.     #Generalized weakness in the setting of  positive COVID vs UTI  - Admit to Med  - pt given cefepime x1 in the ED for possible UTI (UA unremarkable but pt did have mild fever)  - will not continue abx tx  - f/u blood culture   - mild grade fever 100.9 likely from COVID, (s/p IV tylenol x1 in the ED)  -generalized weakness maybe from dehydration (BUN 26) due to COVID   - Maintain on airborne isolation  - Not requiring O2 at this time   - Acetaminophen 650 mg PO q4h PRN fever  - Start pharmacologic DVT PPx: hep subq 12hr      #knee pain b/l   -likely from arthritic changes in the knee  -f/u xray of Knee b/l   -tylenol for pain   -PT consult       #COPD  - Monitor vitals q4hr and continuous pulse oximtetry monitoring.   - Supplemental O2 PRN. COPD patient will keep SPO2 goal 88-93%  - Treat inciting cause as above  - albuterol PRN  - symbicort         #CAD  #HLD  #hx of CVA  -with no neurological focal deficit during this hospitalization  -CT head with no acute pathology, but does show chronic right inferior cerebellar hemisphere infarct  -atorvastatin 40mg at bedtime   -clopidogrel 75mg qd  -f/u lipid panel       #HTN  -metoprolol 25gm qd  -enalapril 10mg qd      #basal cell carcinoma  -asymmetrical black/brown skin lesion on RLE  -call family for further hx      # anterolisthesis  #degeneration of cervical intervertebral disc   -CT cervical spine: Minimal multilevel grade 1 spondylolistheses  -pt with no weakness, numbness/tingling on UE b/l   -no intervention required at this time   -f/u outpatient       #diet  -DASH/TLC      #DVT ppx  -hep subq  q12hr    Dispo:   Pt likely discharge today if cleared by PT and fever controlled.   Goals of care was discussed with the patient. Pt expressed desire to be resuscitated, but unsure of intubation. Unable to reach family at this time. Please contact family if HCP is in placed.        89 y/o female with a PMHx of CAD, COPD, CVA, HLD, HTN, and basal cell carcinoma, anterolisthesis, degeneration of cervical intervertebral disc presents to the  ED on 12/21/22 BIBA c/o generalized weakness x 1day and knee pain. In the ED: VSS, WBC 12.19. Not meeting SIRS. COVID +. CT head/cervical spine with acute pathology. CXR with no gross infiltrate.     #Generalized weakness in the setting of  positive COVID vs UTI  - Admit to Med  - pt given cefepime x1 in the ED for possible UTI (UA unremarkable but pt did have mild fever)  - will not continue abx tx  - f/u blood culture   - mild grade fever 100.9 likely from COVID, (s/p IV tylenol x1 in the ED)  -generalized weakness maybe from dehydration (BUN 26) due to COVID   - Maintain on airborne isolation  - Not requiring O2 at this time   - Acetaminophen 650 mg PO q4h PRN fever  - Start pharmacologic DVT PPx: hep subq 12hr      #knee pain b/l   -likely from arthritic changes in the knee  -f/u xray of Knee b/l   -tylenol for pain   -PT consult       #COPD  - Monitor vitals q4hr and continuous pulse oximtetry monitoring.   - Supplemental O2 PRN. COPD patient will keep SPO2 goal 88-93%  - Treat inciting cause as above  - albuterol PRN  - symbicort         #CAD  #HLD  #hx of CVA  -with no neurological focal deficit during this hospitalization  -CT head with no acute pathology, but does show chronic right inferior cerebellar hemisphere infarct  -atorvastatin 40mg at bedtime   -clopidogrel 75mg qd  -f/u lipid panel       #HTN  -metoprolol 25gm qd  -enalapril 10mg qd      #basal cell carcinoma  -asymmetrical black/brown skin lesion on RLE  -call family for further hx      # anterolisthesis  #degeneration of cervical intervertebral disc   -CT cervical spine: Minimal multilevel grade 1 spondylolistheses  -pt with no weakness, numbness/tingling on UE b/l   -no intervention required at this time   -f/u outpatient       #diet  -DASH/TLC      #DVT ppx  -hep subq  q12hr    Dispo:   -Pt likely discharge today if cleared by PT and fever controlled.   -Goals of care was discussed with the patient. Pt expressed desire to be resuscitated, but unsure of intubation. Please have another discussion regarding a code status. Unable to reach family at this time. Please contact family if HCP is in placed.   -Please verify med list. (Metoprolol dosage?) f/u with provider to rn.        89 y/o female with a PMHx of CAD, COPD, CVA, HLD, HTN, and basal cell carcinoma, anterolisthesis, degeneration of cervical intervertebral disc presents to the  ED on 12/21/22 BIBA c/o generalized weakness x 1day and knee pain. In the ED: VSS, WBC 12.19. Not meeting SIRS. COVID +. CT head/cervical spine with acute pathology. CXR with no gross infiltrate.     #Generalized weakness in the setting of  positive COVID vs UTI  - Admit to Med  - pt given cefepime x1 in the ED for possible UTI (UA unremarkable but pt did have mild fever)  - will not continue abx tx  - f/u blood culture   - mild grade fever 100.9 likely from COVID, (s/p IV tylenol x1 in the ED)  -generalized weakness maybe from dehydration (BUN 26) due to COVID   - Maintain on airborne isolation  - Not requiring O2 at this time   - Acetaminophen 650 mg PO q4h PRN fever  - Start pharmacologic DVT PPx: hep subq 12hr      #knee pain b/l   -likely from arthritic changes in the knee  -f/u xray of Knee b/l   -tylenol for pain   -PT consult       #COPD  - Monitor vitals q4hr and continuous pulse oximtetry monitoring.   - Supplemental O2 PRN. COPD patient will keep SPO2 goal 88-93%  - Treat inciting cause as above  - albuterol PRN  - symbicort         #CAD  #HLD  #hx of CVA  -with no neurological focal deficit during this hospitalization  -CT head with no acute pathology, but does show chronic right inferior cerebellar hemisphere infarct  -atorvastatin 40mg at bedtime   -clopidogrel 75mg qd  -f/u lipid panel       #HTN  -metoprolol 25gm qd (please confirm with the family if this is the right dosage)  -enalapril 10mg qd      #basal cell carcinoma  -asymmetrical black/brown skin lesion on RLE  -c/w with silver sulfadiazine 1% cream   -call family for further hx, and what is the current treatment plan       # anterolisthesis  #degeneration of cervical intervertebral disc   -CT cervical spine: Minimal multilevel grade 1 spondylolistheses  -pt with no weakness, numbness/tingling on UE b/l   -no intervention required at this time   -f/u outpatient       #diet  -DASH/TLC      #DVT ppx  -hep subq  q12hr    Dispo:   -Pt likely discharge today if cleared by PT and fever controlled.   -Goals of care was discussed with the patient. Pt expressed desire to be resuscitated, but unsure of intubation. Please have another discussion regarding a code status. Unable to reach family at this time. Please contact family if HCP is in placed.   -Please verify med list. (Metoprolol dosage?) f/u with provider to rn.        89 y/o female with a PMHx of CAD, COPD, CVA, HLD, HTN, and basal cell carcinoma, anterolisthesis, degeneration of cervical intervertebral disc presents to the  ED on 12/21/22 BIBA c/o generalized weakness x 1day and knee pain. In the ED: VSS, WBC 12.19. Not meeting SIRS. COVID +. CT head/cervical spine with acute pathology. CXR with no gross infiltrate.     #Generalized weakness in the setting of  positive COVID vs UTI  - Admit to Med  - pt given cefepime x1 in the ED for possible UTI (UA unremarkable but pt did have mild fever)  - will not continue abx tx  - f/u blood culture   - mild grade fever 100.9 likely from COVID, (s/p IV tylenol x1 in the ED)  -generalized weakness maybe from dehydration (BUN 26) due to COVID   - Maintain on airborne isolation  - Not requiring O2 at this time   - Acetaminophen 650 mg PO q4h PRN fever  - Start pharmacologic DVT PPx: hep subq 12hr      #knee pain b/l   -likely from arthritic changes in the knee  -f/u xray of Knee b/l   -tylenol for pain   -PT consult       #COPD  - Monitor vitals q4hr and continuous pulse oximtetry monitoring.   - Supplemental O2 PRN. COPD patient will keep SPO2 goal 88-93%  - Treat inciting cause as above  - albuterol PRN  - symbicort         #CAD  #HLD  #hx of CVA  -with no neurological focal deficit during this hospitalization  -CT head with no acute pathology, but does show chronic right inferior cerebellar hemisphere infarct  -atorvastatin 40mg at bedtime   -clopidogrel 75mg qd  -f/u lipid panel       #HTN  -metoprolol 25gm qd (please confirm with the family if this is the right dosage)  -enalapril 10mg qd      #basal cell carcinoma  -asymmetrical black/brown skin lesion on RLE  -c/w with silver sulfadiazine 1% cream   -call family for further hx, and what is the current treatment plan       # anterolisthesis  #degeneration of cervical intervertebral disc   -CT cervical spine: Minimal multilevel grade 1 spondylolistheses  -pt with no weakness, numbness/tingling on UE b/l   -no intervention required at this time   -f/u outpatient       #diet  -DASH/TLC      #DVT ppx  -hep subq  q12hr      #code status   -full code    Dispo:   -Pt likely discharge today if cleared by PT and fever controlled.   -Goals of care was discussed with the patient. Pt is full code. Please contact family if HCP is in placed.   -Please verify med list. (Metoprolol dosage?) f/u with provider to rn.        89 y/o female with a PMHx of CAD, COPD, CVA, HLD, HTN, and basal cell carcinoma, anterolisthesis, degeneration of cervical intervertebral disc presents to the  ED on 12/21/22 BIBA c/o generalized weakness x 1day and knee pain. In the ED: VSS, WBC 12.19. COVID +. CT head/cervical spine with acute pathology. CXR with no gross infiltrate.     #Generalized weakness in the setting of  positive COVID vs UTI  - Admit to Med  - pt given cefepime x1 in the ED for sepsis 2/2 possible UTI   - pt meeting SIRS criteria (fever, WBC 12), likely from COVID  - will not continue abx tx  - f/u blood culture   - mild grade fever 100.9 likely from COVID, (s/p IV tylenol x1 in the ED)  -generalized weakness maybe from dehydration (BUN 26) due to COVID   - Maintain on airborne isolation  - Not requiring O2 at this time   - Acetaminophen 650 mg PO q4h PRN fever  - Start pharmacologic DVT PPx: hep subq 12hr      #knee pain b/l   -likely from arthritic changes in the knee  -f/u xray of Knee b/l   -tylenol for pain   -PT consult       #COPD  - Monitor vitals q4hr and continuous pulse oximtetry monitoring.   - Supplemental O2 PRN. COPD patient will keep SPO2 goal 88-93%  - Treat inciting cause as above  - albuterol PRN  - symbicort         #CAD  #HLD  #hx of CVA  -with no neurological focal deficit during this hospitalization  -CT head with no acute pathology, but does show chronic right inferior cerebellar hemisphere infarct  -atorvastatin 40mg at bedtime   -clopidogrel 75mg qd  -f/u lipid panel       #HTN  -metoprolol 25gm qd (please confirm with the family if this is the right dosage)  -enalapril 10mg qd      #basal cell carcinoma  -asymmetrical black/brown skin lesion on RLE  -c/w with silver sulfadiazine 1% cream   -call family for further hx, and what is the current treatment plan       # anterolisthesis  #degeneration of cervical intervertebral disc   -CT cervical spine: Minimal multilevel grade 1 spondylolistheses  -pt with no weakness, numbness/tingling on UE b/l   -no intervention required at this time   -f/u outpatient       #diet  -DASH/TLC      #DVT ppx  -hep subq  q12hr      #code status   -full code    Dispo:   -Pt likely discharge today if cleared by PT and fever controlled.   -Goals of care was discussed with the patient. Pt is full code. Please contact family if HCP is in placed.   -Please verify med list. (Metoprolol dosage?) f/u with provider to rn.

## 2022-12-22 NOTE — H&P ADULT - ATTENDING COMMENTS
Pt is a pleasant 89 y/o female with a PMHx of CAD, COPD, CVA, HLD, HTN, and basal cell carcinoma, anterolisthesis, degeneration of cervical intervertebral disc admitted with fever 100.9F,  weakness , mild dehydration  and Covid positive.  Pt has severe knee arthritis      - s/p Cefepime in the ED,  will not cont as pt does not have a source for bacterial infection   - blood cx  - supportive care     - physical therapy

## 2022-12-22 NOTE — H&P ADULT - NSHPSOCIALHISTORY_GEN_ALL_CORE
Lives with her son, daughter-in-law, and two grand children. Able to perform ADL prior to admission. Denies use of tobacco, alcohol, and illicit drug.

## 2022-12-22 NOTE — DISCHARGE NOTE PROVIDER - ATTENDING DISCHARGE PHYSICAL EXAMINATION:
Patient seen and examined with KELLY Young.  I was physically present for the key portions of the evaluation and management (E/M) service provided.  I agree with the above history, physical, and plan which I have reviewed and edited where appropriate.  lungs cta b/l  - no indication for rem/dex  - stable for dc to apex today

## 2022-12-22 NOTE — PHYSICAL THERAPY INITIAL EVALUATION ADULT - PERTINENT HX OF CURRENT PROBLEM, REHAB EVAL
89 y/o female with a PMHx of CAD, COPD, CVA, HLD, HTN, and basal cell carcinoma, anterolisthesis, degeneration of cervical intervertebral disc presents to the  ED on 12/21/22 BIBA c/o generalized weakness x 1day. Pt reports she had difficulty getting up from a chair due to weakness in the legs, causing her to slid down onto a table. CT head/cervical spine with no acute pathology. CXR with no gross infiltrate. Pt. s/p Generalized weakness in the setting of  positive COVID vs UTI.

## 2022-12-22 NOTE — H&P ADULT - NSHPREVIEWOFSYSTEMS_GEN_ALL_CORE
REVIEW OF SYSTEMS:  CONSTITUTIONAL: No weakness, fevers or chills  EYES/ENT: No visual changes;  No vertigo or throat pain   NECK: No pain or stiffness  RESPIRATORY: No cough, wheezing, hemoptysis; No shortness of breath  CARDIOVASCULAR: No chest pain or palpitations  GASTROINTESTINAL: No abdominal or epigastric pain. No nausea, vomiting, or hematemesis; No diarrhea or constipation. No melena or hematochezia.  GENITOURINARY: No dysuria, frequency or hematuria  NEUROLOGICAL: No numbness or weakness  SKIN: No itching, rashes REVIEW OF SYSTEMS:  CONSTITUTIONAL: + weakness, no fevers or chills  EYES/ENT: No visual changes;  No vertigo or throat pain   NECK: No pain or stiffness  RESPIRATORY: No cough, wheezing, hemoptysis; No shortness of breath  CARDIOVASCULAR: No chest pain or palpitations  GASTROINTESTINAL: No abdominal or epigastric pain. No nausea, vomiting, or hematemesis; No constipation. watery green diarrhea x1 last week. No melena or hematochezia.  GENITOURINARY: No dysuria, frequency or hematuria  NEUROLOGICAL: No numbness or weakness  SKIN: No itching.  Psych: normal affect

## 2022-12-22 NOTE — DISCHARGE NOTE PROVIDER - NSDCCPCAREPLAN_GEN_ALL_CORE_FT
PRINCIPAL DISCHARGE DIAGNOSIS  Diagnosis: 2019 novel coronavirus disease (COVID-19)  Assessment and Plan of Treatment: asymptomatic covid patient- no respiratory symptoms  follow isolation as per CDC guidelines.   f/uw ith outpatient PCP  resume meds as per your dischagre medication apaperwork      SECONDARY DISCHARGE DIAGNOSES  Diagnosis: Generalized weakness  Assessment and Plan of Treatment:

## 2022-12-22 NOTE — DISCHARGE NOTE NURSING/CASE MANAGEMENT/SOCIAL WORK - PATIENT PORTAL LINK FT
You can access the FollowMyHealth Patient Portal offered by [facility] by registering at the following website: [email or web address]. By joining [facility]’s FollowMyHealth portal, you will also be able to view your health information using other applications (apps) compatible with our system.

## 2022-12-22 NOTE — H&P ADULT - BILLING PROVIDER USE ONLY
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  History of HPV.  Remote history of Chlamydia Attending or DANIELA Only

## 2022-12-22 NOTE — DISCHARGE NOTE PROVIDER - HOSPITAL COURSE
89 y/o female with a PMHx of CAD, COPD, CVA, HLD, HTN, and basal cell carcinoma, anterolisthesis, degeneration of cervical intervertebral disc presents to the  ED on 12/21/22 BIBA c/o generalized weakness x 1day. Pt reports she had difficulty getting up from a chair due to weakness in the legs, causing her to slid down onto a table. Denies falling to the floor and trauma to head. Denies HA, CP, SOB, vision changes, n/v, abd pain, and pain with urination. No other complaints at this time.    In the ED: mild grade fever (100.9 s/p IV tylenol x1), elevated /95, HR and RR wnl saturating 92% on RA. WBC 12.19. Glc 147. Lactate wnl. Trop neg. UA with trace of LE. COVID +. CT head/cervical spine with acute pathology. CXR with no gross infiltrate.     12/22- patient was seen and examined- on room air- denies fever or shortness of breath, denies chest pain.    Vital Signs Last 24 Hrs  T(C): 36.8 (22 Dec 2022 10:50), Max: 38.3 (21 Dec 2022 17:13)  T(F): 98.2 (22 Dec 2022 10:50), Max: 100.9 (21 Dec 2022 17:13)  HR: 78 (22 Dec 2022 10:50) (59 - 78)  BP: 126/49 (22 Dec 2022 10:50) (110/60 - 164/95)  BP(mean): 82 (21 Dec 2022 18:20) (82 - 82)  RR: 17 (22 Dec 2022 10:50) (17 - 19)  SpO2: 95% (22 Dec 2022 10:50) (92% - 99%)    Parameters below as of 22 Dec 2022 05:07  Patient On (Oxygen Delivery Method): room air    ROS:   All 10 systems reviewed and found to be negative with the exception of what has been described above.   PE:  Constitutional: NAD, laying in bed  HEENT: NC/AT  Back: no tenderness  Respiratory: respirations even and non labored, LCTA   Cardiovascular: S1S2 regular, no murmurs  Abdomen: soft, not tender, not distended, positive BS  Genitourinary: voiding  Musculoskeletal: no muscle tenderness, no joint swelling or tenderness  Extremities: no pedal edema   Neurological: no focal deficits    Labs/Meds     #Generalized weakness in the setting of  positive COVID vs UTI  - pt given cefepime x1 in the ED for sepsis 2/2 possible UTI   - pt meeting SIRS criteria (fever, WBC 12), likely from COVID  - will not continue abx tx  - f/u blood culture - ngtd  - mild grade fever 100.9 likely from COVID, (s/p IV tylenol x1 in the ED)  -generalized weakness maybe from dehydration (BUN 26) due to COVID   - Maintain on airborne isolation  - Not requiring O2 at this time   - Start pharmacologic DVT PPx: hep subq 12h    #knee pain b/l   -likely from arthritic changes in the knee  -f/u xray of Knee b/l   -tylenol for pain   -PT consult     #COPD- stable   - Supplemental O2 PRN. COPD patient will keep SPO2 goal 88-93%  - albuterol PRN/symbicort    #CAD  #HLD  #hx of CVA  -with no neurological focal deficit during this hospitalization  -CT head with no acute pathology, but does show chronic right inferior cerebellar hemisphere infarct  -atorvastatin 40mg at bedtime   -clopidogrel 75mg qd    #HTN- stable      #basal cell carcinoma  -asymmetrical black/brown skin lesion on RLE  -c/w with silver sulfadiazine 1% cream     # anterolisthesis- stable   #degeneration of cervical intervertebral disc   -CT cervical spine: Minimal multilevel grade 1 spondylolistheses  -pt with no weakness, numbness/tingling on UE b/l   -no intervention required at this time   -f/u outpatient     Case d/w team on IDR.   Patient is medically cleared for dc.

## 2022-12-22 NOTE — PHYSICAL THERAPY INITIAL EVALUATION ADULT - CRITERIA FOR SKILLED THERAPEUTIC INTERVENTIONS
impairments found/risk reduction/prevention/rehab potential/therapy frequency/anticipated discharge recommendation

## 2022-12-22 NOTE — H&P ADULT - NSHPPHYSICALEXAM_GEN_ALL_CORE
T(C): 36.2 (12-21-22 @ 22:42), Max: 38.3 (12-21-22 @ 17:13)  T(F): 97.2 (12-21-22 @ 22:42), Max: 100.9 (12-21-22 @ 17:13)  HR: 62 (12-21-22 @ 21:44) (59 - 73)  BP: 119/45 (12-21-22 @ 22:42) (110/60 - 164/95)  RR: 18 (12-21-22 @ 22:42) (18 - 19)  SpO2: 96% (12-21-22 @ 22:42) (92% - 99%)    PHYSICAL EXAM:  Constitutional: Pt lying in bed, awake and alert, NAD  HEENT: EOMI, normal hearing, moist mucous membranes  Neck: Soft and supple, no JVD  Respiratory: CTABL, No wheezing, rales or rhonchi  Cardiovascular: S1S2+, RRR, no M/G/R  Gastrointestinal: BS+, soft, NT/ND, no guarding, no rebound  Extremities: No peripheral edema  Vascular: 2+ peripheral pulses  Neurological: AAOx3, no focal deficits  Musculoskeletal: 5/5 strength b/l upper and lower extremities  Skin: No rashes T(C): 36.2 (12-21-22 @ 22:42), Max: 38.3 (12-21-22 @ 17:13)  T(F): 97.2 (12-21-22 @ 22:42), Max: 100.9 (12-21-22 @ 17:13)  HR: 62 (12-21-22 @ 21:44) (59 - 73)  BP: 119/45 (12-21-22 @ 22:42) (110/60 - 164/95)  RR: 18 (12-21-22 @ 22:42) (18 - 19)  SpO2: 96% (12-21-22 @ 22:42) (92% - 99%)    PHYSICAL EXAM:  Constitutional: Pt lying in bed, awake and alert, NAD.   HEENT: Normocephalic, atraumatic, no signs of trauma on head/face/scalp. EOMI, normal hearing, moist mucous membranes  Neck: Soft and supple  Respiratory: CTABL, No wheezing, rales or rhonchi  Cardiovascular: S1S2+, RRR, +murmur   Gastrointestinal: BS+, soft, NT/ND, no guarding, no rebound  Extremities: No peripheral edema, knee braces b/l   Vascular: 2+ peripheral pulses  Musculoskeletal: 5/5 strength b/l upper and lower extremities  Skin: multiple asymmetrical black/brown skin lesion on RLE.     Neuro: A&Ox3, speech clear. Answers questions appropriately  CN: Facial movements symmetrical, no facial droop, tongue protrusion midline.   Motor strength: Full and equal 5/5 strength B/L upper extremities,   Sensory:  Sensation intact.  Cerebellar: normal finger to nose, normal heel to shin T(C): 36.2 (12-21-22 @ 22:42), Max: 38.3 (12-21-22 @ 17:13)  T(F): 97.2 (12-21-22 @ 22:42), Max: 100.9 (12-21-22 @ 17:13)  HR: 62 (12-21-22 @ 21:44) (59 - 73)  BP: 119/45 (12-21-22 @ 22:42) (110/60 - 164/95)  RR: 18 (12-21-22 @ 22:42) (18 - 19)  SpO2: 96% (12-21-22 @ 22:42) (92% - 99%)    PHYSICAL EXAM:  Constitutional: Pt lying in bed, awake and alert, NAD.   HEENT: Normocephalic, atraumatic, no signs of trauma on head/face/scalp. EOMI, normal hearing, moist mucous membranes  Neck: Soft and supple  Respiratory: CTABL, No wheezing, rales or rhonchi  Cardiovascular: S1S2+, RRR, +murmur   Gastrointestinal: BS+, soft, NT/ND, no guarding, no rebound  Extremities: No peripheral edema, knee braces b/l   Vascular: 2+ peripheral pulses  Musculoskeletal: 5/5 strength b/l upper and lower extremities  Skin: multiple asymmetrical black/brown skin lesion on RLE.     Neuro: A&Ox3, speech clear. Answers questions appropriately  CN: Facial movements symmetrical, no facial droop, tongue protrusion midline.   Motor strength: Full and equal 5/5 strength B/L upper and lower extremities (pain with knee extension)  Sensory:  Sensation intact.

## 2022-12-22 NOTE — PHYSICAL THERAPY INITIAL EVALUATION ADULT - ADDITIONAL COMMENTS
Lives with her son, daughter-in-law, and two grand children. Able to perform ADL prior to admission. Lives with her son, daughter-in-law, and two grand children. Able to perform ADL prior to admission. Has HHA 2x day from 8-2. Pt. amb with RW short distance at home.

## 2022-12-24 PROBLEM — I63.9 CEREBRAL INFARCTION, UNSPECIFIED: Chronic | Status: ACTIVE | Noted: 2022-01-01

## 2022-12-24 PROBLEM — C44.91 BASAL CELL CARCINOMA OF SKIN, UNSPECIFIED: Chronic | Status: ACTIVE | Noted: 2022-01-01

## 2022-12-24 PROBLEM — E78.5 HYPERLIPIDEMIA, UNSPECIFIED: Chronic | Status: ACTIVE | Noted: 2022-01-01

## 2022-12-24 PROBLEM — M50.30 OTHER CERVICAL DISC DEGENERATION, UNSPECIFIED CERVICAL REGION: Chronic | Status: ACTIVE | Noted: 2022-01-01

## 2022-12-24 PROBLEM — I10 ESSENTIAL (PRIMARY) HYPERTENSION: Chronic | Status: ACTIVE | Noted: 2022-01-01

## 2022-12-24 PROBLEM — M43.10 SPONDYLOLISTHESIS, SITE UNSPECIFIED: Chronic | Status: ACTIVE | Noted: 2022-01-01

## 2022-12-24 NOTE — PATIENT PROFILE ADULT - NSPROPTRIGHTCAREGIVER_GEN_A_NUR
PRE-OP EVALUATION    Patient Name: Ivan Jacobsen    Pre-op Diagnosis: DIARRHEA, GENERALIZED ABDOMINAL PAIN    Procedure(s):  ESOPHAGOGASTRODUODENOSCOPY, COLONOSCOPY      Surgeon(s) and Role:     Marlon Garibay MD - Primary    Pre-op vitals reviewed yes

## 2022-12-24 NOTE — ED ADULT NURSE REASSESSMENT NOTE - NS ED NURSE REASSESS COMMENT FT1
Code Stroke called 1348 by MD Grijalva. Delay on stroke call d/t initial symptoms beginning appx 3 days ago.

## 2022-12-24 NOTE — ED PROVIDER NOTE - CLINICAL SUMMARY MEDICAL DECISION MAKING FREE TEXT BOX
Pt with new onset Afib with RVR. Pt not on anticoagulation, appears to have CVA as per discussion with nurse at [facility] as well as family Juan. Pt sx at least since this morning was noted by pt daughter to have slurred speech that was not typical around 11AM but as per nurse who took over at 730AM, states it was present at 730AM, reported normal. Yesterday according to son in afternoon last spoken to, unable to determine last known well would have to treat this as a wake up stroke. This would put pt outside of TPA window, discussed this with son and pt, no LVO identified on CT angio. I did contact transfer center, discuss with tele stroke pending call back, will provide rate control. Pt is otherwise symptomatic no new fever chills cough cannot recall onset of sx to this time still does not note any difference in baseline, will require admission for further workup. Pt with new onset Afib with RVR. Pt not on anticoagulation, appears to have CVA since at least 730 am this morning as per discussion with nurse at [facility] as well as family member.  Pt noted by daughter to have slurred speech that was not typical around 11AM but as per nurse who took over at 730AM, states it was present at 730AM.  Yesterday, according to son, patient was at baseline in afternoon.   Pt outside of TPA window. No e/o LVO - not a candidate for intervention. I did contact transfer center and discussed with tele stroke.  will provide rate control at this time, but will attempt to avoid BP lowering as much as possible.  WIll admit for further w/u and management.

## 2022-12-24 NOTE — H&P ADULT - ASSESSMENT
89 yo female with Hx. of CAD, COPD, CVA R inferior cerebellar infarct, HLD, HTN, cervical spondylosis, Hospitalized in  on 12/22 for weakness sec. to COVID 19 infection , was discharged to [facility] for CED on 12/22/22, was sent to the ER this am for slurred speech. The patient son states he did a facetime video with her around 11 am and found her to have slurred speech and hoarse voce.  In the ER she was found with new onset Atrial fib. with RVR , Left sided weakness,  was treated with IV Cardizem by ER MD. Low Bp treated with IVNS.  Troponin 4870,   assessment Dx:                       1. Acute CVA                       2. NSTMI  very  elevated troponin                       3. New onset Atrial fib                        4. LLL pneumonia                        5. Leukocytosis                        6. COVID 19 infection                       7. Acute on chronic renal failure                       8. COPD,                            Plan:    admit to Telemetry stroke protocol,  NPO, neurochecks                medications: Started on Cardizem drip for  Atrial fib with RVR, IVF for ARF, Started on Cefepime for Pneumonia , continue current medications as per med rec.               VTEP: Heparin                Labs: cbc,cmp, troponin,                Radiology: CTH reviewed,  MRI ordered , Cxray reviewed,                Cardiac diagnostics: EKG , Echocardiogram, Troponins               Consults: Neurology, cardiology, ID,                 Advance Directive: full code as per HCP wishes.                                  89 yo female with Hx. of CAD, COPD, CVA R inferior cerebellar infarct, HLD, HTN, cervical spondylosis, Hospitalized in  on 12/22 for weakness sec. to COVID 19 infection , was discharged to [place] for CED on 12/22/22, was sent to the ER this am for slurred speech. The patient son states he did a facetime video with her around 11 am and found her to have slurred speech and hoarse voce.  In the ER she was found with new onset Atrial fib. with RVR , Left sided weakness,  was treated with IV Cardizem by ER MD. Low Bp treated with IVNS.  Troponin 4870,   assessment Dx:                       1. Acute CVA                       2. NSTMI  very  elevated troponin                       3. New onset Atrial fib                        4. LLL pneumonia                        5. Leukocytosis                        6. COVID 19 infection                       7. Acute on chronic renal failure                       8. COPD,                            Plan:    admit to Telemetry stroke protocol,  NPO, neurochecks                medications: Started on Cardizem drip for  Atrial fib with RVR, IVF for ARF, Started on Cefepime for Pneumonia , continue current medications as per med rec.               VTEP: Heparin                Labs: cbc,cmp, troponin,                Radiology: CTH reviewed,  MRI ordered , Cxray reviewed,                Cardiac diagnostics: EKG , Echocardiogram, Troponins               Consults: Neurology, cardiology, ID, ST, PT, OT.                Advance Directive: full code as per HCP wishes.                                  89 yo female with Hx. of CAD, COPD, CVA R inferior cerebellar infarct, HLD, HTN, cervical spondylosis, Hospitalized in  on 12/22 for weakness sec. to COVID 19 infection , was discharged to [place] for CED on 12/22/22, was sent to the ER this am for slurred speech. The patient son states he did a facetime video with her around 11 am and found her to have slurred speech and hoarse voice.  In the ER she was found with new onset Atrial fib. with RVR , Left sided weakness,  was treated with IV Cardizem by ER MD. Low Bp treated with IVNS.  Troponin 4870, CXray LLL pneumonia, wbc 15.   assessment Dx:                       1. Acute CVA                       2. NSTMI  very  elevated troponin                       3. New onset Atrial fib                        4. LLL pneumonia                        5. Leukocytosis                        6. COVID 19 infection                       7. Acute on chronic renal failure                       8. COPD,                            Plan:    admit to Telemetry stroke protocol,  NPO, neurochecks                medications: Started on Cardizem drip for  Atrial fib with RVR, IVF for ARF, Started on Cefepime for Pneumonia , continue current medications as per med rec.               VTEP: Heparin                Labs: cbc,cmp, troponin,                Radiology: CTH reviewed,  MRI ordered , Cxray reviewed,                Cardiac diagnostics: EKG , Echocardiogram, Troponins               Consults: Neurology, cardiology, ID, ST, PT, OT.                Advance Directive: full code as per HCP wishes.

## 2022-12-24 NOTE — ED STATDOCS - OBJECTIVE STATEMENT
Called and spoke with team at [facility], pt is new to facility team taking care of pt today, completely new does not know pt baseline. Pt family thought there was more slurred speech today, [place] notes pt has had slurred speech for the past few days, family noticed possible facial droop today, unchanged from pics before, last known normal yesterday, code stroke called. Pt was seen upon arrival at triage. Called and spoke with team at [facility], pt is new to facility team taking care of pt today, completely new does not know pt baseline. Pt family thought there was more slurred speech today, [place] notes pt has had slurred speech for the past few days, family noticed possible facial droop today, per [facility] pt looks like pictures from before, last known normal yesterday, [facility] also notes pt in rapid afib. code stroke called. Pt was seen upon arrival at triage.

## 2022-12-24 NOTE — H&P ADULT - HISTORY OF PRESENT ILLNESS
HPI: The patient is an 89 yo female with Hx. of CAD, COPD, CVA R inferior cerebellar infarct, HLD, HTN, cervical spondylosis, Hospitalized in  on  for weakness sec. to COVID 19 infection , was discharged to [facility] for CED on 22, was sent to the ER this am for slurred speech. The patient son states he did a facetime video with her around 11 am and found her to have slurred speech and hoarse voce.  In the ER she was found with new onset Atrial fib. with RVR , Left sided weakness,  was treated with IV Cardizem by ER MD. Low Bp treated with IVNS.  Troponin 4870,     PMHx: CAD, COPD, CVA R inferior cerebellar infarct, HLD, HTN, cervical spondylosis, BCC    PSHx: tonsillectomy     Family Hx: Mother  at age 62 form pancreatic CA , father  from pneumonia when the patient was 3 years old.     Social Hx.: not smoking, no alcohol use             HPI: The patient is an 89 yo female with Hx. of CAD, COPD, CVA R inferior cerebellar infarct, HLD, HTN, cervical spondylosis, Hospitalized in  on  for weakness sec. to COVID 19 infection , was discharged to [place] for CED on 22, was sent to the ER this am for slurred speech. The patient son states he did a facetime video with her around 11 am and found her to have slurred speech and hoarse voice.  In the ER she was found with new onset Atrial fib. with RVR , Left sided weakness,  was treated with IV Cardizem by ER MD. Low Bp treated with IVNS.  Troponin 4870,     PMHx: CAD, COPD, CVA R inferior cerebellar infarct, HLD, HTN, cervical spondylosis, BCC    PSHx: tonsillectomy     Family Hx: Mother  at age 62 form pancreatic CA , father  from pneumonia when the patient was 3 years old.     Social Hx.: not smoking, no alcohol use

## 2022-12-24 NOTE — ED PROVIDER NOTE - CARE PLAN
1 Principal Discharge DX:	Acute CVA (cerebrovascular accident)  Secondary Diagnosis:	Atrial fibrillation with rapid ventricular response

## 2022-12-24 NOTE — ED ADULT NURSE NOTE - PAIN RATING/NUMBER SCALE (0-10): ACTIVITY
Patient has met discharge criteria. VSS. Tolerated diet. No complaints of pain. Dressing CDI. AVS reviewed with patient. All questions answered at this time. Patient ambulating at baseline and left department.   0

## 2022-12-24 NOTE — ED STATDOCS - PROGRESS NOTE DETAILS
Attending Grijalva, PT seen on arrival in EMS.  Initial report was slurred speech for 3 days.  Upon further discussion with team at [facility], pt with more of a slurred speech today and, code stroke called.

## 2022-12-24 NOTE — ED ADULT NURSE NOTE - CHIEF COMPLAINT QUOTE
pt BIBA from [facility] d/t slurred speech. last known well 3 days ago. Pt just admitted to this nursing home 3 days ago & as per staff pt has been like this since arriving to NH. MD Valentine saldana pt in triage; No code stroke at this time. +COVID

## 2022-12-24 NOTE — PATIENT PROFILE ADULT - FALL HARM RISK - HARM RISK INTERVENTIONS
Assistance with ambulation/Assistance OOB with selected safe patient handling equipment/Communicate Risk of Fall with Harm to all staff/Reinforce activity limits and safety measures with patient and family/Review medications for side effects contributing to fall risk/Sit up slowly, dangle for a short time, stand at bedside before walking/Tailored Fall Risk Interventions/Toileting schedule using arm’s reach rule for commode and bathroom/Visual Cue: Yellow wristband and red socks/Bed in lowest position, wheels locked, appropriate side rails in place/Call bell, personal items and telephone in reach/Instruct patient to call for assistance before getting out of bed or chair/Non-slip footwear when patient is out of bed/[place] to call system/Physically safe environment - no spills, clutter or unnecessary equipment/Purposeful Proactive Rounding/Room/bathroom lighting operational, light cord in reach

## 2022-12-24 NOTE — ED ADULT NURSE NOTE - NS ED NURSE REPORT GIVEN DT
OFFICE VISIT    SUBJECTIVE:    I have reviewed the patient's problem list, medications, allergies, past medical, surgical, social and family history, updating these as appropriate.  See Histories section of the EMR for a display of this information.    Jake Padilla is a 3 year old male who presents with grandmother with parental permission due to concerns regarding sore throat for 1 day with some abdominal discomfort without cough or runny nose.  He does attend .  He had adenoidectomy in June of this year.  There are not known ill contacts.    OBJECTIVE:    PHYSICAL EXAM:  Vital Signs:    Visit Vitals  Pulse 108   Temp 98.7 °F (37.1 °C) (Temporal)   Resp 28   Wt 14.9 kg     Constitutional:  Well developed, well nourished child in no acute distress.  Active playful and talkative  Skin:  No rash.  HEENT:  Head:  Normocephalic.  Atraumatic.  Eyes:  No conjunctival injection or discharge.  Extraocular movements normal.  Ears:  Bilateral external ears normal.  Ear canals free of  excessive cerumen.  Tympanic membranes:  clear and mobile.  Nose:  No rhinorrhea.  Throat:  Moderate pharyngeal erythema and no exudate.  Oropharynx moist. Tonsils 2/4 size but diffusely coated with whitish exudate without peritonsillar swelling.  No oral lesions.  Dentition unremarkable.  Neck:  Normal range of motion.  No tenderness.  Supple but moderate anterior cervical adenopathy with lymph nodes symmetrically at least 3 cm in size rubbery to firm and appropriately mobile adenopathy or mass. No nuchal rigidity.  Lungs:  Clear to auscultation.  Heart:  Regular rate and rhythm without murmur.  Abdomen:  Bowel sounds normal.  Soft.  No tenderness.  No masses or hepatosplenomegaly.  No abdominal distention.  Genitourinary:  Deferred  Musculoskeletal:  No gross deformities or lesions  Neurologic:  Alert and interactive.  Not toxic or lethargic     ASSESSMENT:    1. Acute exudate of tonsillitis    PLAN:    1. Empiric antibiotic  coverage with cefprozil 250 mg per 5 mL, 5 mL by mouth twice per day for 10 days  2. Symptomatic treatment reviewed and indications to call or return discussed. EPIC references in the AVS.   3. He is to stay home from  tomorrow but can resume  the following day if no fever   24-Dec-2022 17:18

## 2022-12-24 NOTE — CONSULT NOTE ADULT - SUBJECTIVE AND OBJECTIVE BOX
Patient is a 89y old  Female who presents with a chief complaint of CVA, MI, Atrial fib., (24 Dec 2022 18:13    HPI: The patient is an 89 yo female with Hx. of CAD, COPD, CVA R inferior cerebellar infarct, HLD, HTN, cervical spondylosis, Hospitalized in  on 12/22 for weakness in the setting of  COVID 19 infection. She was discharged to [place] for CED on 12/22/22. This morning she was sent to the ER for slurred speech. Her son reported that he had a video call with her at approximately 11 AM at which time he found that her speech was slurred and her voice was hoarse.   The ED physician spoke with a nurse at [facility] who stated that her speech was slurred upon awakening at 7:30 AM. Last known normal was unclear.   In the ED she was found to have atrial fibrillation and RVR.  Telestroke was consulted by the ED.  She was not a candidate for tpa due to no last known normal and did not have a large vessel occlusion seen on imaging.  It was recommended to hold off on anticoagulation for now and to allow for permissive hypertension.         (24 Dec 2022 18:13)      PAST MEDICAL & SURGICAL HISTORY:  COPD without exacerbation    CAD (coronary artery disease)    CVA (cerebrovascular accident)    Basal cell carcinoma    HLD (hyperlipidemia)    HTN (hypertension)    Anterolisthesis    Degeneration, intervertebral disc, cervical    History of tonsillectomy          FAMILY HISTORY:  FH: pancreatic cancer (Mother)  Father - pneumonia        Social Hx:  no smoking, no etoh    MEDICATIONS  (STANDING):  aspirin Suppository 300 milliGRAM(s) Rectal daily  atorvastatin 40 milliGRAM(s) Oral at bedtime  budesonide  80 MICROgram(s)/formoterol 4.5 MICROgram(s) Inhaler 2 Puff(s) Inhalation two times a day  cefepime  Injectable.      cholecalciferol 1000 Unit(s) Oral daily  diltiazem Infusion 5 mG/Hr (5 mL/Hr) IV Continuous <Continuous>  dorzolamide 2% Ophthalmic Solution 1 Drop(s) Both EYES three times a day  ferrous    sulfate 325 milliGRAM(s) Oral daily  heparin   Injectable 5000 Unit(s) SubCutaneous every 12 hours  latanoprost 0.005% Ophthalmic Solution 1 Drop(s) Both EYES at bedtime  polyethylene glycol 3350 17 Gram(s) Oral daily  sodium chloride 0.9%. 1000 milliLiter(s) (75 mL/Hr) IV Continuous <Continuous>       Allergies    No Known Allergies    Intolerances        ROS: Pertinent positives in HPI, all other ROS were reviewed and are negative.      Vital Signs Last 24 Hrs  T(C): 37.2 (24 Dec 2022 18:06), Max: 37.2 (24 Dec 2022 15:45)  T(F): 99 (24 Dec 2022 18:06), Max: 99 (24 Dec 2022 15:45)  HR: 135 (24 Dec 2022 19:50) (65 - 147)  BP: 108/67 (24 Dec 2022 19:50) (106/84 - 173/135)  BP(mean): 73 (24 Dec 2022 17:17) (73 - 91)  RR: 23 (24 Dec 2022 19:50) (20 - 28)  SpO2: 100% (24 Dec 2022 19:50) (88% - 100%)    Parameters below as of 24 Dec 2022 19:50  Patient On (Oxygen Delivery Method): nasal cannula  O2 Flow (L/min): 3          Constitutional: awake and alert.  HEENT: PERRLA, EOMI,   Neck: Supple.  Respiratory: Breath sounds are clear bilaterally  Cardiovascular: S1 and S2, regular / irregular rhythm  Gastrointestinal: soft, nontender  Extremities:  no edema  Musculoskeletal: no joint swelling/tenderness, no abnormal movements  Skin: No rashes    Neurological exam:  HF: Awake and alert, Appropriately interactive, normal affect. No definite aphasia. Speech fluent  CN: LEV, EOMI, VFF, facial sensation normal, left facial droop, marked dysarthria, tongue midline  Motor: Left upper extremity drift, mild weakness of left upper extremity, 5/5 right upper extremity, Weakness in b/l lower extremities   Sens:   Reflexes: Symmetric and normal . BJ trace, BR trace, KJ absent,  downgoing toes b/l  Coord:  No FNFA, dysmetria, unable to perform heel to shin  Gait/Balance: unable to test     NIHSS: 11 (some points due to chronic weakness)          Labs:   12-24    136  |  108  |  40<H>  ----------------------------<  155<H>  5.3   |  21<L>  |  1.38<H>    Ca    9.3      24 Dec 2022 14:00  Phos  4.0     12-24  Mg     2.3     12-24    TPro  7.5  /  Alb  2.7<L>  /  TBili  0.7  /  DBili  x   /  AST  51<H>  /  ALT  30  /  AlkPhos  101  12-24 12-22 Chol 174 LDL -- HDL 82 Trig 78                          14.4   15.44 )-----------( 305      ( 24 Dec 2022 14:00 )             43.0       Radiology:  CT head 12/24/22:  Age-appropriate involutional and ischemic gliotic changes. No   hemorrhage. Old right cerebellar infarct. No change since 12/21/2022.    CTA head and neck, CT perfusion 12/24/22:  Impression: CT perfusion demonstrates bilateral occipital areas of   delayed mean transit time without evidence of the core infarct. CTA of   the head and neck reveals no large vessel occlusions or significant   stenosis.

## 2022-12-24 NOTE — ED PROVIDER NOTE - OBJECTIVE STATEMENT
Detail Level: Zone
Detail Level: Detailed
Detail Level: Simple
89 y/o female with a PMHx of CAD, COPD, CVA, HLD, HTN, and basal cell carcinoma, anterolisthesis, degeneration of cervical intervertebral disc presents to the ED BIBA from [place] s/p family thought there was more slurred speech today. [facility] notes pt has had slurred speech for the past few days, family noticed possible facial droop today, per [place] pt looks like pictures from before, last known normal yesterday, [place] also notes pt in rapid Afib. code stroke called by Dr. Grijalva. Pt is also COVID positive as of 12/21/22. Pt was recently d/c from [facility] ED for generalized weakness. Dominga [facility] nurse # [phone].

## 2022-12-24 NOTE — ED ADULT TRIAGE NOTE - CHIEF COMPLAINT QUOTE
pt BIBA from [facility] d/t slurred speech. last known well 3 days ago. Pt just admitted to this nursing home 3 days ago & as per staff pt has been like this since arriving to NH. MD Valentine saldana pt in triage; No code stroke at this time. +COVID

## 2022-12-24 NOTE — PHARMACOTHERAPY INTERVENTION NOTE - COMMENTS
Medication history complete, reviewed medications with patient provided med list from [facility] and confirmed with doctor first med hx.

## 2022-12-24 NOTE — H&P ADULT - NSHPPHYSICALEXAM_GEN_ALL_CORE
Physical Exam: Vital Signs Last 24 Hrs  T(C): 37.2 (24 Dec 2022 18:06), Max: 37.2 (24 Dec 2022 15:45)  T(F): 99 (24 Dec 2022 18:06), Max: 99 (24 Dec 2022 15:45)  HR: 110 (24 Dec 2022 18:06) (65 - 147)  BP: 120/79 (24 Dec 2022 18:06) (106/84 - 173/135)  BP(mean): 73 (24 Dec 2022 17:17) (73 - 91)  RR: 20 (24 Dec 2022 18:06) (20 - 28)  SpO2: 98% (24 Dec 2022 18:06) (88% - 100%)    Parameters below as of 24 Dec 2022 18:06  Patient On (Oxygen Delivery Method): nasal cannula  O2 Flow (L/min): 3          HEENT: PRRL EOMI    MOUTH/TEETH/GUMS: Clear    NECK: no JVD    LUNGS: mild rhonchi     HEART: S1,S2 irregularly irregular     ABDOMEN: soft nontender    EXTREMITIES:  no pedal edema    MUSCULOSKELETAL: no joint swelling     NEURO:  slurred speech,  R side lower lip droop, partial weakness in left leg, left hand, left arm.    SKIN: no rash    : CVA negative,

## 2022-12-24 NOTE — H&P ADULT - NSHPLABSRESULTS_GEN_ALL_CORE
14.4   15.44 )-----------( 305      ( 24 Dec 2022 14:00 )             43.0       12-24    136  |  108  |  40<H>  ----------------------------<  155<H>  5.3   |  21<L>  |  1.38<H>    Ca    9.3      24 Dec 2022 14:00  Phos  4.0     12-24  Mg     2.3     12-24    TPro  7.5  /  Alb  2.7<L>  /  TBili  0.7  /  DBili  x   /  AST  51<H>  /  ALT  30  /  AlkPhos  101  12-24    CT pefusion: CT perfusion demonstrates bilateral occipital areas of   delayed mean transit time without evidence of the core infarct. CTA of   the head and neck reveals no large vessel occlusions or significant   stenosis.  CTH:  Age-appropriate involutional and ischemic gliotic changes. No   hemorrhage. Old right cerebellar infarct. No change since 12/21/2022.    Troponin 4870,   CXRay:   Cardiomegaly.  LEFT medial basilar airspace disease superimposed on calcified mitral   annulus.   EKG Atrial fib RVR.

## 2022-12-24 NOTE — ED PROVIDER NOTE - PROGRESS NOTE DETAILS
Jennifer Grider for attending Dr. Pacheco: Spoke with Dominga, nurse at [facility], states since 7:30AM this is the way pt has been. Pt is new to this nurse and she did not get sign out for any changes. Did notify Dr. Ramsay at about 1:30pm that pt heart rate was rapid, so plan was to control with Metoprolol and Eliquis, has not been given anticoagulation with Dr. Ramsay, unclear time of onset. Pt does appear to have true stroke, will also need rate control will discuss with tele stroke and pt family will require admission for further workup. Jennifer Grider for attending Dr. Pacheco: Spoke with tele-stroke, no LVO recommend allowing for blood pressure elevation so will be judicious with Diltiazem, recommend ASA 325mg, recommend against anticoagulation at this time. State no indication for interventional neurology, will admit for further workup.

## 2022-12-24 NOTE — ED ADULT NURSE NOTE - OBJECTIVE STATEMENT
pt BIBEMS c/o slurred speech x 3 days. pt newly admitted to SNF 3 days ago. per nursing staff at SNF pt always had slurred speech. today while talking on the phone with daughter at 11am, daughter noticed slurred speech. in ED left facial drop noted and slight left sided weakness. code stroke called and taken to ct scan. pt also in rapid afib in ED. 10mg Cardizem IVp administered. Troponin elevated, MD aware. pt denies pain.

## 2022-12-24 NOTE — H&P ADULT - NSHPREVIEWOFSYSTEMS_GEN_ALL_CORE
ROS: Limited secondary to slurred speech,    Eyes: no changes in vision  ENT/Mouth: no changes    Cardiovascular: no chest pain    Respiratory: no SOB    Gastrointestinal: no diarrhea, no nausea, no vomiting    Musculoskeletal: no pain    Integumentary: no itching    Neurological: slurred speech

## 2022-12-25 NOTE — CONSULT NOTE ADULT - SUBJECTIVE AND OBJECTIVE BOX
Patient is a 89y old  Female who presents with a chief complaint of CVA, MI, Atrial fib., (25 Dec 2022 12:12)    HPI:  HPI: The patient is an 89 yo female with Hx. of CAD, COPD, CVA R inferior cerebellar infarct, HLD, HTN, cervical spondylosis, Hospitalized in  on  for weakness sec. to COVID 19 infection , was discharged to [place] for CED on 22, admitted on  from [facility] for evaluation of slurred speech; upon admission was found to be in rapid afib; history per medical record as patient unable to provide history.         PMH: as above  PSH: as above  Meds: per reconciliation sheet, noted below  MEDICATIONS  (STANDING):  aspirin  chewable 81 milliGRAM(s) Oral daily  atorvastatin 40 milliGRAM(s) Oral at bedtime  budesonide  80 MICROgram(s)/formoterol 4.5 MICROgram(s) Inhaler 2 Puff(s) Inhalation two times a day  cefepime  Injectable.      cefepime  Injectable. 1000 milliGRAM(s) IV Push every 12 hours  cholecalciferol 1000 Unit(s) Oral daily  dexAMETHasone  Injectable 6 milliGRAM(s) IV Push daily  diltiazem Infusion 5 mG/Hr (5 mL/Hr) IV Continuous <Continuous>  dorzolamide 2% Ophthalmic Solution 1 Drop(s) Both EYES three times a day  ferrous    sulfate 325 milliGRAM(s) Oral daily  heparin  Infusion.  Unit(s)/Hr (14 mL/Hr) IV Continuous <Continuous>  latanoprost 0.005% Ophthalmic Solution 1 Drop(s) Both EYES at bedtime  polyethylene glycol 3350 17 Gram(s) Oral daily  sodium chloride 0.9%. 1000 milliLiter(s) (75 mL/Hr) IV Continuous <Continuous>    MEDICATIONS  (PRN):  acetaminophen     Tablet .. 650 milliGRAM(s) Oral every 6 hours PRN Temp greater or equal to 38C (100.4F), Mild Pain (1 - 3)  heparin   Injectable 6500 Unit(s) IV Push every 6 hours PRN For aPTT less than 40  heparin   Injectable 3000 Unit(s) IV Push every 6 hours PRN For aPTT between 40 - 57    Allergies    No Known Allergies    Intolerances      Social: no smoking, no alcohol, no illegal drugs; no recent travel, no exposure to TB  FAMILY HISTORY:  FH: pancreatic cancer (Mother)       ROS unable to obtain secondary to patient medical condition   Vital Signs Last 24 Hrs  T(C): 36.7 (25 Dec 2022 10:05), Max: 37.2 (24 Dec 2022 15:45)  T(F): 98.1 (25 Dec 2022 10:05), Max: 99 (24 Dec 2022 15:45)  HR: 99 (25 Dec 2022 10:05) (93 - 147)  BP: 116/59 (25 Dec 2022 10:05) (88/54 - 173/135)  BP(mean): 76 (25 Dec 2022 10:05) (66 - 106)  RR: 19 (25 Dec 2022 10:05) (18 - 28)  SpO2: 99% (25 Dec 2022 10:05) (88% - 100%)    Parameters below as of 25 Dec 2022 10:05  Patient On (Oxygen Delivery Method): room air      Daily     Daily     PE:    Constitutional: frail looking  HEENT: NC/AT, EOMI, PERRLA, conjunctivae clear; ears and nose atraumatic; pharynx clear  Neck: supple; thyroid not palpable  Back: no tenderness  Respiratory: respiratory effort normal; clear to auscultation  Cardiovascular: S1S2 regular, no murmurs  Abdomen: soft, not tender, not distended, positive BS; no liver or spleen organomegaly  Genitourinary: no suprapubic tenderness  Musculoskeletal: no muscle tenderness, no joint swelling or tenderness  Neurological/ Psychiatric:   moving all extremities  Skin: multiple various skin lesions including on anterior chest, right lower extremity    Labs: all available labs reviewed                        14.4   15.44 )-----------( 305      ( 24 Dec 2022 14:00 )             43.0     12-    139  |  111<H>  |  48<H>  ----------------------------<  126<H>  4.8   |  21<L>  |  1.59<H>    Ca    9.1      25 Dec 2022 07:45  Phos  4.0     12-24  Mg     2.3     12-24    TPro  7.5  /  Alb  2.7<L>  /  TBili  0.7  /  DBili  x   /  AST  51<H>  /  ALT  30  /  AlkPhos  101  12-24     LIVER FUNCTIONS - ( 24 Dec 2022 14:00 )  Alb: 2.7 g/dL / Pro: 7.5 gm/dL / ALK PHOS: 101 U/L / ALT: 30 U/L / AST: 51 U/L / GGT: x           Urinalysis Basic - ( 25 Dec 2022 06:05 )    Color: Yellow / Appearance: Clear / S.010 / pH: x  Gluc: x / Ketone: Negative  / Bili: Negative / Urobili: Negative   Blood: x / Protein: Negative / Nitrite: Negative   Leuk Esterase: Trace / RBC: 0-2 /HPF / WBC 6-10 /HPF   Sq Epi: x / Non Sq Epi: Occasional / Bacteria: Few      Flu With COVID-19 By DOROTHEA (22 @ 16:14)    SARS-CoV-2 Result: Detected: This Respiratory Panel uses polymerase chain reaction (PCR) to detect for  influenza A; influenza B; respiratory syncytial virus; and SARS-CoV-2.  This test was validated by [facility] and is in use under the FDA  Emergency Use Authorization (EUA) for clinical labs CLIA-certified to  perform high complexity testing. Test results should be correlated with  clinical presentation, patient history, and epidemiology.    Influenza A Result: NotDetec    Influenza B Result: NotDetec    Resp Syn Virus Result: NotDetec          < from: Xray Chest 1 View- PORTABLE-Urgent (22 @ 14:26) >  ACC: [id number] EXAM:  XR CHEST PORTABLE URGENT 1V                          PROCEDURE DATE:  2022          INTERPRETATION:  Portable chest radiograph    CLINICAL INFORMATION: Altered mental status    TECHNIQUE:  Portable  AP chest radiograph.    COMPARISON: 2022 chest x-ray .    FINDINGS:  CATHETERS AND TUBES: None    PULMONARY: LEFT medial basilar on airspace consolidation superimposed on   a calcified mitral annulus.  LEFT upper zones and RIGHT lung parenchyma clear. No pneumothorax.    HEART/VASCULAR: The  heart is enlarged in transverse diameter.    BONES: Visualized osseous structures are intact.    IMPRESSION:   Cardiomegaly.  LEFT medial basilar airspace disease superimposed on calcified mitral   annulus.    < end of copied text >            Radiology: all available radiological tests reviewed    Advanced directives addressed: full resuscitation

## 2022-12-25 NOTE — PROGRESS NOTE ADULT - ASSESSMENT
The patient is an 89 yo female with Hx. of CAD, COPD, CVA R inferior cerebellar infarct, HLD, HTN, cervical spondylosis, Hospitalized in  on 12/22 for weakness in the setting of  COVID 19 infection. She was discharged to [facility] for CED on 12/22/22. This morning she was sent to the ER for slurred speech. Her son reported that he had a video call with her at approximately 11 AM at which time he found that her speech was slurred and her voice was hoarse.   The ED physician spoke with a nurse at [facility] who stated that her speech was slurred upon awakening at 7:30 AM. Last known normal was unclear.   In the ED she was found to have atrial fibrillation and RVR.  Telestroke was consulted by the ED.  She was not a candidate for tpa due to no last known normal and did not have a large vessel occlusion seen on imaging.  It was recommended to hold off on anticoagulation for now and to allow for permissive hypertension.    Dysarthria, left facial droop, left arm weakness  -Likely acute stroke  -Was not a candidate for tpa  -In new afib  -MRI brain pending  -Symptoms now present for > 24 hours; can gradually optimize blood pressure  -Aspirin.   -Will made decision regarding AC after MRI  -Statin  -lipid panel was checked on 12/22  -Check HbA1C  -DVT prophylaxis  -Speech pathology  -PT  -Echo, will f/u on cardiology recs    Supportive care for covid-19 infection    Leukocytosis  -Ua negative  -CXR appreciated    Will follow

## 2022-12-25 NOTE — SWALLOW BEDSIDE ASSESSMENT ADULT - SWALLOW EVAL: SECRETION MANAGEMENT
Oral saliva management was mildly reduced on the left and strength of volitional cough was moderately decreased.

## 2022-12-25 NOTE — SWALLOW BEDSIDE ASSESSMENT ADULT - ADDITIONAL RECOMMENDATIONS
There are no preventive care reminders to display for this patient.    Patient is up to date, no discussion needed.             1) NEURO F/U    2) NUTRITION F/U

## 2022-12-25 NOTE — SWALLOW BEDSIDE ASSESSMENT ADULT - ASR SWALLOW LINGUAL MOBILITY
Lingual ROM/strength/agility mildly to moderately reduced on left > right. A left tongue drift was evident.

## 2022-12-25 NOTE — PROGRESS NOTE ADULT - SUBJECTIVE AND OBJECTIVE BOX
Reason for Admission: CVA, MI, Atrial fib.,  History of Present Illness:   HPI: The patient is an 89 yo female with Hx. of CAD, COPD, CVA R inferior cerebellar infarct, HLD, HTN, cervical spondylosis, Hospitalized in  on  for weakness sec. to COVID 19 infection , was discharged to [place] for CED on 22, was sent to the ER this am for slurred speech. The patient son states he did a facetime video with her around 11 am and found her to have slurred speech and hoarse voice.  In the ER she was found with new onset Atrial fib. with RVR , Left sided weakness,  was treated with IV Cardizem by ER MD. Low Bp treated with IVNS.  Troponin 4870,     REVIEW OF SYSTEMS:  unable to understand patient /  speech is slurred    Physical Exam:   GENERAL APPEARANCE:  Patient looks sick /  unable to understand her speech /  very deconditioned and frail  T(C): 36.7 (22 @ 10:05), Max: 37.2 (22 @ 15:45)  HR: 99 (22 @ 10:05) (65 - 147)  BP: 116/59 (22 @ 10:05) (88/54 - 173/135)  RR: 19 (22 @ 10:05) (18 - 28)  SpO2: 99% (22 @ 10:05) (88% - 100%)  Wt(kg): --  HEENT:  Head is normocephalic    Skin:  Warm and dry without any rash   NECK:  Supple without lymphadenopathy.   HEART:  Regular rate and rhythm. normal S1 and S2, No M/R/G  LUNGS:  Good ins/exp effort, no W/R/R/C  ABDOMEN:  Soft, nontender, nondistended with good bowel sounds heard  EXTREMITIES:  Without cyanosis, clubbing or edema.   NEUROLOGICAL:  significant debility /  dysarthria / (L) facial droop /  (L) upper extremity qweakness/  b/l le generalized weakness.     Labs:   CBC Full  -  ( 24 Dec 2022 14:00 )  WBC Count : 15.44 K/uL  RBC Count : 4.83 M/uL  Hemoglobin : 14.4 g/dL  Hematocrit : 43.0 %  Platelet Count - Automated : 305 K/uL  Mean Cell Volume : 89.0 fl  Mean Cell Hemoglobin : 29.8 pg  Mean Cell Hemoglobin Concentration : 33.5 gm/dL  Auto Neutrophil # : 12.52 K/uL  Auto Lymphocyte # : 1.68 K/uL  Auto Monocyte # : 1.12 K/uL  Auto Eosinophil # : 0.02 K/uL  Auto Basophil # : 0.04 K/uL  Auto Neutrophil % : 81.0 %  Auto Lymphocyte % : 10.9 %  Auto Monocyte % : 7.3 %  Auto Eosinophil % : 0.1 %  Auto Basophil % : 0.3 %    PT/INR - ( 24 Dec 2022 14:00 )   PT: 14.0 sec;   INR: 1.20 ratio         PTT - ( 24 Dec 2022 14:00 )  PTT:32.4 sec  Urinalysis Basic - ( 25 Dec 2022 06:05 )    Color: Yellow / Appearance: Clear / S.010 / pH: x  Gluc: x / Ketone: Negative  / Bili: Negative / Urobili: Negative   Blood: x / Protein: Negative / Nitrite: Negative   Leuk Esterase: Trace / RBC: 0-2 /HPF / WBC 6-10 /HPF   Sq Epi: x / Non Sq Epi: Occasional / Bacteria: Few          139  |  111<H>  |  48<H>  ----------------------------<  126<H>  4.8   |  21<L>  |  1.59<H>    Ca    9.1      25 Dec 2022 07:45  Phos  4.0     12  Mg     2.3         TPro  7.5  /  Alb  2.7<L>  /  TBili  0.7  /  DBili  x   /  AST  51<H>  /  ALT  30  /  AlkPhos  101          # Atrial fibrillation with RVR  - on Cardizem 15  - started on BB therapy  - IV heparin  - cardiology following    # Dysarthria, left facial droop, left arm weakness -  r/o CVA  - not a candidate for TPA  - MRI brain:  Subacute infarct suspected involving the high right posterior frontal cortical/subcortical region.  - ASA / STAT  - DVT prophylaxis  - Speech pathology  - PT    # Supportive care for covid-19 infection  - initiate remedies /  decadron as patient is on O2.   - supportive care    # Leukocytosis  - Ua negative  - CXR appreciated       Reason for Admission: CVA, MI, Atrial fib.,  History of Present Illness:   HPI: The patient is an 89 yo female with Hx. of CAD, COPD, CVA R inferior cerebellar infarct, HLD, HTN, cervical spondylosis, Hospitalized in  on  for weakness sec. to COVID 19 infection , was discharged to [place] for CED on 22, was sent to the ER this am for slurred speech. The patient son states he did a facetime video with her around 11 am and found her to have slurred speech and hoarse voice.  In the ER she was found with new onset Atrial fib. with RVR , Left sided weakness,  was treated with IV Cardizem by ER MD. Low Bp treated with IVNS.  Troponin 4870    Patient is very deocnditioned, frail. Sick apeparing. poor historian.    REVIEW OF SYSTEMS:  unable to understand patient /  speech is slurred    Physical Exam:   GENERAL APPEARANCE:  Patient looks sick /  unable to understand her speech /  very deconditioned and frail  T(C): 36.7 (22 @ 10:05), Max: 37.2 (22 @ 15:45)  HR: 99 (22 @ 10:05) (65 - 147)  BP: 116/59 (22 @ 10:05) (88/54 - 173/135)  RR: 19 (22 @ 10:05) (18 - 28)  SpO2: 99% (22 @ 10:05) (88% - 100%)  HEENT:  Head is normocephalic    Skin:  Warm and dry without any rash   NECK:  Supple without lymphadenopathy.   HEART:  Regular rate and rhythm. normal S1 and S2, No M/R/G  LUNGS:  Good ins/exp effort, no W/R/R/C  ABDOMEN:  Soft, nontender, nondistended with good bowel sounds heard  EXTREMITIES:  Without cyanosis, clubbing or edema.   NEUROLOGICAL:  significant debility /  dysarthria / (L) facial droop /  (L) upper extremity qweakness/  b/l le generalized weakness.     Labs:   CBC Full  -  ( 24 Dec 2022 14:00 )  WBC Count : 15.44 K/uL  RBC Count : 4.83 M/uL  Hemoglobin : 14.4 g/dL  Hematocrit : 43.0 %  Platelet Count - Automated : 305 K/uL  Mean Cell Volume : 89.0 fl  Mean Cell Hemoglobin : 29.8 pg  Mean Cell Hemoglobin Concentration : 33.5 gm/dL  Auto Neutrophil # : 12.52 K/uL  Auto Lymphocyte # : 1.68 K/uL  Auto Monocyte # : 1.12 K/uL  Auto Eosinophil # : 0.02 K/uL  Auto Basophil # : 0.04 K/uL  Auto Neutrophil % : 81.0 %  Auto Lymphocyte % : 10.9 %  Auto Monocyte % : 7.3 %  Auto Eosinophil % : 0.1 %  Auto Basophil % : 0.3 %    PT/INR - ( 24 Dec 2022 14:00 )   PT: 14.0 sec;   INR: 1.20 ratio         PTT - ( 24 Dec 2022 14:00 )  PTT:32.4 sec  Urinalysis Basic - ( 25 Dec 2022 06:05 )    Color: Yellow / Appearance: Clear / S.010 / pH: x  Gluc: x / Ketone: Negative  / Bili: Negative / Urobili: Negative   Blood: x / Protein: Negative / Nitrite: Negative   Leuk Esterase: Trace / RBC: 0-2 /HPF / WBC 6-10 /HPF   Sq Epi: x / Non Sq Epi: Occasional / Bacteria: Few          139  |  111<H>  |  48<H>  ----------------------------<  126<H>  4.8   |  21<L>  |  1.59<H>    Ca    9.1      25 Dec 2022 07:45  Phos  4.0       Mg     2.3         TPro  7.5  /  Alb  2.7<L>  /  TBili  0.7  /  DBili  x   /  AST  51<H>  /  ALT  30  /  AlkPhos  101          # Atrial fibrillation with RVR  - on Cardizem 15  - started on BB therapy  - IV heparin  - cardiology following    # Dysarthria, left facial droop, left arm weakness -  r/o CVA  - not a candidate for TPA  - MRI brain:  Subacute infarct suspected involving the high right posterior frontal cortical/subcortical region.  - ASA / STAT  - DVT prophylaxis  - Speech pathology  - PT    # Supportive care for covid-19 infection  - initiate remedies /  decadron as patient is on O2.   - supportive care    # Leukocytosis  - Ua negative  - CXR appreciated       Reason for Admission: CVA, MI, Atrial fib.,  History of Present Illness:   HPI: The patient is an 89 yo female with Hx. of CAD, COPD, CVA R inferior cerebellar infarct, HLD, HTN, cervical spondylosis, Hospitalized in  on  for weakness sec. to COVID 19 infection , was discharged to [place] for CED on 22, was sent to the ER this am for slurred speech. The patient son states he did a facetime video with her around 11 am and found her to have slurred speech and hoarse voice.  In the ER she was found with new onset Atrial fib. with RVR , Left sided weakness,  was treated with IV Cardizem by ER MD. Low Bp treated with IVNS.  Troponin 4870    Patient is very deocnditioned, frail. Sick apeparing. poor historian.    REVIEW OF SYSTEMS:  unable to understand patient /  speech is slurred    Physical Exam:   GENERAL APPEARANCE:  Patient looks sick /  unable to understand her speech /  very deconditioned and frail  T(C): 36.7 (22 @ 10:05), Max: 37.2 (22 @ 15:45)  HR: 99 (22 @ 10:05) (65 - 147)  BP: 116/59 (22 @ 10:05) (88/54 - 173/135)  RR: 19 (22 @ 10:05) (18 - 28)  SpO2: 99% (22 @ 10:05) (88% - 100%)  HEENT:  Head is normocephalic    Skin:  Warm and dry without any rash   NECK:  Supple without lymphadenopathy.   HEART:  Regular rate and rhythm. normal S1 and S2, No M/R/G  LUNGS:  Good ins/exp effort, no W/R/R/C  ABDOMEN:  Soft, nontender, nondistended with good bowel sounds heard  EXTREMITIES:  Without cyanosis, clubbing or edema.   NEUROLOGICAL:  significant debility /  dysarthria / (L) facial droop /  (L) upper extremity qweakness/  b/l le generalized weakness.     Labs:   CBC Full  -  ( 24 Dec 2022 14:00 )  WBC Count : 15.44 K/uL  RBC Count : 4.83 M/uL  Hemoglobin : 14.4 g/dL  Hematocrit : 43.0 %  Platelet Count - Automated : 305 K/uL  Mean Cell Volume : 89.0 fl  Mean Cell Hemoglobin : 29.8 pg  Mean Cell Hemoglobin Concentration : 33.5 gm/dL  Auto Neutrophil # : 12.52 K/uL  Auto Lymphocyte # : 1.68 K/uL  Auto Monocyte # : 1.12 K/uL  Auto Eosinophil # : 0.02 K/uL  Auto Basophil # : 0.04 K/uL  Auto Neutrophil % : 81.0 %  Auto Lymphocyte % : 10.9 %  Auto Monocyte % : 7.3 %  Auto Eosinophil % : 0.1 %  Auto Basophil % : 0.3 %    PT/INR - ( 24 Dec 2022 14:00 )   PT: 14.0 sec;   INR: 1.20 ratio         PTT - ( 24 Dec 2022 14:00 )  PTT:32.4 sec  Urinalysis Basic - ( 25 Dec 2022 06:05 )    Color: Yellow / Appearance: Clear / S.010 / pH: x  Gluc: x / Ketone: Negative  / Bili: Negative / Urobili: Negative   Blood: x / Protein: Negative / Nitrite: Negative   Leuk Esterase: Trace / RBC: 0-2 /HPF / WBC 6-10 /HPF   Sq Epi: x / Non Sq Epi: Occasional / Bacteria: Few          139  |  111<H>  |  48<H>  ----------------------------<  126<H>  4.8   |  21<L>  |  1.59<H>    Ca    9.1      25 Dec 2022 07:45  Phos  4.0       Mg     2.3         TPro  7.5  /  Alb  2.7<L>  /  TBili  0.7  /  DBili  x   /  AST  51<H>  /  ALT  30  /  AlkPhos  101          # Atrial fibrillation with RVR  - on Cardizem 15  - started on BB therapy  - IV heparin  - cardiology following    # Subacute CVA  - not a candidate for TPA  - MRI brain:  Subacute infarct suspected involving the high right posterior frontal cortical/subcortical region.  - ASA / STAT  - DVT prophylaxis  - Speech pathology  - PT    # Supportive care for covid-19 infection  - initiate remedies /  decadron as patient is on O2.   - supportive care    # Leukocytosis  - Ua negative  - CXR appreciated

## 2022-12-25 NOTE — CONSULT NOTE ADULT - PROBLEM SELECTOR RECOMMENDATION 3
acute , ecotrin ,station ,  likely cerebrovascular disease , and can not rule out embolic due to atrial fibrillation   will need chronic anticoagulation once cleared by neurologist , continue statin ecotrin

## 2022-12-25 NOTE — SWALLOW BEDSIDE ASSESSMENT ADULT - SWALLOW EVAL: RECOMMENDED DIET
SUGGEST A PUREE TEXTURE DIET WITH MODERATELY THICK LIQUIDS AS THIS IS THE LEAST RESTRICTIVE TOLERABLE DIET CONSISTENCIES FROM AN OROPHARYNGEAL SWALLOWING PERSPECTIVE ON EXAM. CEASE PO IF ASPIRATION SIGNS NOTED.

## 2022-12-25 NOTE — CONSULT NOTE ADULT - PROBLEM SELECTOR RECOMMENDATION 2
likely demand related ischemia , no chest pain , in the setting afib with rapid rate, ecotrin statin , lopressor , will obtain echo to assess ventricular function ,

## 2022-12-25 NOTE — SWALLOW BEDSIDE ASSESSMENT ADULT - SWALLOW EVAL: PROGNOSIS
1) Pt exhibits neurogenic Oropharyngeal Dysphagia in setting of new CVA in high right posterior Frontal cortical/subcortical regions as well as old stroke in right Cerebellum. Note that pt also with COVID/leukocytosis/rapid A-Fib which may also contribute to her medical deconditioning which can negatively impact deglutition as well.

## 2022-12-25 NOTE — SWALLOW BEDSIDE ASSESSMENT ADULT - COMMENTS
Pt was admitted to  from [place] with slurred speech and left facial droop. Brain MRI notable for new infarct in high right posterior Frontal cortical/subcortical regions. Pt also with encephalomalacia in right cerebellar region due to an old stroke. Other acute medical issues include COVID, leukocytosis, and rapid A-Fib. This profile is superimposed upon a history of CAD, COPD, HTN, HLD, cervical spondylosis, previous Basal Cell skin cancer removal, prior right cerebellar stroke NOS, and past tonsillectomy.

## 2022-12-25 NOTE — SWALLOW BEDSIDE ASSESSMENT ADULT - PHARYNGEAL PHASE
Swallow trigger was timely to mildly latent. Laryngeal lift on palpation during swallowing trials was moderately+ reduced but felt to be grossly functional with some of the above modified food consistencies. Post prandial coughing exhibited with mildly thick liquids, despite cues to employ compensatory swallowing maneuvers. NO behavioral aspiration signs exhibited with puree foods and moderately thick liquids.

## 2022-12-25 NOTE — CONSULT NOTE ADULT - SUBJECTIVE AND OBJECTIVE BOX
CHIEF COMPLAINT:    HPI:  HPI: The patient is an 89 yo female with Hx. of CAD, COPD, CVA R inferior cerebellar infarct, HLD, HTN, degenerative valvular disease , cervical spondylosis, Hospitalized in  on 12/22 for weakness sec. to COVID 19 infection , was discharged to [place] for CED on 12/22/22, was sent to the ER this am for slurred speech. The patient son states he did a face time video with her around 11 am and found her to have slurred speech and hoarse voice.  In the ER she was found with new onset Atrial fib. with RVR , Left sided weakness,  was treated with IV Cardizem by ER MD. Low Bp treated with IVNS.  Troponin 4870,   Patient denies any chest pain or shortness of breath or dizziness     patient apparently found her on floor on last wednesday  was very weakness , diagnosed to covid , transferred to rehab  , where developed these symptoms           PAST MEDICAL & SURGICAL HISTORY:  COPD without exacerbation      CAD (coronary artery disease)      CVA (cerebrovascular accident)   CVA R inferior cerebellar infarct    Basal cell carcinoma      HLD (hyperlipidemia)      HTN (hypertension)      Anterolisthesis      Degeneration, intervertebral disc, cervical      History of tonsillectomy          Allergies    No Known Allergies    Intolerances        SOCIAL HISTORY:     never smoker       FAMILY HISTORY:  FH: pancreatic cancer (Mother)        MEDICATIONS:Home Medications:  Advair HFA 45 mcg-21 mcg/inh inhalation aerosol: 2 puff(s) inhaled 2 times a day (24 Dec 2022 16:52)  ATORVASTATIN 40 MG TABLET: 1 each orally once a day (24 Dec 2022 16:52)  CLOPIDOGREL 75 MG TABLET: take 1 tablet by mouth once daily (24 Dec 2022 16:52)  DORZOLAMIDE HCL 2% EYE DROPS: instill 1 drop into both eyes twice a day (24 Dec 2022 16:52)  ENALAPRIL 10MG TAB: 1 tab(s) orally once a day (24 Dec 2022 16:52)  ferrous sulfate 325 mg (65 mg elemental iron) oral tablet: 1 tab(s) orally once a day (24 Dec 2022 16:52)  Fish Oil 1000 mg oral capsule: 2 cap(s) orally 2 times a day (24 Dec 2022 16:52)  FUROSEMIDE 20 MG TABLET: take 1 tablet by mouth once daily (24 Dec 2022 16:52)  Metoprolol Tartrate 25 mg oral tablet: 1 tab(s) orally once a day (in the evening) (24 Dec 2022 16:52)  Multiple Vitamins oral tablet: 1 tab(s) orally once a day (24 Dec 2022 16:52)  polyethylene glycol 3350 oral powder for reconstitution: 17 gram(s) orally once a day (24 Dec 2022 16:52)  POT CHLORIDE 10MEQ ER TAB: 1 tab(s) orally once a day (24 Dec 2022 16:52)  Silvadene 1% topical cream: Apply topically to affected area 2 times a day [Left leg] (24 Dec 2022 16:52)  travoprost 0.004% ophthalmic solution: 1 drop(s) to each affected eye once a day (in the evening) (24 Dec 2022 16:52)  Tylenol 8 HR Arthritis Pain 650 mg oral tablet, extended release: 1 tab(s) orally once a day (at bedtime) (24 Dec 2022 16:52)  Vitamin D3 25 mcg (1000 intl units) oral tablet: 1 tab(s) orally once a day (24 Dec 2022 16:52)    MEDICATIONS  (STANDING):  aspirin Suppository 300 milliGRAM(s) Rectal daily  atorvastatin 40 milliGRAM(s) Oral at bedtime  budesonide  80 MICROgram(s)/formoterol 4.5 MICROgram(s) Inhaler 2 Puff(s) Inhalation two times a day  cefepime  Injectable.      cefepime  Injectable. 1000 milliGRAM(s) IV Push every 12 hours  cholecalciferol 1000 Unit(s) Oral daily  clopidogrel Tablet 75 milliGRAM(s) Oral daily  diltiazem Infusion 5 mG/Hr (5 mL/Hr) IV Continuous <Continuous>  dorzolamide 2% Ophthalmic Solution 1 Drop(s) Both EYES three times a day  ferrous    sulfate 325 milliGRAM(s) Oral daily  heparin   Injectable 5000 Unit(s) SubCutaneous every 12 hours  latanoprost 0.005% Ophthalmic Solution 1 Drop(s) Both EYES at bedtime  polyethylene glycol 3350 17 Gram(s) Oral daily  sodium chloride 0.9%. 1000 milliLiter(s) (75 mL/Hr) IV Continuous <Continuous>    MEDICATIONS  (PRN):  acetaminophen     Tablet .. 650 milliGRAM(s) Oral every 6 hours PRN Temp greater or equal to 38C (100.4F), Mild Pain (1 - 3)      REVIEW OF SYSTEMS:  limited    +weakness, fevers or chills  EYES/ENT: No visual changes;  No vertigo or throat pain   NECK: No pain or stiffness  RESPIRATORY: No cough, wheezing, hemoptysis; No shortness of breath  CARDIOVASCULAR: No chest pain or palpitations  GASTROINTESTINAL: No abdominal or epigastric pain. No nausea, vomiting, or hematemesis; No diarrhea or constipation. No melena or hematochezia.  GENITOURINARY: No dysuria, frequency or hematuria  NEUROLOGICAL: as above   SKIN: No itching, burning, rashes, or lesions   All other review of systems is negative unless indicated above    Vital Signs Last 24 Hrs  T(C): 36.7 (25 Dec 2022 10:05), Max: 37.2 (24 Dec 2022 15:45)  T(F): 98.1 (25 Dec 2022 10:05), Max: 99 (24 Dec 2022 15:45)  HR: 99 (25 Dec 2022 10:05) (65 - 147)  BP: 116/59 (25 Dec 2022 10:05) (88/54 - 173/135)  BP(mean): 76 (25 Dec 2022 10:05) (66 - 106)  RR: 19 (25 Dec 2022 10:05) (18 - 28)  SpO2: 99% (25 Dec 2022 10:05) (88% - 100%)    Parameters below as of 25 Dec 2022 10:05  Patient On (Oxygen Delivery Method): room air        I&O's Summary    24 Dec 2022 07:01  -  25 Dec 2022 07:00  --------------------------------------------------------  IN: 0 mL / OUT: 400 mL / NET: -400 mL        PHYSICAL EXAM:    Constitutional: NAD, awake and alert, well-developed  HEENT: PERR, EOMI,  No oral cyananosis.  Neck:  supple,  No JVD  Respiratory: Breath sounds are clear bilaterally, No wheezing, rales or rhonchi  Cardiovascular: S1 and S2, IR IR   Gastrointestinal: Bowel Sounds present, soft, nontender.   Extremities: No peripheral edema. No clubbing or cyanosis.  Vascular: 2+ peripheral pulses  Neurological: A/O x 3, moves all extremities   Musculoskeletal: no calf tenderness.  Skin: No rashes.      LABS: All Labs Reviewed:                        14.4   15.44 )-----------( 305      ( 24 Dec 2022 14:00 )             43.0     25 Dec 2022 07:45    139    |  111    |  48     ----------------------------<  126    4.8     |  21     |  1.59   24 Dec 2022 14:00    136    |  108    |  40     ----------------------------<  155    5.3     |  21     |  1.38     Ca    9.1        25 Dec 2022 07:45  Ca    9.3        24 Dec 2022 14:00  Phos  4.0       24 Dec 2022 14:00  Mg     2.3       24 Dec 2022 14:00    TPro  7.5    /  Alb  2.7    /  TBili  0.7    /  DBili  x      /  AST  51     /  ALT  30     /  AlkPhos  101    24 Dec 2022 14:00    PT/INR - ( 24 Dec 2022 14:00 )   PT: 14.0 sec;   INR: 1.20 ratio         PTT - ( 24 Dec 2022 14:00 )  PTT:32.4 sec      Organism --  Gram Stain Blood -- Gram Stain --  Specimen Source .Blood None  Culture-Blood --    Organism --  Gram Stain Blood -- Gram Stain --  Specimen Source .Blood Blood-Peripheral  Culture-Blood --    Organism --  Gram Stain Blood -- Gram Stain --  Specimen Source Clean Catch Clean Catch (Midstream)  Culture-Blood --  < from: MR Head No Cont (12.25.22 @ 10:34) >  IMPRESSION: Subacute infarct suspected involving the high right posterior   frontal cortical/subcortical region.    < end of copied text >      Blood Culture: Organism --  Gram Stain Blood -- Gram Stain --  Specimen Source .Blood None  Culture-Blood --    Organism --  Gram Stain Blood -- Gram Stain --  Specimen Source .Blood Blood-Peripheral  Culture-Blood --    Organism --  Gram Stain Blood -- Gram Stain --  Specimen Source Clean Catch Clean Catch (Midstream)  Culture-Blood --        12-24 @ 14:00  TSH: 0.71      RADIOLOGY/EKG:< from: 12 Lead ECG (12.21.22 @ 18:16) >  Normal sinus rhythm  Nonspecific ST and T wave abnormality  Abnormal ECG  When compared with ECG of 22-DEC-2021 06:34,  Nonspecific T wave abnormality now evident in Lateral leads  Confirmed by Simeon Alfred MD ([id number]) on 12/22/2022 8:41:45 AM    < end of copied text >    < from: TTE Echo Complete w/o Contrast w/ Doppler (12.15.21 @ 14:49) >     Severe mitral annular calcification is present.   Severe mitral stenosis is present.   Mild (1+) mitral regurgitation is present.   The aortic valve is not well visualized, appears mildly sclerotic. Valve   opening seems to be restricted.   Mild to moderate aortic stenosis is present.   Trace tricuspid valve regurgitation is present.   Pulmonic valve not well seen, probably normal pulmonic valve function.   Trace pulmonic valvular regurgitation is present.   The left atrium is moderately dilated.   Endocardium is not well visualized, however, overall left ventricular   systolic function appears normal as seen in limited views. . Technically   Difficult Study.   Estimated left ventricular ejection fraction is 55 %.   Mild concentric left ventricular hypertrophy is present.     Signature    < end of copied text >

## 2022-12-25 NOTE — SWALLOW BEDSIDE ASSESSMENT ADULT - ORAL PHASE
Bolus formation/transfer were moderately prolonged and discontinuous but felt to be grossly mechanically functional with some of the above modified food consistencies. Piecemeal deglutition was evident. Mild tongue debris noted on left > right with puree food.

## 2022-12-25 NOTE — PROGRESS NOTE ADULT - SUBJECTIVE AND OBJECTIVE BOX
Interval History:  22: She does not report any new complaints.    MEDICATIONS  (STANDING):  aspirin Suppository 300 milliGRAM(s) Rectal daily  atorvastatin 40 milliGRAM(s) Oral at bedtime  budesonide  80 MICROgram(s)/formoterol 4.5 MICROgram(s) Inhaler 2 Puff(s) Inhalation two times a day  cefepime  Injectable.      cefepime  Injectable. 1000 milliGRAM(s) IV Push every 12 hours  cholecalciferol 1000 Unit(s) Oral daily  clopidogrel Tablet 75 milliGRAM(s) Oral daily  diltiazem Infusion 5 mG/Hr (5 mL/Hr) IV Continuous <Continuous>  dorzolamide 2% Ophthalmic Solution 1 Drop(s) Both EYES three times a day  ferrous    sulfate 325 milliGRAM(s) Oral daily  heparin   Injectable 5000 Unit(s) SubCutaneous every 12 hours  latanoprost 0.005% Ophthalmic Solution 1 Drop(s) Both EYES at bedtime  polyethylene glycol 3350 17 Gram(s) Oral daily  sodium chloride 0.9%. 1000 milliLiter(s) (75 mL/Hr) IV Continuous <Continuous>    MEDICATIONS  (PRN):  acetaminophen     Tablet .. 650 milliGRAM(s) Oral every 6 hours PRN Temp greater or equal to 38C (100.4F), Mild Pain (1 - 3)      Allergies    No Known Allergies    Intolerances        PHYSICAL EXAM:  Vital Signs Last 24 Hrs  T(F): 98.1 (22 @ 10:05)  HR: 99 (22 @ 10:05)  BP: 116/59 (22 @ 10:05)  RR: 19 (22 @ 10:05)    GENERAL: NAD, well-groomed  HEAD:  Atraumatic, Normocephalic  Neuro:  Awake, alert, able to repeat simple sentences  CN: PERRL, EOMI, no nystagmus, mild left facial weakness, + dysarthria  motor: Some restricted range of motion in right shoulder otherwise strength intact in upper extremities  Weakness in b/l lower extremities  sensory: intact to light touch  gait: not tested    LABS:                        14.4   15.44 )-----------( 305      ( 24 Dec 2022 14:00 )             43.0         139  |  111<H>  |  48<H>  ----------------------------<  126<H>  4.8   |  21<L>  |  1.59<H>    Ca    9.1      25 Dec 2022 07:45  Phos  4.0       Mg     2.3         TPro  7.5  /  Alb  2.7<L>  /  TBili  0.7  /  DBili  x   /  AST  51<H>  /  ALT  30  /  AlkPhos  101      PT/INR - ( 24 Dec 2022 14:00 )   PT: 14.0 sec;   INR: 1.20 ratio         PTT - ( 24 Dec 2022 14:00 )  PTT:32.4 sec  Urinalysis Basic - ( 25 Dec 2022 06:05 )    Color: Yellow / Appearance: Clear / S.010 / pH: x  Gluc: x / Ketone: Negative  / Bili: Negative / Urobili: Negative   Blood: x / Protein: Negative / Nitrite: Negative   Leuk Esterase: Trace / RBC: 0-2 /HPF / WBC 6-10 /HPF   Sq Epi: x / Non Sq Epi: Occasional / Bacteria: Few        RADIOLOGY & ADDITIONAL STUDIES:    CT head 22:  Age-appropriate involutional and ischemic gliotic changes. No   hemorrhage. Old right cerebellar infarct. No change since 2022.    CTA head and neck, CT perfusion 22:  Impression: CT perfusion demonstrates bilateral occipital areas of   delayed mean transit time without evidence of the core infarct. CTA of   the head and neck reveals no large vessel occlusions or significant   stenosis.

## 2022-12-25 NOTE — SWALLOW BEDSIDE ASSESSMENT ADULT - ASR SWALLOW LABIAL MOBILITY
Labial ROM/strength/agility mildly to moderately reduced on left > right. A left lip lag was evident.

## 2022-12-25 NOTE — CONSULT NOTE ADULT - PROBLEM SELECTOR RECOMMENDATION 6
degenerative mitral valve severe mitral stenosis ,   mild to moderate aortic stenosis , follow up echo

## 2022-12-25 NOTE — SWALLOW BEDSIDE ASSESSMENT ADULT - ASR SWALLOW RECOMMEND DIAG
DEFER MBS AS PT APPEARED CLINICALLY TOLERANT OF SUGGESTED PO TEXTURES FROM AN OROPHARYNGEAL SWALLOWING STANCE, DYSPHAGIA WITH A PROPENSITY TO FLUCTUATE IN SEVERITY GIVEN PROFILE AND CONSIDERING PT'S REDUCED STIMULABILITY FOR EFFECTIVE USE OF COMPENSATORY SWALLOWING MANEUVERS.

## 2022-12-25 NOTE — SWALLOW BEDSIDE ASSESSMENT ADULT - ORAL PREPARATORY PHASE
Pt willingly accepted PO. Oral aperture was decreased when accepting PO. Labial grading on utensils was functionally decreased on left.

## 2022-12-25 NOTE — CONSULT NOTE ADULT - PROBLEM SELECTOR RECOMMENDATION 9
New onset  with rapid rate , dehydration ,  likely underlying ASHD with acute covid infection ,  continue IV fluid , cardizem drip ,  will give lopressor 25 mg po BID  , ecotrin , will start on anticoagulation when ok with  neurology    IV heparin then warfarin as patient mitral stenosis

## 2022-12-25 NOTE — SWALLOW BEDSIDE ASSESSMENT ADULT - SLP GENERAL OBSERVATIONS
On encounter, a mild left lip lag was evident. Audible upper airway congestion noted. The pt was alert. She was interactive. Pt was able to verbalize during communicative probes via linguistically intact utterances without evidence of a primary linguistic pathology. However, her speech output was marked by articulatory slurring, slowed speech rate and vocal raspiness/decreased vocal intensity consistent with Dysarthria. Dysarthria in setting of new CVA in high right posterior Frontal cortical/subcortical regions as well as old stroke in right Cerebellum.

## 2022-12-25 NOTE — CHART NOTE - NSCHARTNOTEFT_GEN_A_CORE
notified by nurse for 3 episodes of loose/ watery stool, pt A&Ox1,unable to ask for other symptoms.     C. diff toxin, precaution ordered.

## 2022-12-25 NOTE — SWALLOW BEDSIDE ASSESSMENT ADULT - SWALLOW EVAL: DIAGNOSIS
1) On encounter, a mild left lip lag was evident. Audible upper airway congestion noted. The pt was alert. She was interactive. Pt was able to verbalize during communicative probes via linguistically intact utterances without evidence of a primary linguistic pathology. However, her speech output was marked by articulatory slurring, slowed speech rate and vocal raspiness/decreased vocal intensity consistent with Dysarthria. Dysarthria in setting of new CVA in high right posterior Frontal cortical/subcortical regions as well as old stroke in right Cerebellum.

## 2022-12-26 NOTE — PROGRESS NOTE ADULT - SUBJECTIVE AND OBJECTIVE BOX
REASON FOR VISIT: AF    HPI:  89 year old woman with a history of CAD, COPD, CVA, HLD, HTN, severe mitral stenosis, recent COVID-19 infection admitted with subacute CVA and a new diagnosis of AF.    12/26/22.  No new complaints.    MEDICATIONS  (STANDING):  aspirin  chewable 81 milliGRAM(s) Oral daily  atorvastatin 40 milliGRAM(s) Oral at bedtime  budesonide  80 MICROgram(s)/formoterol 4.5 MICROgram(s) Inhaler 2 Puff(s) Inhalation two times a day  cefepime  Injectable. 1000 milliGRAM(s) IV Push every 24 hours  cholecalciferol 1000 Unit(s) Oral daily  dexAMETHasone  Injectable 6 milliGRAM(s) IV Push daily  diltiazem Infusion 5 mG/Hr (5 mL/Hr) IV Continuous <Continuous>  dorzolamide 2% Ophthalmic Solution 1 Drop(s) Both EYES three times a day  ferrous    sulfate 325 milliGRAM(s) Oral daily  heparin  Infusion.  Unit(s)/Hr (14 mL/Hr) IV Continuous <Continuous>  latanoprost 0.005% Ophthalmic Solution 1 Drop(s) Both EYES at bedtime  polyethylene glycol 3350 17 Gram(s) Oral daily  sodium chloride 0.9%. 1000 milliLiter(s) (75 mL/Hr) IV Continuous <Continuous>    Vital Signs Last 24 Hrs  T(C): 36.2 (26 Dec 2022 08:56), Max: 36.7 (25 Dec 2022 12:24)  T(F): 97.2 (26 Dec 2022 08:56), Max: 98.1 (25 Dec 2022 12:24)  HR: 125 (26 Dec 2022 08:56) (84 - 129)  BP: 104/62 (26 Dec 2022 08:56) (104/62 - 120/92)  BP(mean): 83 (26 Dec 2022 04:54) (75 - 103)  RR: 18 (26 Dec 2022 08:56) (18 - 26)  SpO2: 100% (26 Dec 2022 08:56) (95% - 100%)    PHYSICAL EXAM:  Constitutional: NAD  Respiratory: Non-labored  Cardiovascular: irregular tachycardic  Gastrointestinal: Bowel Sounds present, soft, nontender.     LABS:                    10.8   11.87 )-----------( 251      ( 26 Dec 2022 08:33 )             33.7     140  |  113<H>  |  71<H>  ----------------------------<  157<H>  5.4<H>   |  17<L>  |  2.55<H>    Ca    9.0      26 Dec 2022 08:33  Phos  4.0     12-24  Mg     2.3     12-24    TPro  6.1  /  Alb  2.1<L>  /  TBili  0.4  /  DBili  x   /  AST  19  /  ALT  21  /  AlkPhos  74  12-26    TroponinI hsT: 4851.10, 4870.41, 44.66         MR Head No Cont (12.25.22 @ 10:34): Subacute infarct suspected involving the high right posterior frontal cortical/subcortical region.    TTE Echo Complete w/o Contrast w/ Doppler (12.15.21 @ 14:49) >   Severe mitral annular calcification is present. Severe mitral stenosis is present. Mild (1+) mitral regurgitation is present.   Mild to moderate aortic stenosis is present.   The left atrium is moderately dilated.   Endocardium is not well visualized, however, overall left ventricular systolic function appears normal as seen in limited views.  Estimated left ventricular ejection fraction is 55 %.   Mild concentric left ventricular hypertrophy is present.    Tele: AF w/ RVR

## 2022-12-26 NOTE — OCCUPATIONAL THERAPY INITIAL EVALUATION ADULT - PRECAUTIONS/LIMITATIONS, REHAB EVAL
diet: pureed-moderately thick, elevate HOB, supervise meals, 2L SPO2 if <92%/aspiration precautions/cardiac precautions/fall precautions/isolation precautions/seizure precautions

## 2022-12-26 NOTE — PHYSICAL THERAPY INITIAL EVALUATION ADULT - PLANNED THERAPY INTERVENTIONS, PT EVAL
bed mobility training/gait training/strengthening/transfer training bed mobility training/gait training/neuromuscular re-education/strengthening/transfer training

## 2022-12-26 NOTE — OCCUPATIONAL THERAPY INITIAL EVALUATION ADULT - RANGE OF MOTION EXAMINATION
RUE: WFL PROM, AROM SF ~90 degrees + crepitus noted/PROM ~120, elbow/hand WFL, LUE: PROM shoulder WFL, elbow limited by swelling/edema with elbow ROM- 1/2 range, edema in hands limiting ROM

## 2022-12-26 NOTE — PROGRESS NOTE ADULT - PROBLEM SELECTOR PLAN 2
Rate not controlled; increase diltiazem infusion -- if limited by hypotension, can add digoxin.  Warfarin may be best drug for anticoagulation given severe mitral stenosis; continue heparin infusion for now.    * I discussed case with Dr Arellano.

## 2022-12-26 NOTE — PROGRESS NOTE ADULT - SUBJECTIVE AND OBJECTIVE BOX
Interval History:  22: She had significant bleeding from IV site.    MEDICATIONS  (STANDING):  aspirin  chewable 81 milliGRAM(s) Oral daily  atorvastatin 40 milliGRAM(s) Oral at bedtime  budesonide  80 MICROgram(s)/formoterol 4.5 MICROgram(s) Inhaler 2 Puff(s) Inhalation two times a day  cefepime  Injectable. 1000 milliGRAM(s) IV Push every 24 hours  cholecalciferol 1000 Unit(s) Oral daily  dexAMETHasone  Injectable 6 milliGRAM(s) IV Push daily  diltiazem Infusion 5 mG/Hr (5 mL/Hr) IV Continuous <Continuous>  dorzolamide 2% Ophthalmic Solution 1 Drop(s) Both EYES three times a day  ferrous    sulfate 325 milliGRAM(s) Oral daily  heparin  Infusion.  Unit(s)/Hr (14 mL/Hr) IV Continuous <Continuous>  latanoprost 0.005% Ophthalmic Solution 1 Drop(s) Both EYES at bedtime  polyethylene glycol 3350 17 Gram(s) Oral daily  sodium chloride 0.9%. 1000 milliLiter(s) (75 mL/Hr) IV Continuous <Continuous>    MEDICATIONS  (PRN):  acetaminophen     Tablet .. 650 milliGRAM(s) Oral every 6 hours PRN Temp greater or equal to 38C (100.4F), Mild Pain (1 - 3)  digoxin  Injectable 125 MICROGram(s) IV Push once PRN HR> 130  heparin   Injectable 6500 Unit(s) IV Push every 6 hours PRN For aPTT less than 40  heparin   Injectable 3000 Unit(s) IV Push every 6 hours PRN For aPTT between 40 - 57      Allergies    No Known Allergies    Intolerances        PHYSICAL EXAM:  Vital Signs Last 24 Hrs  T(F): 97.2 (22 @ 08:56)  HR: 125 (22 @ 08:56)  BP: 104/62 (22 @ 08:56)  RR: 18 (22 @ 08:56)    GENERAL: NAD, well-groomed  HEAD:  Atraumatic, Normocephalic  Neuro:  Awake, alert, able to repeat simple sentences  CN: PERRL, EOMI, no nystagmus, mild left facial weakness, + dysarthria  motor: Some restricted range of motion in right shoulder otherwise strength intact in upper extremities  Weakness in b/l lower extremities  sensory: intact to light touch  gait: not tested      LABS:                        10.8   11.87 )-----------( 251      ( 26 Dec 2022 08:33 )             33.7         140  |  113<H>  |  71<H>  ----------------------------<  157<H>  5.4<H>   |  17<L>  |  2.55<H>    Ca    9.0      26 Dec 2022 08:33    TPro  6.1  /  Alb  2.1<L>  /  TBili  0.4  /  DBili  x   /  AST  19  /  ALT  21  /  AlkPhos  74      PTT - ( 26 Dec 2022 08:33 )  PTT:78.2 sec  Urinalysis Basic - ( 25 Dec 2022 06:05 )    Color: Yellow / Appearance: Clear / S.010 / pH: x  Gluc: x / Ketone: Negative  / Bili: Negative / Urobili: Negative   Blood: x / Protein: Negative / Nitrite: Negative   Leuk Esterase: Trace / RBC: 0-2 /HPF / WBC 6-10 /HPF   Sq Epi: x / Non Sq Epi: Occasional / Bacteria: Few        RADIOLOGY & ADDITIONAL STUDIES:    CT head 22:  Age-appropriate involutional and ischemic gliotic changes. No   hemorrhage. Old right cerebellar infarct. No change since 2022.    CTA head and neck, CT perfusion 22:  Impression: CT perfusion demonstrates bilateral occipital areas of   delayed mean transit time without evidence of the core infarct. CTA of   the head and neck reveals no large vessel occlusions or significant   stenosis.      MRI head 22:  Subacute infarct suspected involving the high right posterior   frontal cortical/subcortical region.

## 2022-12-26 NOTE — PHYSICAL THERAPY INITIAL EVALUATION ADULT - ADDITIONAL COMMENTS
Pt.'s son and his family live with Pt. There are 4 steps to enter home + HR. Pt. stays on main level and sleeps on recliner chair, Amb with RW and her son assist her with showering.

## 2022-12-26 NOTE — PROGRESS NOTE ADULT - SUBJECTIVE AND OBJECTIVE BOX
Reason for Admission: CVA, MI, Atrial fib.,  History of Present Illness:   HPI: The patient is an 89 yo female with Hx. of CAD, COPD, CVA R inferior cerebellar infarct, HLD, HTN, cervical spondylosis, Hospitalized in  on  for weakness sec. to COVID 19 infection , was discharged to [place] for CED on 22, was sent to the ER this am for slurred speech. The patient son states he did a facetime video with her around 11 am and found her to have slurred speech and hoarse voice.  In the ER she was found with new onset Atrial fib. with RVR , Left sided weakness,  was treated with IV Cardizem by ER MD. Low Bp treated with IVNS.  Troponin 4870      Medical progress: Sick, deconditioned and frail. Patient extensively bleeding from heparin IV site. Patient now with renal failure  Complaints: feeling scared  State of mind: at baseline  Son updated      REVIEW OF SYSTEMS:  unable to understand patient /  speech is slurred    Physical Exam:   GENERAL APPEARANCE:  Patient looks sick /  unable to understand her speech /  very deconditioned and frail  T(C): 36.7 (22 @ 10:05), Max: 37.2 (22 @ 15:45)  HR: 99 (22 @ 10:05) (65 - 147)  BP: 116/59 (22 @ 10:05) (88/54 - 173/135)  RR: 19 (22 @ 10:05) (18 - 28)  SpO2: 99% (22 @ 10:05) (88% - 100%)  HEENT:  Head is normocephalic    Skin:  Warm and dry without any rash   NECK:  Supple without lymphadenopathy.   HEART:  Regular rate and rhythm. normal S1 and S2, No M/R/G  LUNGS:  Good ins/exp effort, no W/R/R/C  ABDOMEN:  Soft, nontender, nondistended with good bowel sounds heard  EXTREMITIES:  Without cyanosis, clubbing or edema.   NEUROLOGICAL:  significant debility /  dysarthria / (L) facial droop /  (L) upper extremity qweakness/  b/l le generalized weakness.     Labs:   CBC Full  -  ( 24 Dec 2022 14:00 )  WBC Count : 15.44 K/uL  RBC Count : 4.83 M/uL  Hemoglobin : 14.4 g/dL  Hematocrit : 43.0 %  Platelet Count - Automated : 305 K/uL  Mean Cell Volume : 89.0 fl  Mean Cell Hemoglobin : 29.8 pg  Mean Cell Hemoglobin Concentration : 33.5 gm/dL  Auto Neutrophil # : 12.52 K/uL  Auto Lymphocyte # : 1.68 K/uL  Auto Monocyte # : 1.12 K/uL  Auto Eosinophil # : 0.02 K/uL  Auto Basophil # : 0.04 K/uL  Auto Neutrophil % : 81.0 %  Auto Lymphocyte % : 10.9 %  Auto Monocyte % : 7.3 %  Auto Eosinophil % : 0.1 %  Auto Basophil % : 0.3 %    PT/INR - ( 24 Dec 2022 14:00 )   PT: 14.0 sec;   INR: 1.20 ratio         PTT - ( 24 Dec 2022 14:00 )  PTT:32.4 sec  Urinalysis Basic - ( 25 Dec 2022 06:05 )    Color: Yellow / Appearance: Clear / S.010 / pH: x  Gluc: x / Ketone: Negative  / Bili: Negative / Urobili: Negative   Blood: x / Protein: Negative / Nitrite: Negative   Leuk Esterase: Trace / RBC: 0-2 /HPF / WBC 6-10 /HPF   Sq Epi: x / Non Sq Epi: Occasional / Bacteria: Few          139  |  111<H>  |  48<H>  ----------------------------<  126<H>  4.8   |  21<L>  |  1.59<H>    Ca    9.1      25 Dec 2022 07:45  Phos  4.0       Mg     2.3         TPro  7.5  /  Alb  2.7<L>  /  TBili  0.7  /  DBili  x   /  AST  51<H>  /  ALT  30  /  AlkPhos  101          # Atrial fibrillation with RVR  - IV Cardizem drip  - Continue metoprolol  - hold heparin due to extensive bleeding from cubidal fossa region   - poor rate control /  poor rhythm control    # Subacute CVA  - not a candidate for TPA  - MRI brain:  Subacute infarct suspected involving the high right posterior frontal cortical/subcortical region.  - ASA / STAT  - DVT prophylaxis  - Speech pathology  - PT    # Supportive care for covid-19 infection  - initiate remedies /  decadron as patient is on O2.   - supportive care    # Acute renal failure multifactorial in the setting of covid infection  # Hyperkalemia  - monitor renal function  - nephrology consulted    # Acute blood loss anemia from cubital fossa  - monitor HH  - significant bleeding /  will monitor    # Leukocytosis  - WBC trending down

## 2022-12-26 NOTE — OCCUPATIONAL THERAPY INITIAL EVALUATION ADULT - PERTINENT HX OF CURRENT PROBLEM, REHAB EVAL
Patient is a 89 y/o female with PMH of CAD, COPD, CVA R inferior cerebellar infarct, HLD, HTN, cervical spondylosis, Hospitalized in  on 12/22 for weakness 2* COVID 19 infection, was discharged to [place] for CED on 12/22/22, admitted on 12/24 from SNF for evaluation of slurred speech; upon admission was found to be in rapid afib; history per medical record as patient unable to provide history. In the ED she was found to have atrial fibrillation and RVR. Telestroke was consulted by the ED. She was not a candidate for tpa due to no last known normal and did not have a large vessel occlusion seen on imaging. MRI of head shows: Subacute infarct suspected involving the high right posterior   frontal cortical/subcortical region.

## 2022-12-26 NOTE — PHYSICAL THERAPY INITIAL EVALUATION ADULT - PRECAUTIONS/LIMITATIONS, REHAB EVAL
on 2 lits of o2/cardiac precautions/fall precautions/isolation precautions/oxygen therapy device and L/min

## 2022-12-26 NOTE — OCCUPATIONAL THERAPY INITIAL EVALUATION ADULT - ADL RETRAINING, OT EVAL
Pt will complete self-feeding with minimal assistance in 2 weeks using necessary AE prn. Pt will complete UE dressing using compensatory techniques prn with maximal assistance in 2 weeks.

## 2022-12-26 NOTE — OCCUPATIONAL THERAPY INITIAL EVALUATION ADULT - MANUAL MUSCLE TESTING RESULTS, REHAB EVAL
unable to follow commands to formally assess: RUE 3-/5 SF, grasp/elbow 3 to 3+/5/5, LUE: 2-/5 elbow unable to follow commands to formally assess: RUE 3-/5 SF, grasp/elbow 3 to 3+/5, LUE: 2-/5 elbow/grossly assessed due to

## 2022-12-26 NOTE — OCCUPATIONAL THERAPY INITIAL EVALUATION ADULT - NSACTIVITYREC_GEN_A_OT
Pt presents with impaired balance, endurance and muscle strength that will benefit from skilled OT to improve independence in ADLs, reduce fall risk and chance for readmission Pt presents with impaired balance, endurance and muscle strength that will benefit from skilled OT to improve independence in ADLs, reduce fall risk and chance for readmission.

## 2022-12-26 NOTE — PROGRESS NOTE ADULT - ASSESSMENT
The patient is an 89 yo female with Hx. of CAD, COPD, CVA R inferior cerebellar infarct, HLD, HTN, cervical spondylosis, Hospitalized in  on 12/22 for weakness in the setting of  COVID 19 infection. She was discharged to [place] for CED on 12/22/22. This morning she was sent to the ER for slurred speech. Her son reported that he had a video call with her at approximately 11 AM at which time he found that her speech was slurred and her voice was hoarse.   The ED physician spoke with a nurse at [place] who stated that her speech was slurred upon awakening at 7:30 AM. Last known normal was unclear.   In the ED she was found to have atrial fibrillation and RVR.  Telestroke was consulted by the ED.  She was not a candidate for tpa due to no last known normal and did not have a large vessel occlusion seen on imaging.  It was recommended to hold off on anticoagulation for now and to allow for permissive hypertension.    Dysarthria, left facial droop, left arm weakness  -MRI brain shows acute stroke in the right high posterior frontal region.  -Was not a candidate for tpa  -In new afib. IV heparin was started. However, she had significant bleeding from IV site and heparin is now on hold.   -BP control  -Aspirin.   -Statin  -lipid panel was checked on 12/22  -DVT prophylaxis  -Speech pathology appreciated.   -PT  -Echo pending    Supportive care for covid-19 infection    Dr. Leoms will take over neurology coverage on 12/27 and will f/u if needed.

## 2022-12-26 NOTE — OCCUPATIONAL THERAPY INITIAL EVALUATION ADULT - MD ORDER
"OT Evaluate and Treat"- MD orders received. Chart reviewed, contents noted, conferred with RN "OT Evaluate and Treat"- MD orders received. Chart reviewed, contents noted, conferred with UMA Morris- reports okay to be seen- HR runs high- to monitor, , 100% on O2.

## 2022-12-26 NOTE — OCCUPATIONAL THERAPY INITIAL EVALUATION ADULT - STRENGTHENING, PT EVAL
Pt will improve UE strength by 1/2 grade upon d/c from acute care OT program to promote improved participation with functional transfers.

## 2022-12-26 NOTE — DISCHARGE NOTE NURSING/CASE MANAGEMENT/SOCIAL WORK - PATIENT PORTAL LINK FT
You can access the FollowMyHealth Patient Portal offered by [facility] by registering at the following website: [email or web address]. By joining [employer]’s FollowMyHealth portal, you will also be able to view your health information using other applications (apps) compatible with our system.

## 2022-12-26 NOTE — PHYSICAL THERAPY INITIAL EVALUATION ADULT - PERTINENT HX OF CURRENT PROBLEM, REHAB EVAL
89 yo f s/p CVA, MI, Atrial fib., (25 Dec 2022 12:12). Pt. was discharged to [place] for CED on 12/22/22, admitted on 12/24 from snf for evaluation of slurred speech; upon admission was found to be in rapid afib. MRI jaylon: Subacute infarct suspected involving the high right posterior   frontal cortical/subcortical region. Pt. PMH of COPD presents s/p cough and shortness of breath; symptoms x 2 days. No known fevers or chills. Pt unable to provide hx 2/2 SOB. She is + for Influenza, on supplemental O2. Elevated troponin and BNP on admission.  SOB, acute hypoxic resp failure sec to Influenza AH3

## 2022-12-26 NOTE — OCCUPATIONAL THERAPY INITIAL EVALUATION ADULT - BALANCE TRAINING, PT EVAL
Pt will demonstrate improved static/dynamic balance to 1/2 grade in order to increase safety and independence in ADLs within 4 weeks

## 2022-12-26 NOTE — OCCUPATIONAL THERAPY INITIAL EVALUATION ADULT - ADDITIONAL COMMENTS
Pt is a questionable historian, reports she resides in a  with her son, DIL, and grandchildren. Pt reports having 4 HERNANDEZ from outside +HR and steps inside as well. Pt has a shower stall with GB's, commode over the toilet. Pt reports her son assisted with ADL tasks: dressing, showering, and she was (I) with self-feeding, hygiene/grooming, toileting, and ambulation using RW. Pt reports sleeping on a recliner chair at home that assisted with standing, RHD, glasses- readers. Pt is a questionable historian, reports she resides in a  with her son, DIL, and grandchildren. Pt reports having 4 HERNANDEZ from outside +HR and steps inside as well. Pt has a shower stall with GB's, commode over the toilet. Pt reports her son assisted with ADL tasks: dressing, showering, and she was (I) with self-feeding, hygiene/grooming, toileting, and ambulation using RW. Pt reports sleeping on a recliner chair at home that assisted with standing, RHD, glasses- readers. Possibly has HHA- patient unsure.

## 2022-12-26 NOTE — PHYSICAL THERAPY INITIAL EVALUATION ADULT - GENERAL OBSERVATIONS, REHAB EVAL
Pt was found lying in bed on 2 lits of o2, pt is pleasant and willing to participate in PT Pt. received semi-supine in bed, + bed alarmed, ace wrap to LUE + cardiac monitor in isolation room +SpO2, +IV infusing, agreeable to PT. OT also presented in room.

## 2022-12-26 NOTE — OCCUPATIONAL THERAPY INITIAL EVALUATION ADULT - GENERAL OBSERVATIONS, REHAB EVAL
Patient rec'd Patient rec'd/left semi-supine in bed, bed alarmed, ace wrap to LUE, +SpO2, +CM, +IV infusing, agreeable to OT IT, CB/TT/phone IR.

## 2022-12-27 NOTE — PROGRESS NOTE ADULT - SUBJECTIVE AND OBJECTIVE BOX
CC: CVA, MI, Atrial fib.,     HPI: 89 yo female with Hx. of CAD, COPD, CVA R inferior cerebellar infarct, HLD, HTN, cervical spondylosis, Hospitalized in  on 12/22 for weakness sec. to COVID 19 infection , was discharged to [place] for CED on 12/22/22, was sent to the ER this am for slurred speech. The patient son states he did a face time video with her around 11 am and found her to have slurred speech and hoarse voice.  In the ER she was found with new onset Atrial fib with RVR , Left sided weakness,  was treated with IV Cardizem by ER MD. Low Bp treated with IV NS.  Troponin 4870,     INTERVAL HPI/ OVERNIGHT EVENTS:    Vital Signs Last 24 Hrs  T(C): 36.6 (27 Dec 2022 08:40), Max: 36.6 (27 Dec 2022 08:40)  T(F): 97.8 (27 Dec 2022 08:40), Max: 97.8 (27 Dec 2022 08:40)  HR: 99 (27 Dec 2022 08:40) (99 - 114)  BP: 101/74 (27 Dec 2022 08:40) (101/74 - 118/57)  RR: 20 (27 Dec 2022 08:40) (18 - 20)  SpO2: 100% (27 Dec 2022 08:40) (98% - 100%)    Parameters below as of 27 Dec 2022 08:40  Patient On (Oxygen Delivery Method): nasal cannula          REVIEW OF SYSTEMS:    CONSTITUTIONAL: No weakness, fevers or chills  EYES/ENT: No visual changes;  No vertigo or throat pain   NECK: No pain or stiffness  RESPIRATORY: No cough, wheezing, hemoptysis; No shortness of breath  CARDIOVASCULAR: No chest pain or palpitations  GASTROINTESTINAL: No abdominal or epigastric pain. No nausea, vomiting, or hematemesis; No diarrhea or constipation. No melena or hematochezia.  GENITOURINARY: No dysuria, frequency or hematuria  NEUROLOGICAL: No numbness or weakness  SKIN: No itching, burning, rashes, or lesions   All other review of systems is negative unless indicated above.  PHYSICAL EXAM:    General: Well developed; well nourished; in no acute distress  Eyes: PERRLA, EOMI; conjunctiva and sclera clear  Head: Normocephalic; atraumatic  ENMT: No nasal discharge; airway clear  Neck: Supple; non tender; no masses  Respiratory: No wheezes, rales or rhonchi  Cardiovascular: Regular rate and rhythm. S1 and S2 Normal; No murmurs, gallops or rubs  Gastrointestinal: Soft non-tender non-distended; Normal bowel sounds  Genitourinary: No  suprapubic  tenderness  Extremities: Normal range of motion, No clubbing, cyanosis or edema  Vascular: Peripheral pulses palpable 2+ bilaterally  Neurological: Alert and oriented x4  Skin: Warm and dry. No acute rash  Lymph Nodes: No acute cervical adenopathy  Musculoskeletal: Normal muscle tone, without deformities  Psychiatric: Cooperative and appropriate    LABS:                         7.9    x     )-----------( x        ( 27 Dec 2022 14:03 )             24.4     27 Dec 2022 08:33    141    |  115    |  85     ----------------------------<  171    5.4     |  17     |  3.49     Ca    8.9        27 Dec 2022 08:33    TPro  5.5    /  Alb  2.2    /  TBili  0.4    /  DBili  x      /  AST  16     /  ALT  22     /  AlkPhos  60     27 Dec 2022 08:33    PT/INR - ( 27 Dec 2022 08:33 )   PT: 14.1 sec;   INR: 1.21 ratio         PTT - ( 27 Dec 2022 08:33 )  PTT:128.6 sec  CAPILLARY BLOOD GLUCOSE        LIVER FUNCTIONS - ( 27 Dec 2022 08:33 )  Alb: 2.2 g/dL / Pro: 5.5 gm/dL / ALK PHOS: 60 U/L / ALT: 22 U/L / AST: 16 U/L / GGT: x               MEDICATIONS  (STANDING):  aspirin  chewable 81 milliGRAM(s) Oral daily  atorvastatin 40 milliGRAM(s) Oral at bedtime  budesonide  80 MICROgram(s)/formoterol 4.5 MICROgram(s) Inhaler 2 Puff(s) Inhalation two times a day  cefepime  Injectable. 1000 milliGRAM(s) IV Push every 24 hours  cholecalciferol 1000 Unit(s) Oral daily  dexAMETHasone  Injectable 6 milliGRAM(s) IV Push daily  diltiazem Infusion 5 mG/Hr (5 mL/Hr) IV Continuous <Continuous>  dorzolamide 2% Ophthalmic Solution 1 Drop(s) Both EYES three times a day  ferrous    sulfate 325 milliGRAM(s) Oral daily  heparin  Infusion.  Unit(s)/Hr (14 mL/Hr) IV Continuous <Continuous>  latanoprost 0.005% Ophthalmic Solution 1 Drop(s) Both EYES at bedtime  polyethylene glycol 3350 17 Gram(s) Oral daily  sodium chloride 0.9%. 1000 milliLiter(s) (75 mL/Hr) IV Continuous <Continuous>    MEDICATIONS  (PRN):  acetaminophen     Tablet .. 650 milliGRAM(s) Oral every 6 hours PRN Temp greater or equal to 38C (100.4F), Mild Pain (1 - 3)  digoxin  Injectable 125 MICROGram(s) IV Push once PRN HR> 130  heparin   Injectable 6500 Unit(s) IV Push every 6 hours PRN For aPTT less than 40  heparin   Injectable 3000 Unit(s) IV Push every 6 hours PRN For aPTT between 40 - 57      RADIOLOGY & ADDITIONAL TESTS:    < from: US Kidney and Bladder (12.26.22 @ 15:17) >    ACC: [id number] EXAM:  US KIDNEYS AND BLADDER                          PROCEDURE DATE:  12/26/2022          INTERPRETATION:  CLINICAL INFORMATION: Renal failure. Covid.    COMPARISON: Renal ultrasound 12/19/2021 and CT chest 12/15/2021    TECHNIQUE: Sonography of the kidneys and bladder. The examination was   technically limited secondary to overlying bowel gas.    FINDINGS:  Right kidney: 9.2 cm. Limited. No hydronephrosis.    Left kidney: 9.6 cm. Limited. No hydronephrosis.    Urinary bladder: Unremarkable.    IMPRESSION:    Limited study.    No hydronephrosis.            ACC: [id number] EXAM:  MR BRAIN                          *** ADDENDUM***    Please note area of abnormal T2 prolongation with restricted diffusion is   seen involving the high right posterior frontal cortical and subcortical   region not the left side as stated in the body of the report.    --- End of Report ---    *** END OF ADDENDUM***      PROCEDURE DATE:  12/25/2022          INTERPRETATION:  Clinical indication: Dysarthria. Left facial droop.    MRI of the brain was performed using sagittal T1 axial T1 T2 T2 FLAIR   diffusion and susceptibility weighted sequence    This exam is compared with prior brain MRI performed on January 14, 2015   and prior CT scan performed on December 24, 2022.    Extensive parenchymal volume loss and chronic microvascular ischemic   changes are identified.    There is evidence of a new vague area of T2 prolongation and restricted   diffusion seen involving the high left posterior frontal   cortical/subcortical region. This is best seen on series 5 image 51 and   does demonstrate decreased signal on the ADC mapping. This could be   compatible with a subacute infarct. No hemorrhagic transformation is   seen. No significant shift or herniation is seen.    Encephalomalacia and gliosis involving the inferiorright cerebellar   region is identified. This chronic appearing infarct has demonstrated   expected evolutionary changes when compared with the prior brain MRI.    There are no abnormal intra or extra-axial collections seen.    The large vessels demonstrate normal flow-voids.    Extensive mucosal thickening is seen involving both sphenoid sinus with   air-fluid levels involving the right sphenoid sinus. Bilateral ethmoid   sinus mucosal thickening is seen. The patient is status post bilateral   cataract surgery. Both mastoid and middle ear regions appear clear.    IMPRESSION: Subacute infarct suspected involving the high right posterior   frontal cortical/subcortical region.         CC: CVA, MI, Atrial fib.,     HPI: 89 yo female with Hx. of CAD, COPD, CVA R inferior cerebellar infarct, HLD, HTN, cervical spondylosis, Hospitalized in  on 12/22 for weakness sec. to COVID 19 infection , was discharged to [place] for CED on 12/22/22, was sent to the ER this am for slurred speech. The patient son states he did a face time video with her around 11 am and found her to have slurred speech and hoarse voice.  In the ER she was found with new onset Atrial fib with RVR , Left sided weakness,  was treated with IV Cardizem by ER MD. Low Bp treated with IV NS.  Troponin 4870,     INTERVAL HPI/ OVERNIGHT EVENTS: chart reviewed, Pt was seen and examined, states " im am fine". Pt denies SOB, cough is better, Pt denies CP or abd or back pain.  Had BM, non bloody, not dark. Had episode of bleeding from IV sight, improved today.     Vital Signs Last 24 Hrs  T(C): 36.6 (27 Dec 2022 08:40), Max: 36.6 (27 Dec 2022 08:40)  T(F): 97.8 (27 Dec 2022 08:40), Max: 97.8 (27 Dec 2022 08:40)  HR: 99 (27 Dec 2022 08:40) (99 - 114)  BP: 101/74 (27 Dec 2022 08:40) (101/74 - 118/57)  RR: 20 (27 Dec 2022 08:40) (18 - 20)  SpO2: 100% (27 Dec 2022 08:40) (98% - 100%)    Parameters below as of 27 Dec 2022 08:40  Patient On (Oxygen Delivery Method): nasal cannula      REVIEW OF SYSTEMS:  All other review of systems is negative unless indicated above.      PHYSICAL EXAM:  General: Well developed; in no acute distress, on NC   Eyes:  EOMI; conjunctiva and sclera clear  Head: Normocephalic; atraumatic  ENMT: No nasal discharge; airway clear  Neck: Supple; non tender; no masses  Respiratory: Diminished BS b/l, no wheezes  Cardiovascular: Irregular rate and rhythm. S1 and S2 Normal;  Gastrointestinal: Soft non-tender non-distended; Normal bowel sounds  Genitourinary: No  suprapubic  tenderness  Extremities: + edema  Vascular: Peripheral pulses palpable 2+ bilaterally  Neurological: Alert and oriented x 2-3, non focal   Skin: Warm and dry. Multiple  ecchymotic changes UE b/l.   Musculoskeletal: Normal muscle tone, without deformities  Psychiatric: Cooperative and appropriate    LABS:                         7.9    x     )-----------( x        ( 27 Dec 2022 14:03 )             24.4     27 Dec 2022 08:33    141    |  115    |  85     ----------------------------<  171    5.4     |  17     |  3.49     Ca    8.9        27 Dec 2022 08:33    TPro  5.5    /  Alb  2.2    /  TBili  0.4    /  DBili  x      /  AST  16     /  ALT  22     /  AlkPhos  60     27 Dec 2022 08:33    PT/INR - ( 27 Dec 2022 08:33 )   PT: 14.1 sec;   INR: 1.21 ratio         PTT - ( 27 Dec 2022 08:33 )  PTT:128.6 sec  CAPILLARY BLOOD GLUCOSE        LIVER FUNCTIONS - ( 27 Dec 2022 08:33 )  Alb: 2.2 g/dL / Pro: 5.5 gm/dL / ALK PHOS: 60 U/L / ALT: 22 U/L / AST: 16 U/L / GGT: x               MEDICATIONS  (STANDING):  aspirin  chewable 81 milliGRAM(s) Oral daily  atorvastatin 40 milliGRAM(s) Oral at bedtime  budesonide  80 MICROgram(s)/formoterol 4.5 MICROgram(s) Inhaler 2 Puff(s) Inhalation two times a day  cefepime  Injectable. 1000 milliGRAM(s) IV Push every 24 hours  cholecalciferol 1000 Unit(s) Oral daily  dexAMETHasone  Injectable 6 milliGRAM(s) IV Push daily  diltiazem Infusion 5 mG/Hr (5 mL/Hr) IV Continuous <Continuous>  dorzolamide 2% Ophthalmic Solution 1 Drop(s) Both EYES three times a day  ferrous    sulfate 325 milliGRAM(s) Oral daily  heparin  Infusion.  Unit(s)/Hr (14 mL/Hr) IV Continuous <Continuous>  latanoprost 0.005% Ophthalmic Solution 1 Drop(s) Both EYES at bedtime  polyethylene glycol 3350 17 Gram(s) Oral daily  sodium chloride 0.9%. 1000 milliLiter(s) (75 mL/Hr) IV Continuous <Continuous>    MEDICATIONS  (PRN):  acetaminophen     Tablet .. 650 milliGRAM(s) Oral every 6 hours PRN Temp greater or equal to 38C (100.4F), Mild Pain (1 - 3)  digoxin  Injectable 125 MICROGram(s) IV Push once PRN HR> 130  heparin   Injectable 6500 Unit(s) IV Push every 6 hours PRN For aPTT less than 40  heparin   Injectable 3000 Unit(s) IV Push every 6 hours PRN For aPTT between 40 - 57      RADIOLOGY & ADDITIONAL TESTS:    < from: US Kidney and Bladder (12.26.22 @ 15:17) >    ACC: [id number] EXAM:  US KIDNEYS AND BLADDER                          PROCEDURE DATE:  12/26/2022          INTERPRETATION:  CLINICAL INFORMATION: Renal failure. Covid.    COMPARISON: Renal ultrasound 12/19/2021 and CT chest 12/15/2021    TECHNIQUE: Sonography of the kidneys and bladder. The examination was   technically limited secondary to overlying bowel gas.    FINDINGS:  Right kidney: 9.2 cm. Limited. No hydronephrosis.    Left kidney: 9.6 cm. Limited. No hydronephrosis.    Urinary bladder: Unremarkable.    IMPRESSION:    Limited study.    No hydronephrosis.            ACC: [id number] EXAM:  MR BRAIN                          *** ADDENDUM***    Please note area of abnormal T2 prolongation with restricted diffusion is   seen involving the high right posterior frontal cortical and subcortical   region not the left side as stated in the body of the report.    --- End of Report ---    *** END OF ADDENDUM***      PROCEDURE DATE:  12/25/2022          INTERPRETATION:  Clinical indication: Dysarthria. Left facial droop.    MRI of the brain was performed using sagittal T1 axial T1 T2 T2 FLAIR   diffusion and susceptibility weighted sequence    This exam is compared with prior brain MRI performed on January 14, 2015   and prior CT scan performed on December 24, 2022.    Extensive parenchymal volume loss and chronic microvascular ischemic   changes are identified.    There is evidence of a new vague area of T2 prolongation and restricted   diffusion seen involving the high left posterior frontal   cortical/subcortical region. This is best seen on series 5 image 51 and   does demonstrate decreased signal on the ADC mapping. This could be   compatible with a subacute infarct. No hemorrhagic transformation is   seen. No significant shift or herniation is seen.    Encephalomalacia and gliosis involving the inferiorright cerebellar   region is identified. This chronic appearing infarct has demonstrated   expected evolutionary changes when compared with the prior brain MRI.    There are no abnormal intra or extra-axial collections seen.    The large vessels demonstrate normal flow-voids.    Extensive mucosal thickening is seen involving both sphenoid sinus with   air-fluid levels involving the right sphenoid sinus. Bilateral ethmoid   sinus mucosal thickening is seen. The patient is status post bilateral   cataract surgery. Both mastoid and middle ear regions appear clear.    IMPRESSION: Subacute infarct suspected involving the high right posterior   frontal cortical/subcortical region.

## 2022-12-27 NOTE — PROGRESS NOTE ADULT - ASSESSMENT
# Atrial fibrillation with RVR  - IV Cardizem drip  - Continue metoprolol  - hold heparin due to extensive bleeding from cubidal fossa region         # Subacute CVA  - not a candidate for TPA  - MRI brain:  Subacute infarct suspected involving the high right posterior frontal cortical/subcortical region.  - ASA  - Speech pathology  - PT    # Supportive care for covid-19 infection  -  decadron as patient is on O2.   - supportive care    # Acute renal failure multifactorial in the setting of covid infection  # Hyperkalemia  - monitor renal function  - nephrology consulted    # Acute blood loss anemia from cubital fossa  - monitor HH  - significant bleeding /  will monitor    # Leukocytosis  - WBC trending down 89 yo female with Hx. of CAD, COPD, CVA R inferior cerebellar infarct, HLD, HTN, cervical spondylosis admitted for:       # New Onset Atrial fibrillation with RVR  - c/w tele, rate is better now 90-100s  - C/w  cardizem  drip  - PO  metoprolol on hol due to soft BP   - hold heparin due to  drop in H/H       # Subacute CVA  - not a candidate for TPA on admission   - MRI brain:  Subacute infarct suspected involving the high right posterior frontal cortical/subcortical region.  - C/w ASA  - C/w statin   - Speech pathology  - PT  - Hold a/c  for now       # Elevated troponin,  NSTEMI type II  in settings of acute infection and  AFIB   ECHO: pending   C/w ASA, hold heparin drip for now   C/w stating  CArdio eval     # Acute Hypoxic respiratory failure due top COVID 19 PNA  -  On 2L NC, monitor pulse ox   - Not candidate for Remdesivir due to GIOVANNA   - C/w decadron   - supportive care    # Acute renal failure due to COVID vs  contrast nephropathy   # Hyperkalemia  - monitor renal function, still trending up  - on gentle IVF   - Monitor for Urinary retention, bladder scan   - D/w DR Awan     # Acute On Chronic  blood loss anemia likely  from cubital fossa  - now bleeding  stopped, has extensive ecchymoses of sof tissues UE   -  monitor HH, down this am, will trend, transfuse PRN   - Hold A/c  - Stat CT bad/pelvis/chest ordered to r/o retroperitoneal hematoma       # Leukocytosis  - WBC trending down      # ACP: results, Pts condition and further plan discussed with Pts son, advance directives discussed,  Son wants to sign MOLST form, PT is DNR/DNI no other limitations at this  time

## 2022-12-27 NOTE — CONSULT NOTE ADULT - SUBJECTIVE AND OBJECTIVE BOX
Addended by: Mary Agrawal on: 6/15/2020 09:36 AM     Modules accepted: Orders   89 with Hx. of CAD, COPD, CVA R inferior cerebellar infarct, HLD, HTN, cervical spondylosis, Hospitalized in  on 12/22 for weakness sec. to COVID 19 infection , was discharged to [place] for CED on 12/22/22, was sent to the ER this am for slurred speech with renal evaluation of GIOVANNA. Per notes,  patient son states he did a face time video with her around 11 am and found her to have slurred speech and hoarse voice.  In the ER she was found with new onset Atrial fib with RVR , Left sided weakness,  was treated with IV Cardizem by ER MD. Low Bp treated with IV NS. Did get CTA in the ED and renal evaluation now for GIOVANNA. Has a history of GIOVANNA w prior atdmit as well , stabilized with hydration.       PAST MEDICAL & SURGICAL HISTORY:  COPD without exacerbation      CAD (coronary artery disease)      CVA (cerebrovascular accident)      Basal cell carcinoma      HLD (hyperlipidemia)      HTN (hypertension)      Anterolisthesis      Degeneration, intervertebral disc, cervical      History of tonsillectomy          MEDICATIONS  (STANDING):  aspirin  chewable 81 milliGRAM(s) Oral daily  atorvastatin 40 milliGRAM(s) Oral at bedtime  budesonide  80 MICROgram(s)/formoterol 4.5 MICROgram(s) Inhaler 2 Puff(s) Inhalation two times a day  cefepime  Injectable. 1000 milliGRAM(s) IV Push every 24 hours  cholecalciferol 1000 Unit(s) Oral daily  dexAMETHasone  Injectable 6 milliGRAM(s) IV Push daily  diltiazem Infusion 5 mG/Hr (5 mL/Hr) IV Continuous <Continuous>  dorzolamide 2% Ophthalmic Solution 1 Drop(s) Both EYES three times a day  ferrous    sulfate 325 milliGRAM(s) Oral daily  heparin  Infusion.  Unit(s)/Hr (14 mL/Hr) IV Continuous <Continuous>  latanoprost 0.005% Ophthalmic Solution 1 Drop(s) Both EYES at bedtime  polyethylene glycol 3350 17 Gram(s) Oral daily  sodium chloride 0.9%. 1000 milliLiter(s) (75 mL/Hr) IV Continuous <Continuous>    MEDICATIONS  (PRN):  acetaminophen     Tablet .. 650 milliGRAM(s) Oral every 6 hours PRN Temp greater or equal to 38C (100.4F), Mild Pain (1 - 3)  digoxin  Injectable 125 MICROGram(s) IV Push once PRN HR> 130  heparin   Injectable 6500 Unit(s) IV Push every 6 hours PRN For aPTT less than 40  heparin   Injectable 3000 Unit(s) IV Push every 6 hours PRN For aPTT between 40 - 57      Allergies    No Known Allergies    Intolerances        SOCIAL HISTORY:  no etoh/cigg    FAMILY HISTORY:  FH: pancreatic cancer (Mother)        REVIEW OF SYSTEMS:    CONSTITUTIONAL: stable weakness, no fevers or chills  EYES/ENT: No visual changes;  No vertigo or throat pain   alert      T(C): , Max: 36.6 (12-27-22 @ 08:40)  T(F): , Max: 97.8 (12-27-22 @ 08:40)  HR: 99 (12-27-22 @ 08:40)  BP: 101/74 (12-27-22 @ 08:40)  BP(mean): --  RR: 20 (12-27-22 @ 08:40)  SpO2: 100% (12-27-22 @ 08:40)  Wt(kg): --        PHYSICAL EXAM:    Constitutional: frail, ill appearing  HEENT:  MM  dist  Cardiovascular: S1 and S2   Gastrointestinal: soft  Extremities: tr peripheral edema  Neurological: Arouable        LABS:                        7.9    x     )-----------( x        ( 27 Dec 2022 14:03 )             24.4     27 Dec 2022 08:33    141    |  115    |  85     ----------------------------<  171    5.4     |  17     |  3.49   26 Dec 2022 08:33    140    |  113    |  71     ----------------------------<  157    5.4     |  17     |  2.55   25 Dec 2022 07:45    139    |  111    |  48     ----------------------------<  126    4.8     |  21     |  1.59   24 Dec 2022 14:00    136    |  108    |  40     ----------------------------<  155    5.3     |  21     |  1.38     Ca    8.9        27 Dec 2022 08:33  Ca    9.0        26 Dec 2022 08:33  Ca    9.1        25 Dec 2022 07:45  Ca    9.3        24 Dec 2022 14:00  Phos  4.0       24 Dec 2022 14:00  Mg     2.3       24 Dec 2022 14:00    TPro  5.5    /  Alb  2.2    /  TBili  0.4    /  DBili  x      /  AST  16     /  ALT  22     /  AlkPhos  60     27 Dec 2022 08:33  TPro  6.1    /  Alb  2.1    /  TBili  0.4    /  DBili  x      /  AST  19     /  ALT  21     /  AlkPhos  74     26 Dec 2022 08:33  TPro  7.5    /  Alb  2.7    /  TBili  0.7    /  DBili  x      /  AST  51     /  ALT  30     /  AlkPhos  101    24 Dec 2022 14:00          Urine Studies:          RADIOLOGY & ADDITIONAL STUDIES:

## 2022-12-27 NOTE — PROVIDER CONTACT NOTE (CRITICAL VALUE NOTIFICATION) - BACKGROUND
Patient admitted for slurred speech, +covid. MRI positive for cva. Also on cardizem drip @ 10mG/mL for Afib RVR.

## 2022-12-27 NOTE — PROGRESS NOTE ADULT - PROBLEM SELECTOR PLAN 4
likely demand related ischemia in setting of AF RVR/COVID infection patient denies CP no chest pain  Echo to assess ventricular function ( done await final read )   optimize HR

## 2022-12-27 NOTE — PROGRESS NOTE ADULT - SUBJECTIVE AND OBJECTIVE BOX
REASON FOR VISIT: AF    HPI:  89 year old woman with a history of CAD, COPD, CVA, HLD, HTN, severe mitral stenosis, recent COVID-19 infection admitted with subacute CVA and a new diagnosis of AF.    12/26/22.  No new complaints.  12/27/22 frail deconditioned appearing tele AF , pressure low, Drop in H&H noted       MEDICATIONS  (STANDING):  aspirin  chewable 81 milliGRAM(s) Oral daily  atorvastatin 40 milliGRAM(s) Oral at bedtime  budesonide  80 MICROgram(s)/formoterol 4.5 MICROgram(s) Inhaler 2 Puff(s) Inhalation two times a day  cefepime  Injectable. 1000 milliGRAM(s) IV Push every 24 hours  cholecalciferol 1000 Unit(s) Oral daily  dexAMETHasone  Injectable 6 milliGRAM(s) IV Push daily  diltiazem Infusion 5 mG/Hr (5 mL/Hr) IV Continuous <Continuous>  dorzolamide 2% Ophthalmic Solution 1 Drop(s) Both EYES three times a day  ferrous    sulfate 325 milliGRAM(s) Oral daily  heparin  Infusion.  Unit(s)/Hr (14 mL/Hr) IV Continuous <Continuous>  latanoprost 0.005% Ophthalmic Solution 1 Drop(s) Both EYES at bedtime  polyethylene glycol 3350 17 Gram(s) Oral daily  sodium chloride 0.9%. 1000 milliLiter(s) (75 mL/Hr) IV Continuous <Continuous>    MEDICATIONS  (PRN):  acetaminophen     Tablet .. 650 milliGRAM(s) Oral every 6 hours PRN Temp greater or equal to 38C (100.4F), Mild Pain (1 - 3)  digoxin  Injectable 125 MICROGram(s) IV Push once PRN HR> 130  heparin   Injectable 6500 Unit(s) IV Push every 6 hours PRN For aPTT less than 40  heparin   Injectable 3000 Unit(s) IV Push every 6 hours PRN For aPTT between 40 - 57    Vital Signs Last 24 Hrs  T(C): 36.6 (27 Dec 2022 08:40), Max: 36.6 (27 Dec 2022 08:40)  T(F): 97.8 (27 Dec 2022 08:40), Max: 97.8 (27 Dec 2022 08:40)  HR: 99 (27 Dec 2022 08:40) (99 - 114)  BP: 101/74 (27 Dec 2022 08:40) (101/74 - 118/57)  BP(mean): --  RR: 20 (27 Dec 2022 08:40) (18 - 20)  SpO2: 100% (27 Dec 2022 08:40) (98% - 100%)    Parameters below as of 27 Dec 2022 08:40  Patient On (Oxygen Delivery Method): nasal cannula      PHYSICAL EXAM:  Constitutional: Awake alert frail poor skin turgor   Respiratory: Non-labored  Cardiovascular: irregular tachycardic  Gastrointestinal: softly distended, nontender.     LABS:                    10.8   11.87 )-----------( 251      ( 26 Dec 2022 08:33 )             33.7     140  |  113<H>  |  71<H>  ----------------------------<  157<H>  5.4<H>   |  17<L>  |  2.55<H>    Ca    9.0      26 Dec 2022 08:33  Phos  4.0     12-24  Mg     2.3     12-24    TPro  6.1  /  Alb  2.1<L>  /  TBili  0.4  /  DBili  x   /  AST  19  /  ALT  21  /  AlkPhos  74  12-26    TroponinI hsT: 4851.10, 4870.41, 44.66         MR Head No Cont (12.25.22 @ 10:34): Subacute infarct suspected involving the high right posterior frontal cortical/subcortical region.    TTE Echo Complete w/o Contrast w/ Doppler (12.15.21 @ 14:49) >   Severe mitral annular calcification is present. Severe mitral stenosis is present. Mild (1+) mitral regurgitation is present.   Mild to moderate aortic stenosis is present.   The left atrium is moderately dilated.   Endocardium is not well visualized, however, overall left ventricular systolic function appears normal as seen in limited views.  Estimated left ventricular ejection fraction is 55 %.   Mild concentric left ventricular hypertrophy is present.    Tele: AF w/ RVR

## 2022-12-27 NOTE — PROGRESS NOTE ADULT - PROBLEM SELECTOR PLAN 2
Rate improved from yesterday on Cardizem GTT However, HR's remain mildly tachycardic 's with brief RVR, Pressure hypotensive with a Drop in H&H HCT 33.7 now 24.9;  hold heparin, stat Type and cross, CT Abd/Pelvis ( Abd softly distended ) consider transfer to Unit  [facility] Hospitalist  Digoxin not an option in setting of renal failure ( Creat 3.49 )

## 2022-12-28 NOTE — PROGRESS NOTE ADULT - SUBJECTIVE AND OBJECTIVE BOX
Patient is a 89y Female who reports no complaints as new, still on 02.        MEDICATIONS  (STANDING):  aspirin  chewable 81 milliGRAM(s) Oral daily  atorvastatin 40 milliGRAM(s) Oral at bedtime  budesonide  80 MICROgram(s)/formoterol 4.5 MICROgram(s) Inhaler 2 Puff(s) Inhalation two times a day  cefepime  Injectable. 1000 milliGRAM(s) IV Push every 24 hours  cholecalciferol 1000 Unit(s) Oral daily  diltiazem Infusion 5 mG/Hr (5 mL/Hr) IV Continuous <Continuous>  dorzolamide 2% Ophthalmic Solution 1 Drop(s) Both EYES three times a day  ferrous    sulfate 325 milliGRAM(s) Oral daily  latanoprost 0.005% Ophthalmic Solution 1 Drop(s) Both EYES at bedtime  polyethylene glycol 3350 17 Gram(s) Oral daily  sodium chloride 0.9%. 1000 milliLiter(s) (75 mL/Hr) IV Continuous <Continuous>    MEDICATIONS  (PRN):  acetaminophen     Tablet .. 650 milliGRAM(s) Oral every 6 hours PRN Temp greater or equal to 38C (100.4F), Mild Pain (1 - 3)  digoxin  Injectable 125 MICROGram(s) IV Push once PRN HR> 130        T(C): , Max: 36.3 (12-27-22 @ 21:18)  T(F): , Max: 97.4 (12-27-22 @ 21:18)  HR: 74 (12-28-22 @ 09:18)  BP: 135/57 (12-28-22 @ 09:18)  BP(mean): 83 (12-27-22 @ 16:00)  RR: 20 (12-28-22 @ 06:49)  SpO2: 99% (12-28-22 @ 06:49)  Wt(kg): --        PHYSICAL EXAM:    Constitutional: frail, ill appearing  HEENT: MM  Neck: No LAD, No JVD  Respiratory: dist  Cardiovascular: S1 and S   chronic changes  Neurological: Awake           LABS:                        7.1    10.60 )-----------( 252      ( 28 Dec 2022 07:47 )             22.3     28 Dec 2022 07:47    144    |  117    |  102    ----------------------------<  153    5.4     |  16     |  4.01   27 Dec 2022 08:33    141    |  115    |  85     ----------------------------<  171    5.4     |  17     |  3.49   26 Dec 2022 08:33    140    |  113    |  71     ----------------------------<  157    5.4     |  17     |  2.55   25 Dec 2022 07:45    139    |  111    |  48     ----------------------------<  126    4.8     |  21     |  1.59   24 Dec 2022 14:00    136    |  108    |  40     ----------------------------<  155    5.3     |  21     |  1.38     Ca    9.0        28 Dec 2022 07:47  Ca    8.9        27 Dec 2022 08:33  Ca    9.0        26 Dec 2022 08:33  Ca    9.1        25 Dec 2022 07:45  Ca    9.3        24 Dec 2022 14:00  Phos  4.0       24 Dec 2022 14:00  Mg     2.3       24 Dec 2022 14:00    TPro  5.5    /  Alb  2.2    /  TBili  0.4    /  DBili  x      /  AST  16     /  ALT  22     /  AlkPhos  60     27 Dec 2022 08:33  TPro  6.1    /  Alb  2.1    /  TBili  0.4    /  DBili  x      /  AST  19     /  ALT  21     /  AlkPhos  74     26 Dec 2022 08:33  TPro  7.5    /  Alb  2.7    /  TBili  0.7    /  DBili  x      /  AST  51     /  ALT  30     /  AlkPhos  101    24 Dec 2022 14:00          Urine Studies:          RADIOLOGY & ADDITIONAL STUDIES:

## 2022-12-28 NOTE — PROGRESS NOTE ADULT - ASSESSMENT
89 CAD, COPD, CVA R inferior cerebellar infarct, HLD, HTN, cervical spondylosis, Hospitalized in  on 12/22 for weakness sec. to COVID 19 infection , was discharged to [facility] for CED on 12/22/22, was sent to the ER this am for slurred speech with renal evaluation of GIOVANNA.    GIOVANNA  likely secondary to contrast, hemodynamic as well with AF/CVA but repeat CT confirms JURGEN  Keep ivf, PRBC may be better for intravascular volume. Defer to m edical team  Can even give lasix if needed to augment UOP with blood and for K control  K treatment with lokelma  Would not be a good candidate for aggressive renal care with multi co-morbodities  No further contrast/NSAID's  Optimize intake    AFIB  -CVS  -CCB  -Maintain map to optimize renal perfusion    Hyperkalemia  -Monitor values  -limit k in diet  -may need lokelma therapy     d/c with rn staff, dr Gupta

## 2022-12-28 NOTE — CONSULT NOTE ADULT - SUBJECTIVE AND OBJECTIVE BOX
HPI:     Pt is a 89y old Female with hx of presented from rehab w/ slurred speech that she had woken up w/ and was outside window of TPA.  Since she was admitted found to be covid + and now acute on chronic anemia possibly d/t acute blood loss.  She is also w/ GIOVANNA .     Pt is very tearful and A+ox 3 but was very nervous and scared and stated she wasnt really sure whats going on but she's very affraid of dying and asked me "is this the end??"     We discussed her blood was lower and she was receiving  a blood transfusion at this time.     emotional support provided and she stated team could talk to her children regarding her medical care          PAIN: ( )Yes   ( x)No  Level:\   DYSPNEA: ( ) Yes  (x ) No  Level:    PAST MEDICAL & SURGICAL HISTORY:  COPD without exacerbation      CAD (coronary artery disease)      CVA (cerebrovascular accident)      Basal cell carcinoma      HLD (hyperlipidemia)      HTN (hypertension)      Anterolisthesis      Degeneration, intervertebral disc, cervical      History of tonsillectomy          SOCIAL HX:    Hx opiate tolerance ( )YES  (x )NO    Baseline ADLs  (Prior to Admission)  ( ) Independent   ( x)Dependent    FAMILY HISTORY:  FH: pancreatic cancer (Mother)        Review of Systems:    Anxiety- nervous and tearful   Depression- denied  Physical Discomfort- denied  Dyspnea- denied  Constipation- denied  Diarrhea- +   Nausea- denied  Vomiting- denied  Anorexia- ++  Weight Loss- denied   Cough-  ++  Secretions- none  Fatigue- ++   Weakness- +  Delirium- -    All other systems reviewed and negative       PHYSICAL EXAM:    Vital Signs Last 24 Hrs  T(C): 36.9 (28 Dec 2022 20:40), Max: 36.9 (28 Dec 2022 20:40)  T(F): 98.4 (28 Dec 2022 20:40), Max: 98.4 (28 Dec 2022 20:40)  HR: 89 (28 Dec 2022 20:40) (72 - 137)  BP: 134/55 (28 Dec 2022 20:40) (103/46 - 135/57)  BP(mean): --  RR: 20 (28 Dec 2022 20:40) (18 - 30)  SpO2: 99% (28 Dec 2022 20:40) (97% - 100%)    Parameters below as of 28 Dec 2022 20:40  Patient On (Oxygen Delivery Method): nasal cannula      Daily     Daily     PPSV2:   %  FAST:    General:  Mental Status:  HEENT:  Lungs:  Cardiac:  GI:  :  Ext:  Neuro:      LABS:                        7.1    10.60 )-----------( 252      ( 28 Dec 2022 07:47 )             22.3     12-28    144  |  117<H>  |  102<H>  ----------------------------<  153<H>  5.4<H>   |  16<L>  |  4.01<H>    Ca    9.0      28 Dec 2022 07:47    TPro  5.5<L>  /  Alb  2.2<L>  /  TBili  0.4  /  DBili  x   /  AST  16  /  ALT  22  /  AlkPhos  60  12-27    PT/INR - ( 27 Dec 2022 08:33 )   PT: 14.1 sec;   INR: 1.21 ratio         PTT - ( 27 Dec 2022 17:24 )  PTT:30.6 sec  Albumin: Albumin, Serum: 2.2 g/dL (12-27 @ 08:33)      Allergies    No Known Allergies    Intolerances      MEDICATIONS  (STANDING):  aspirin  chewable 81 milliGRAM(s) Oral daily  atorvastatin 40 milliGRAM(s) Oral at bedtime  budesonide  80 MICROgram(s)/formoterol 4.5 MICROgram(s) Inhaler 2 Puff(s) Inhalation two times a day  cefepime  Injectable. 1000 milliGRAM(s) IV Push every 24 hours  cholecalciferol 1000 Unit(s) Oral daily  diltiazem Infusion 5 mG/Hr (5 mL/Hr) IV Continuous <Continuous>  dorzolamide 2% Ophthalmic Solution 1 Drop(s) Both EYES three times a day  ferrous    sulfate 325 milliGRAM(s) Oral daily  latanoprost 0.005% Ophthalmic Solution 1 Drop(s) Both EYES at bedtime  metoprolol tartrate 25 milliGRAM(s) Oral three times a day  polyethylene glycol 3350 17 Gram(s) Oral daily    MEDICATIONS  (PRN):  acetaminophen     Tablet .. 650 milliGRAM(s) Oral every 6 hours PRN Temp greater or equal to 38C (100.4F), Mild Pain (1 - 3)  ALPRAZolam 0.25 milliGRAM(s) Oral two times a day PRN anxiety  digoxin  Injectable 125 MICROGram(s) IV Push once PRN HR> 130      RADIOLOGY/ADDITIONAL STUDIES:   HPI:     Pt is a 89y old Female with hx of presented from rehab w/ slurred speech that she had woken up w/ and was outside window of TPA.  Since she was admitted found to be covid + and now acute on chronic anemia possibly d/t acute blood loss.  She is also w/ GIOVANNA .     Pt is very tearful and A+ox 3 but was very nervous and scared and stated she wasnt really sure whats going on but she's very affraid of dying and asked me "is this the end??"     We discussed her blood was lower and she was receiving  a blood transfusion at this time.     emotional support provided and she stated team could talk to her children regarding her medical care          PAIN: ( )Yes   ( x)No  Level:\   DYSPNEA: ( ) Yes  (x ) No  Level:    PAST MEDICAL & SURGICAL HISTORY:  COPD without exacerbation      CAD (coronary artery disease)      CVA (cerebrovascular accident)      Basal cell carcinoma      HLD (hyperlipidemia)      HTN (hypertension)      Anterolisthesis      Degeneration, intervertebral disc, cervical      History of tonsillectomy          SOCIAL HX:    Hx opiate tolerance ( )YES  (x )NO    Baseline ADLs  (Prior to Admission)  ( ) Independent   ( x)Dependent    FAMILY HISTORY:  FH: pancreatic cancer (Mother)        Review of Systems:    Anxiety- nervous and tearful   Depression- denied  Physical Discomfort- denied  Dyspnea- denied  Constipation- denied  Diarrhea- +   Nausea- denied  Vomiting- denied  Anorexia- ++  Weight Loss- denied   Cough-  ++  Secretions- none  Fatigue- ++   Weakness- +  Delirium- -    All other systems reviewed and negative       PHYSICAL EXAM:    Vital Signs Last 24 Hrs  T(C): 36.9 (28 Dec 2022 20:40), Max: 36.9 (28 Dec 2022 20:40)  T(F): 98.4 (28 Dec 2022 20:40), Max: 98.4 (28 Dec 2022 20:40)  HR: 89 (28 Dec 2022 20:40) (72 - 137)  BP: 134/55 (28 Dec 2022 20:40) (103/46 - 135/57)  BP(mean): --  RR: 20 (28 Dec 2022 20:40) (18 - 30)  SpO2: 99% (28 Dec 2022 20:40) (97% - 100%)    Parameters below as of 28 Dec 2022 20:40  Patient On (Oxygen Delivery Method): nasal cannula      Daily     Daily     PPSV2:   %  FAST:    General: tearful nervous  Mental Status: awake alert oriented x3  HEENT: eomi, perrl  Lungs: ctabl b/l bs  Cardiac: s1s2 no mgr  GI: soft nontender +BS  : voids  Ext: echymosis b/l upper extremities  Neuro: no gross findings     LABS:                        7.1    10.60 )-----------( 252      ( 28 Dec 2022 07:47 )             22.3     12-28    144  |  117<H>  |  102<H>  ----------------------------<  153<H>  5.4<H>   |  16<L>  |  4.01<H>    Ca    9.0      28 Dec 2022 07:47    TPro  5.5<L>  /  Alb  2.2<L>  /  TBili  0.4  /  DBili  x   /  AST  16  /  ALT  22  /  AlkPhos  60  12-27    PT/INR - ( 27 Dec 2022 08:33 )   PT: 14.1 sec;   INR: 1.21 ratio         PTT - ( 27 Dec 2022 17:24 )  PTT:30.6 sec  Albumin: Albumin, Serum: 2.2 g/dL (12-27 @ 08:33)      Allergies    No Known Allergies    Intolerances      MEDICATIONS  (STANDING):  aspirin  chewable 81 milliGRAM(s) Oral daily  atorvastatin 40 milliGRAM(s) Oral at bedtime  budesonide  80 MICROgram(s)/formoterol 4.5 MICROgram(s) Inhaler 2 Puff(s) Inhalation two times a day  cefepime  Injectable. 1000 milliGRAM(s) IV Push every 24 hours  cholecalciferol 1000 Unit(s) Oral daily  diltiazem Infusion 5 mG/Hr (5 mL/Hr) IV Continuous <Continuous>  dorzolamide 2% Ophthalmic Solution 1 Drop(s) Both EYES three times a day  ferrous    sulfate 325 milliGRAM(s) Oral daily  latanoprost 0.005% Ophthalmic Solution 1 Drop(s) Both EYES at bedtime  metoprolol tartrate 25 milliGRAM(s) Oral three times a day  polyethylene glycol 3350 17 Gram(s) Oral daily    MEDICATIONS  (PRN):  acetaminophen     Tablet .. 650 milliGRAM(s) Oral every 6 hours PRN Temp greater or equal to 38C (100.4F), Mild Pain (1 - 3)  ALPRAZolam 0.25 milliGRAM(s) Oral two times a day PRN anxiety  digoxin  Injectable 125 MICROGram(s) IV Push once PRN HR> 130      RADIOLOGY/ADDITIONAL STUDIES:

## 2022-12-28 NOTE — PROGRESS NOTE ADULT - SUBJECTIVE AND OBJECTIVE BOX
REASON FOR VISIT: AF    HPI:  89 year old woman with a history of CAD, COPD, CVA, HLD, HTN, severe mitral stenosis, recent COVID-19 infection admitted with subacute CVA and a new diagnosis of AF.    12/26/22.  No new complaints.  12/27/22 frail deconditioned appearing tele AF , pressure low, Drop in H&H noted   12/28/22 Frail ill appearing tele AF rates poorly controlled       MEDICATIONS  (STANDING):  aspirin  chewable 81 milliGRAM(s) Oral daily  atorvastatin 40 milliGRAM(s) Oral at bedtime  budesonide  80 MICROgram(s)/formoterol 4.5 MICROgram(s) Inhaler 2 Puff(s) Inhalation two times a day  cefepime  Injectable. 1000 milliGRAM(s) IV Push every 24 hours  cholecalciferol 1000 Unit(s) Oral daily  diltiazem Infusion 5 mG/Hr (5 mL/Hr) IV Continuous <Continuous>  dorzolamide 2% Ophthalmic Solution 1 Drop(s) Both EYES three times a day  ferrous    sulfate 325 milliGRAM(s) Oral daily  furosemide   Injectable 20 milliGRAM(s) IV Push once  latanoprost 0.005% Ophthalmic Solution 1 Drop(s) Both EYES at bedtime  polyethylene glycol 3350 17 Gram(s) Oral daily    MEDICATIONS  (PRN):  acetaminophen     Tablet .. 650 milliGRAM(s) Oral every 6 hours PRN Temp greater or equal to 38C (100.4F), Mild Pain (1 - 3)  ALPRAZolam 0.25 milliGRAM(s) Oral two times a day PRN anxiety  digoxin  Injectable 125 MICROGram(s) IV Push once PRN HR> 130        Vital Signs Last 24 Hrs  T(C): 36.8 (28 Dec 2022 16:17), Max: 36.8 (28 Dec 2022 16:17)  T(F): 98.2 (28 Dec 2022 16:17), Max: 98.2 (28 Dec 2022 16:17)  HR: 91 (28 Dec 2022 16:17) (72 - 137)  BP: 118/70 (28 Dec 2022 16:17) (103/46 - 135/57)  BP(mean): --  RR: 19 (28 Dec 2022 15:53) (18 - 30)  SpO2: 100% (28 Dec 2022 16:17) (97% - 100%)    Parameters below as of 28 Dec 2022 16:17  Patient On (Oxygen Delivery Method): nasal cannula  O2 Flow (L/min): 3        PHYSICAL EXAM:  Constitutional: Awake alert frail poor skin turgor   Respiratory: Non-labored fine base crackles   Cardiovascular: irregular tachycardic  Gastrointestinal: softly distended, nontender.   Extremities: + LE Edema  LABS:                    10.8   11.87 )-----------( 251      ( 26 Dec 2022 08:33 )             33.7     140  |  113<H>  |  71<H>  ----------------------------<  157<H>  5.4<H>   |  17<L>  |  2.55<H>    Ca    9.0      26 Dec 2022 08:33  Phos  4.0     12-24  Mg     2.3     12-24    TPro  6.1  /  Alb  2.1<L>  /  TBili  0.4  /  DBili  x   /  AST  19  /  ALT  21  /  AlkPhos  74  12-26    TroponinI hsT: 4851.10, 4870.41, 44.66         MR Head No Cont (12.25.22 @ 10:34): Subacute infarct suspected involving the high right posterior frontal cortical/subcortical region.    TTE Echo Complete w/o Contrast w/ Doppler (12.15.21 @ 14:49) >   Severe mitral annular calcification is present. Severe mitral stenosis is present. Mild (1+) mitral regurgitation is present.   Mild to moderate aortic stenosis is present.   The left atrium is moderately dilated.   Endocardium is not well visualized, however, overall left ventricular systolic function appears normal as seen in limited views.  Estimated left ventricular ejection fraction is 55 %.   Mild concentric left ventricular hypertrophy is present.    Tele: AF w/ RVR

## 2022-12-28 NOTE — PROGRESS NOTE ADULT - ASSESSMENT
The patient is an 89 yo female with Hx. of CAD, COPD, CVA R inferior cerebellar infarct, HLD, HTN, cervical spondylosis, Hospitalized in  on 12/22 for weakness sec. to COVID 19 infection , was discharged to [facility] for CED on 12/22/22, admitted on 12/24 from [facility] for evaluation of slurred speech; upon admission was found to be in rapid afib; history per medical record as patient unable to provide history.     1. Patient admitted with pneumonia, superimposed on COVID-19 infection; noted with GIOVANNA, creatinine clearance less than 30  - follow up cultures   - serial cbc and monitor temperature   - reviewed prior medical records to evaluate for resistant or atypical pathogens   - iv hydration and supportive care   - day #5 cefepime, renally dosed  - tolerating antibiotics without rashes or side effects   - have stopped dexamethasone  - continue isolation  2. other issues; per medicine

## 2022-12-28 NOTE — PROGRESS NOTE ADULT - SUBJECTIVE AND OBJECTIVE BOX
CC: CVA, MI, Atrial fib.,     HPI: 89 yo female with Hx. of CAD, COPD, CVA R inferior cerebellar infarct, HLD, HTN, cervical spondylosis, Hospitalized in  on 12/22 for weakness sec. to COVID 19 infection , was discharged to [facility] for CED on 12/22/22, was sent to the ER this am for slurred speech. The patient son states he did a face time video with her around 11 am and found her to have slurred speech and hoarse voice.  In the ER she was found with new onset Atrial fib with RVR , Left sided weakness,  was treated with IV Cardizem by ER MD. Low Bp treated with IV NS.  Troponin 4870,     INTERVAL HPI/ OVERNIGHT EVENTS: Pt was seen and examined,  family at bedside, pt looks anxious but reports no pain, no SOB. D/w Pts daughter and grand son ( hematology NP) pts condition and plan. Explained indications for blood transfusion. Pts son at bedside. All agree on transfusion. Also family requesting Palliative team eval         Vital Signs Last 24 Hrs  T(C): 36.6 (27 Dec 2022 08:40), Max: 36.6 (27 Dec 2022 08:40)  T(F): 97.8 (27 Dec 2022 08:40), Max: 97.8 (27 Dec 2022 08:40)  HR: 99 (27 Dec 2022 08:40) (99 - 114)  BP: 101/74 (27 Dec 2022 08:40) (101/74 - 118/57)  RR: 20 (27 Dec 2022 08:40) (18 - 20)  SpO2: 100% (27 Dec 2022 08:40) (98% - 100%)    Parameters below as of 27 Dec 2022 08:40  Patient On (Oxygen Delivery Method): nasal cannula      REVIEW OF SYSTEMS:  All other review of systems is negative unless indicated above.      PHYSICAL EXAM:  General: Well developed; in no acute distress, on NC, mildly dyspneic   Eyes:  EOMI; conjunctiva and sclera clear  Head: Normocephalic; atraumatic  ENMT: No nasal discharge; airway clear  Neck: Supple; non tender; no masses  Respiratory: Diminished BS b/l, mild crackles at bases, no wheezes  Cardiovascular: Irregular rate and rhythm. S1 and S2 Normal; + Murmur   Gastrointestinal: Soft non-tender non-distended; Normal bowel sounds  Genitourinary: No  suprapubic  tenderness  Extremities: + edema  Vascular: Peripheral pulses palpable 2+ bilaterally  Neurological: Alert and oriented x 2-3, non focal   Skin: Warm and dry. Multiple  ecchymotic changes UE b/l.   Musculoskeletal: Normal muscle tone, without deformities  Psychiatric: Cooperative and anxious     LABS:                           7.1    10.60 )-----------( 252      ( 28 Dec 2022 07:47 )             22.3     12-28    144  |  117<H>  |  102<H>  ----------------------------<  153<H>  5.4<H>   |  16<L>  |  4.01<H>    Ca    9.0      28 Dec 2022 07:47    TPro  5.5<L>  /  Alb  2.2<L>  /  TBili  0.4  /  DBili  x   /  AST  16  /  ALT  22  /  AlkPhos  60  12-27      LIVER FUNCTIONS - ( 27 Dec 2022 08:33 )  Alb: 2.2 g/dL / Pro: 5.5 gm/dL / ALK PHOS: 60 U/L / ALT: 22 U/L / AST: 16 U/L / GGT: x         PT/INR - ( 27 Dec 2022 08:33 )   PT: 14.1 sec;   INR: 1.21 ratio      PTT - ( 27 Dec 2022 17:24 )  PTT:30.6 sec                          7.9    x     )-----------( x        ( 27 Dec 2022 14:03 )             24.4     27 Dec 2022 08:33    141    |  115    |  85     ----------------------------<  171    5.4     |  17     |  3.49     Ca    8.9        27 Dec 2022 08:33    TPro  5.5    /  Alb  2.2    /  TBili  0.4    /  DBili  x      /  AST  16     /  ALT  22     /  AlkPhos  60     27 Dec 2022 08:33    PT/INR - ( 27 Dec 2022 08:33 )   PT: 14.1 sec;   INR: 1.21 ratio         PTT - ( 27 Dec 2022 08:33 )  PTT:128.6 sec  CAPILLARY BLOOD GLUCOSE        LIVER FUNCTIONS - ( 27 Dec 2022 08:33 )  Alb: 2.2 g/dL / Pro: 5.5 gm/dL / ALK PHOS: 60 U/L / ALT: 22 U/L / AST: 16 U/L / GGT: x               MEDICATIONS  (STANDING):  aspirin  chewable 81 milliGRAM(s) Oral daily  atorvastatin 40 milliGRAM(s) Oral at bedtime  budesonide  80 MICROgram(s)/formoterol 4.5 MICROgram(s) Inhaler 2 Puff(s) Inhalation two times a day  cefepime  Injectable. 1000 milliGRAM(s) IV Push every 24 hours  cholecalciferol 1000 Unit(s) Oral daily  dexAMETHasone  Injectable 6 milliGRAM(s) IV Push daily  diltiazem Infusion 5 mG/Hr (5 mL/Hr) IV Continuous <Continuous>  dorzolamide 2% Ophthalmic Solution 1 Drop(s) Both EYES three times a day  ferrous    sulfate 325 milliGRAM(s) Oral daily  heparin  Infusion.  Unit(s)/Hr (14 mL/Hr) IV Continuous <Continuous>  latanoprost 0.005% Ophthalmic Solution 1 Drop(s) Both EYES at bedtime  polyethylene glycol 3350 17 Gram(s) Oral daily  sodium chloride 0.9%. 1000 milliLiter(s) (75 mL/Hr) IV Continuous <Continuous>    MEDICATIONS  (PRN):  acetaminophen     Tablet .. 650 milliGRAM(s) Oral every 6 hours PRN Temp greater or equal to 38C (100.4F), Mild Pain (1 - 3)  digoxin  Injectable 125 MICROGram(s) IV Push once PRN HR> 130  heparin   Injectable 6500 Unit(s) IV Push every 6 hours PRN For aPTT less than 40  heparin   Injectable 3000 Unit(s) IV Push every 6 hours PRN For aPTT between 40 - 57      RADIOLOGY & ADDITIONAL TESTS:        ACC: [id number] EXAM:  US KIDNEYS AND BLADDER                        PROCEDURE DATE:  12/26/2022    FINDINGS:  Right kidney: 9.2 cm. Limited. No hydronephrosis.    Left kidney: 9.6 cm. Limited. No hydronephrosis.    Urinary bladder: Unremarkable.    IMPRESSION:    Limited study.    No hydronephrosis.            ACC: [id number] EXAM:  MR BRAIN                          *** ADDENDUM***    Please note area of abnormal T2 prolongation with restricted diffusion is   seen involving the high right posterior frontal cortical and subcortical   region not the left side as stated in the body of the report.    --- End of Report ---    *** END OF ADDENDUM***      PROCEDURE DATE:  12/25/2022          INTERPRETATION:  Clinical indication: Dysarthria. Left facial droop.    MRI of the brain was performed using sagittal T1 axial T1 T2 T2 FLAIR   diffusion and susceptibility weighted sequence    This exam is compared with prior brain MRI performed on January 14, 2015   and prior CT scan performed on December 24, 2022.    Extensive parenchymal volume loss and chronic microvascular ischemic   changes are identified.    There is evidence of a new vague area of T2 prolongation and restricted   diffusion seen involving the high left posterior frontal   cortical/subcortical region. This is best seen on series 5 image 51 and   does demonstrate decreased signal on the ADC mapping. This could be   compatible with a subacute infarct. No hemorrhagic transformation is   seen. No significant shift or herniation is seen.    Encephalomalacia and gliosis involving the inferiorright cerebellar   region is identified. This chronic appearing infarct has demonstrated   expected evolutionary changes when compared with the prior brain MRI.    There are no abnormal intra or extra-axial collections seen.    The large vessels demonstrate normal flow-voids.    Extensive mucosal thickening is seen involving both sphenoid sinus with   air-fluid levels involving the right sphenoid sinus. Bilateral ethmoid   sinus mucosal thickening is seen. The patient is status post bilateral   cataract surgery. Both mastoid and middle ear regions appear clear.    IMPRESSION: Subacute infarct suspected involving the high right posterior   frontal cortical/subcortical region.        < from: CT Chest No Cont (12.27.22 @ 16:41) >    ACC: [id number] EXAM:  CT ABDOMEN AND PELVIS                        ACC: [id number] EXAM:  CT CHEST                          PROCEDURE DATE:  12/27/2022          INTERPRETATION:  CLINICAL INFORMATION: Anemia    COMPARISON: CT chest 12/15/2021    CONTRAST/COMPLICATIONS:  IV Contrast: NONE  Oral Contrast: NONE  Complications: None reported at time of study completion    PROCEDURE:  CT of the Chest, Abdomen and Pelvis was performed.  Sagittal and coronal reformats were performed.    FINDINGS:  CHEST:  LUNGS AND LARGE AIRWAYS: Patent central airways. A 4 mm left upper lobe   nodule, new since prior study (2:12). Tree-in-bud nodules in bilateral   lower lobes. Patchy groundglass opacities in the bilateral upper lobes.  PLEURA: No pleural effusion.  VESSELS: Atherosclerotic changes of the aorta and coronary arteries.  HEART: Heart size is normal. No pericardial effusion.  MEDIASTINUM AND CRISTHIAN: No lymphadenopathy. Moderate hiatal hernia  CHEST WALL AND LOWER NECK: Heterogeneous thyroid gland..    ABDOMEN AND PELVIS: Motion degradation limits evaluation of the bowel.  LIVER: Within normal limits.  BILE DUCTS: Normal caliber.  GALLBLADDER: Vicarious excretion of contrast.  SPLEEN: Within normal limits.  PANCREAS: Within normal limits.  ADRENALS:Within normal limits.  KIDNEYS/URETERS: Persistent nephrograms. Exophytic hypodense right   interpolar lesion measures 1.7 cm, previously noted to be hyperdense.    BLADDER: Within normal limits.  REPRODUCTIVE ORGANS: A 2.2 cm right adnexal cyst. 1.4cm left adnexal   cyst. Calcified fibroid.    BOWEL: No bowel obstruction. Sigmoid diverticulosis. Normal appendix. The   bowel is poorly evaluated due to motion artifact.  PERITONEUM: No ascites.  VESSELS: Within normal limits.  RETROPERITONEUM/LYMPH NODES: Moderate presacral edema. No lymphadenopathy.  ABDOMINAL WALL: Within normal limits.  BONES: Degenerative changes.    IMPRESSION:  Bilateral lower lobe tree-in-bud nodules and patchy groundglass opacities   may represent atypical infection.    New 4 mm left upper lobe pulmonary nodule. Recommend attention on   follow-up.    Motion limited images of the abdomen. Grossly no bowel obstruction.    Persistent bilateral nephrograms. Patient received IV contrast 12/24/2022   for CTA Head and Neck. Recommend monitoring patient's serum creatinine   level to monitor for contrast-induced nephropathy.

## 2022-12-28 NOTE — PROVIDER CONTACT NOTE (OTHER) - ACTION/TREATMENT ORDERED:
IVP 5 mg metoprolol. No change to cardizem drip.  Pt HR returned to 70s. Anxiety resolved. Pt states she feels much better and is resting comfortably.

## 2022-12-28 NOTE — PROGRESS NOTE ADULT - ASSESSMENT
89 yo female with Hx. of CAD, COPD, CVA R inferior cerebellar infarct, HLD, HTN, cervical spondylosis admitted for:       # New Onset Atrial fibrillation with RVR  - c/w tele, rate is better now 90-100s, but had episode of RVT in 130s this am   - C/w  cardizem  drip  - D/e cardio will restart PO  metoprolol, BP better   - hold heparin due to  drop in H/H       # Subacute CVA  - not a candidate for TPA on admission   - MRI brain:  Subacute infarct suspected involving the high right posterior frontal cortical/subcortical region.  - C/w ASA  - C/w statin   - Speech pathology  - PT  - Hold a/c  for now       # Elevated troponin,  NSTEMI type II  in settings of acute infection and  AFIB   ECHO:  `+2-3 MS, EF 65-70%, No RWMA, + LVH, severe AS  C/w ASA, hold heparin drip for now   C/w statin  restart metoprolol   D/w Cardio , conservative management     # Acute Hypoxic respiratory failure due top COVID 19 PNA  -  On 2L NC, monitor pulse ox   - Not candidate for Remdesivir due to GIOVANNA   - d/c Decadron   - supportive care    # Acute renal failure due to contrast nephropathy   # Hyperkalemia  - monitor renal function, still trending up  -d/c  gentle IVF , will transfuse   - suspect Urinary retention, bladder scan and straight cath d/w RN   - D/w DR Awan     # 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

## 2022-12-28 NOTE — PROVIDER CONTACT NOTE (OTHER) - ASSESSMENT
Afib RVR, sustained 's
Pt tachypneic but denies SOB or difficulty breathing. Pt states she is very nervous and scared but no other complaints.

## 2022-12-28 NOTE — PROGRESS NOTE ADULT - PROBLEM SELECTOR PLAN 2
HR poorly controlled pressure improved will begin beta Blocker wean cardizem GTT   Heparin held 2/2 anemia  Digoxin not an option in setting of renal failure HR poorly controlled pressure improved will begin beta Blocker wean Cardizem GTT   Heparin held 2/2 anemia  Digoxin not an option in setting of renal failure

## 2022-12-28 NOTE — PROGRESS NOTE ADULT - SUBJECTIVE AND OBJECTIVE BOX
Date of service: 12-28-22 @ 10:51      Patient lying in bed; afebrile, less cough      ROS unable to obtain secondary to patient medical condition     MEDICATIONS  (STANDING):  aspirin  chewable 81 milliGRAM(s) Oral daily  atorvastatin 40 milliGRAM(s) Oral at bedtime  budesonide  80 MICROgram(s)/formoterol 4.5 MICROgram(s) Inhaler 2 Puff(s) Inhalation two times a day  cefepime  Injectable. 1000 milliGRAM(s) IV Push every 24 hours  cholecalciferol 1000 Unit(s) Oral daily  diltiazem Infusion 5 mG/Hr (5 mL/Hr) IV Continuous <Continuous>  dorzolamide 2% Ophthalmic Solution 1 Drop(s) Both EYES three times a day  ferrous    sulfate 325 milliGRAM(s) Oral daily  latanoprost 0.005% Ophthalmic Solution 1 Drop(s) Both EYES at bedtime  polyethylene glycol 3350 17 Gram(s) Oral daily  sodium chloride 0.9%. 1000 milliLiter(s) (75 mL/Hr) IV Continuous <Continuous>    MEDICATIONS  (PRN):  acetaminophen     Tablet .. 650 milliGRAM(s) Oral every 6 hours PRN Temp greater or equal to 38C (100.4F), Mild Pain (1 - 3)  digoxin  Injectable 125 MICROGram(s) IV Push once PRN HR> 130      Vital Signs Last 24 Hrs  T(C): 36.3 (27 Dec 2022 21:18), Max: 36.3 (27 Dec 2022 21:18)  T(F): 97.4 (27 Dec 2022 21:18), Max: 97.4 (27 Dec 2022 21:18)  HR: 74 (28 Dec 2022 09:18) (72 - 137)  BP: 135/57 (28 Dec 2022 09:18) (103/46 - 143/56)  BP(mean): 83 (27 Dec 2022 16:00) (77 - 83)  RR: 20 (28 Dec 2022 06:49) (18 - 30)  SpO2: 99% (28 Dec 2022 06:49) (97% - 100%)    Parameters below as of 28 Dec 2022 06:49  Patient On (Oxygen Delivery Method): nasal cannula  O2 Flow (L/min): 1          Physical Exam:            Constitutional: frail looking  HEENT: NC/AT, EOMI, PERRLA, conjunctivae clear; ears and nose atraumatic; pharynx clear  Neck: supple; thyroid not palpable  Back: no tenderness  Respiratory: respiratory effort normal; clear to auscultation  Cardiovascular: S1S2 regular, no murmurs  Abdomen: soft, not tender, not distended, positive BS; no liver or spleen organomegaly  Genitourinary: no suprapubic tenderness  Musculoskeletal: no muscle tenderness, no joint swelling or tenderness  Neurological/ Psychiatric:   moving all extremities  Skin: multiple various skin lesions including on anterior chest, right lower extremity    Labs: all available labs reviewed                          Labs:                        7.1    10.60 )-----------( 252      ( 28 Dec 2022 07:47 )             22.3     12-28    144  |  117<H>  |  102<H>  ----------------------------<  153<H>  5.4<H>   |  16<L>  |  4.01<H>    Ca    9.0      28 Dec 2022 07:47    TPro  5.5<L>  /  Alb  2.2<L>  /  TBili  0.4  /  DBili  x   /  AST  16  /  ALT  22  /  AlkPhos  60  12-27           Cultures:       Culture - Urine (collected 12-25-22 @ 06:05)  Source: Catheterized Catheterized  Final Report (12-26-22 @ 06:29):    <10,000 CFU/mL Normal Urogenital Katie    Culture - Blood (collected 12-21-22 @ 16:58)  Source: .Blood None  Final Report (12-26-22 @ 23:00):    No Growth Final    Culture - Blood (collected 12-21-22 @ 16:14)  Source: .Blood Blood-Peripheral  Final Report (12-26-22 @ 22:00):    No Growth Final    Culture - Urine (collected 12-21-22 @ 14:40)  Source: Clean Catch Clean Catch (Midstream)  Final Report (12-22-22 @ 15:32):    <10,000 CFU/mL Normal Urogenital Katie      D-Dimer Assay, Quantitative: 279 ng/mL DDU (12-22-22 @ 09:14)            Flu With COVID-19 By DOROTHEA (12.21.22 @ 16:14)    SARS-CoV-2 Result: Detected: This Respiratory Panel uses polymerase chain reaction (PCR) to detect for  influenza A; influenza B; respiratory syncytial virus; and SARS-CoV-2.  This test was validated by [facility] and is in use under the FDA  Emergency Use Authorization (EUA) for clinical labs CLIA-certified to  perform high complexity testing. Test results should be correlated with  clinical presentation, patient history, and epidemiology.    Influenza A Result: NotDetec    Influenza B Result: NotDetec    Resp Syn Virus Result: NotDetec          < from: Xray Chest 1 View- PORTABLE-Urgent (12.24.22 @ 14:26) >  ACC: [id number] EXAM:  XR CHEST PORTABLE URGENT 1V                          PROCEDURE DATE:  12/24/2022          INTERPRETATION:  Portable chest radiograph    CLINICAL INFORMATION: Altered mental status    TECHNIQUE:  Portable  AP chest radiograph.    COMPARISON: 12/21/2022 chest x-ray .    FINDINGS:  CATHETERS AND TUBES: None    PULMONARY: LEFT medial basilar on airspace consolidation superimposed on   a calcified mitral annulus.  LEFT upper zones and RIGHT lung parenchyma clear. No pneumothorax.    HEART/VASCULAR: The  heart is enlarged in transverse diameter.    BONES: Visualized osseous structures are intact.    IMPRESSION:   Cardiomegaly.  LEFT medial basilar airspace disease superimposed on calcified mitral   annulus.    < end of copied text >            Radiology: all available radiological tests reviewed    Advanced directives addressed: full resuscitation

## 2022-12-29 NOTE — ADVANCED PRACTICE NURSE CONSULT - ASSESSMENT
This is a 89 yo female per MD Note H&P: Hx. of CAD, COPD, CVA R inferior cerebellar infarct, HLD, HTN, cervical spondylosis, Hospitalized in  on 12/22 for weakness sec. to COVID 19 infection , was discharged to [place] for CED on 12/22/22, was sent to the ER this am for slurred speech. The patient son states he did a facetime video with her around 11 am and found her to have slurred speech and hoarse voice.   Assessed patient on 3 North and noted bilateral lower extremities with Hemosiderin staining and dry scaling skin. Recommend applying Sween 24 daily to dry skin.   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 The irregular hypergranulation has that appearance. Recommend applying Xeroform and cover with  Xeroform. Triad cream to periwound. Wrap with Mitchel. Daily change and prn if soiled.   see recommendations for pressure injury prevention

## 2022-12-29 NOTE — PROGRESS NOTE ADULT - ASSESSMENT
89 yo female with Hx. of CAD, COPD, CVA R inferior cerebellar infarct, HLD, HTN, cervical spondylosis admitted for:       # New Onset Atrial fibrillation with RVR  - c/w tele, rate is better now 90-100s, but had episode of RVT in 130s this am   - C/w  cardizem  drip  - D/e cardio will restart PO  metoprolol, BP better   - hold heparin due to  drop in H/H       # Subacute CVA  - not a candidate for TPA on admission   - MRI brain:  Subacute infarct suspected involving the high right posterior frontal cortical/subcortical region.  - C/w ASA  - C/w statin   - Speech pathology  - PT  - Hold a/c  for now       # Elevated troponin,  NSTEMI type II  in settings of acute infection and  AFIB   ECHO:  `+2-3 MS, EF 65-70%, No RWMA, + LVH, severe AS  C/w ASA, hold heparin drip for now   C/w statin  restart metoprolol   D/w Cardio , conservative management     # Acute Hypoxic respiratory failure due top COVID 19 PNA  -  On 2L NC, monitor pulse ox   - Not candidate for Remdesivir due to GIOVANNA   - d/c Decadron   - supportive care    # Acute renal failure due to contrast nephropathy   # Hyperkalemia  - monitor renal function, still trending up  -d/c  gentle IVF , will transfuse   - suspect Urinary retention, bladder scan and straight cath d/w RN   - D/w DR Awan     # 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 of CAD, COPD, CVA R inferior cerebellar infarct, HLD, HTN, cervical spondylosis admitted for:       # New Onset Atrial fibrillation with RVR  - c/w tele: In SR now HR in 80s   - Titrate down off   cardizem  drip  - C/w PO metoprolol, BP better   - will retart on heparin  drip if repeat  H/H stable  -D/w Dr Gonzalez         # Subacute CVA  - not a candidate for TPA on admission   - MRI brain:  Subacute infarct suspected involving the high right posterior frontal cortical/subcortical region.  - C/w ASA  - C/w statin   - Speech pathology  - PT  - restart A/c if H/H stable      # Elevated troponin,  NSTEMI type II  in settings of acute infection and  AFIB   ECHO:  `+2-3 MS, EF 65-70%, No RWMA, + LVH, severe AS  C/w ASA, hold heparin drip for now   C/w statin  restart metoprolol   D/w Cardio , conservative management     # Acute Hypoxic respiratory failure due to  COVID 19 PNA  -  On 2L NC, monitor pulse ox   - Not candidate for Remdesivir due to GIOVANNA   - off  Decadron   - supportive care    # Acute renal failure due to contrast nephropathy   # Hyperkalemia  - monitor renal function, plateaued   -s/p IVF , 1U PRBcs   - pt retaining  Urine, will place rowley   - D/w DR Awan     # Acute On Chronic  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

## 2022-12-29 NOTE — PROGRESS NOTE ADULT - ASSESSMENT
89 yo woman PMHx CAD, COPD, CVA R inferior cerebellar infarct, HLD, HTN, cervical spondylosis readmitted with slurred speech, mild left facial weakness, frail. MRI brain shows acute stroke in the right high posterior frontal region, likely embolic in view of new afib, candidate for chronic anticoagulation, when stable.  Suggest:  -continue f/u BP, aim systolic <140 control  -continue Aspirin, Statin  -supportive care  -? palliative care

## 2022-12-29 NOTE — CONSULT NOTE ADULT - ASSESSMENT
89 CAD, COPD, CVA R inferior cerebellar infarct, HLD, HTN, cervical spondylosis, Hospitalized in  on 12/22 for weakness sec. to COVID 19 infection , was discharged to [facility] for CED on 12/22/22, was sent to the ER this am for slurred speech with renal evaluation of GIOVANNA.    GIOVANNA  likely secondary to contrast, hemodynamic as well with AF/CVA  Keep ivf  May  need lokelma  Would not be a good candidate for aggressive renal care with multi co-morbodities  No further contrast/NSAID's  Optimize intake    AFIB  -CVS  -CCB  -Maintain map to optimize renal perfusion    Hyperkalemia  -Monitor values  -limit k in diet  -may need lokelma    d/c with rn staff, dr Gupta  
 The patient is an 89 yo female with Hx. of CAD, COPD, CVA R inferior cerebellar infarct, HLD, HTN, cervical spondylosis, Hospitalized in  on 12/22 for weakness sec. to COVID 19 infection , was discharged to [facility] for CED on 12/22/22, admitted on 12/24 from [facility] for evaluation of slurred speech; upon admission was found to be in rapid afib; history per medical record as patient unable to provide history.     1. Patient admitted with pneumonia, superimposed on COVID-19 infection; noted with GIOVANNA, creatinine clearance less than 30  - follow up cultures   - serial cbc and monitor temperature   - reviewed prior medical records to evaluate for resistant or atypical pathogens   - iv hydration and supportive care   - can continue cefepime but will adjust dose given renal function  - started on dexamethasone, but hold on remdesivir given renal function  - continue isolation  2. other issues; per medicine
Process of Care  --Reviewed dx/treatment problems and alignment with Goals of Care    Physical Aspects of Care  --Pain  patient denies at this time  c/w current managment    --Bowel Regimen  denies constipation  risk for constipation d/t immobility  daily dulcolax    --Dyspnea  No SOB at this time  comfortable and in NAD    --Nausea Vomiting  denies    --Weakness  PT as tolerated     Psychological and Psychiatric Aspects of Care:   --Greif/Bereavment: emotional support provided  --Hx of psychiatric dx: none  -Pastoral Care Available PRN     Social Aspects of Care  -SW involved     Cultural Aspects  -Primary Language: English    Goals of Care:     We discussed Palliative Care team being a supportive team when a patient has ongoing illnesses.  We also discussed that it is not an end of life care service, but can help navigate symptoms and emotional support througout their hospital stay here.       Ethical and Legal Aspects:   NA        Capacity: pt w/ simple capacity  HCP/Surrogate: all children equally (no HCP on chart_      Code Status: dnr dni  MOLST:  Dispo Plan: pending further discussions    Discussed With: Case coordinated with attending and SW and RN     Time Spent: 90 minutes including the care, coordination and counseling of this patient, 50% of which was spent coordinating and counseling. 
Process of Care  --Reviewed dx/treatment problems and alignment with Goals of Care    Physical Aspects of Care  --Pain  patient denies at this time  c/w current managment    --Bowel Regimen  risk for constipation d/t immobility  daily dulcolax    --Dyspnea  No SOB at this time  comfortable and in NAD  on NC this morning     --Nausea Vomiting  denies    --Weakness  PT as tolerated     Psychological and Psychiatric Aspects of Care:   --Greif/Bereavment: emotional support provided  --Hx of psychiatric dx: none  -Pastoral Care Available PRN     Social Aspects of Care  -SW involved     Cultural Aspects  -Primary Language: English    Goals of Care:     We discussed Palliative Care team being a supportive team when a patient has ongoing illnesses.  We also discussed that it is not an end of life care service, but can help navigate symptoms and emotional support throughout their hospital stay here.    I did call Juan w/ patients permission.  He reports he has HCP paperwork he will bring in.  It was explained unless we have a copy all siblings have equal say in decisions if patient looses capacity to maek her own.       Ethical and Legal Aspects:   as above        Capacity: pt w/ simple capacity  HCP/Surrogate: all children equally (no HCP on chart) Vincent Martinez will bring in HCP papers       Code Status: dnr dni  MOLST: on chart  Dispo Plan: pending further discussions    Discussed With: Case coordinated with attending and SW and RN     Time Spent: 45 minutes including the care, coordination and counseling of this patient, 50% of which was spent coordinating and counseling. 
The patient is an 89 yo female with Hx. of CAD, COPD, CVA R inferior cerebellar infarct, HLD, HTN, cervical spondylosis, Hospitalized in  on 12/22 for weakness in the setting of  COVID 19 infection. She was discharged to [facility] for CED on 12/22/22. This morning she was sent to the ER for slurred speech. Her son reported that he had a video call with her at approximately 11 AM at which time he found that her speech was slurred and her voice was hoarse.   The ED physician spoke with a nurse at [facility] who stated that her speech was slurred upon awakening at 7:30 AM. Last known normal was unclear.   In the ED she was found to have atrial fibrillation and RVR.  Telestroke was consulted by the ED.  She was not a candidate for tpa due to no last known normal and did not have a large vessel occlusion seen on imaging.  It was recommended to hold off on anticoagulation for now and to allow for permissive hypertension.    Dysarthria, left facial droop, left arm weakness  -Likely acute stroke  -Was not a candidate for tpa  -In new afib  -MRI brain to be performed  -Rate control without significant lowering of BP (permissive hypertension for now)  -Aspirin for now. If no large stroke on MRI may be able to start AC relatively soon  -Statin  -lipid panel was checked on 12/22  -Check HbA1C  -DVT prophylaxis  -Speech pathology  -PT  -Echo, will f/u on cardiology recs    Supportive care for covid-19 infection    Leukocytosis  -Check Ua  -CXR appreciated    Will follow

## 2022-12-29 NOTE — CONSULT NOTE ADULT - CONVERSATION DETAILS
We discussed Palliative Care team being a supportive team when a patient has ongoing illnesses.  We also discussed that it is not an end of life care service, but can help navigate symptoms and emotional support throughout their hospital stay here.       We discussed patients current diagnosis, and updated her on status of today.     I did ask if pt had written formal HCP and Juan stated he had it at home and would bring it in today.       If a patient lacks capacity to make their own medical decisions and there is NO HCP form available      As per Family Health Care Decision Act: The order of surrogate decision makers is as follows  1. Legal guardian appointed under Article 81   2. spouse or domestic partner   *3. Adult children equally*  4. Parent  5. Sibling  6. Close friend, age 18 or over, or a relative other than those listed above       He understood and would bring in the HCP
We discussed Palliative Care team being a supportive team when a patient has ongoing illnesses.  We also discussed that it is not an end of life care service, but can help navigate symptoms and emotional support throughout their hospital stay here.     Patient is very terful and goc touched upon.      At this time open to eams discussing care w/ children.  Will reach out and arrange further goc discussion    at this time pt is dnr dni

## 2022-12-29 NOTE — PROGRESS NOTE ADULT - PROBLEM SELECTOR PLAN 2
HR improved with Beta Blockers   Continue to wean and D/C Cardizem GTT  Digoxin not an option in setting of renal failure    H&H improved will repeat later today if remains stable will resume heparin and transition to oral AC HR improved with Beta Blockers   Continue to wean and D/C Cardizem GTT optimize HR   Digoxin not an option in setting of renal failure    H&H improved will repeat later today if remains stable will resume heparin and transition to oral AC

## 2022-12-29 NOTE — CONSULT NOTE ADULT - CONSULT REQUESTED DATE/TIME
25-Dec-2022 13:53
25-Dec-2022 11:37
27-Dec-2022 11:29
24-Dec-2022 21:00
28-Dec-2022 21:50
29-Dec-2022 12:34

## 2022-12-29 NOTE — PROGRESS NOTE ADULT - SUBJECTIVE AND OBJECTIVE BOX
CC: CVA, MI, Atrial fib.,     HPI: 89 yo female with Hx. of CAD, COPD, CVA R inferior cerebellar infarct, HLD, HTN, cervical spondylosis, Hospitalized in  on 12/22 for weakness sec. to COVID 19 infection , was discharged to [place] for CED on 12/22/22, was sent to the ER this am for slurred speech. The patient son states he did a face time video with her around 11 am and found her to have slurred speech and hoarse voice.  In the ER she was found with new onset Atrial fib with RVR , Left sided weakness,  was treated with IV Cardizem by ER MD. Low Bp treated with IV NS.  Troponin 4870,     INTERVAL HPI/ OVERNIGHT EVENTS: Pt was seen and examined,          Vital Signs Last 24 Hrs  T(C): 36.9 (28 Dec 2022 20:40), Max: 36.9 (28 Dec 2022 20:40)  T(F): 98.4 (28 Dec 2022 20:40), Max: 98.4 (28 Dec 2022 20:40)  HR: 72 (29 Dec 2022 09:00) (71 - 101)  BP: 140/65 (29 Dec 2022 08:15) (118/70 - 140/65)  RR: 22 (29 Dec 2022 08:15) (19 - 24)  SpO2: 95% (29 Dec 2022 09:00) (95% - 100%)    Parameters below as of 29 Dec 2022 09:00  Patient On (Oxygen Delivery Method): nasal cannula        REVIEW OF SYSTEMS:  All other review of systems is negative unless indicated above.      PHYSICAL EXAM:  General: Well developed; in no acute distress, on NC, mildly dyspneic   Eyes:  EOMI; conjunctiva and sclera clear  Head: Normocephalic; atraumatic  ENMT: No nasal discharge; airway clear  Neck: Supple; non tender; no masses  Respiratory: Diminished BS b/l, mild crackles at bases, no wheezes  Cardiovascular: Irregular rate and rhythm. S1 and S2 Normal; + Murmur   Gastrointestinal: Soft non-tender non-distended; Normal bowel sounds  Genitourinary: No  suprapubic  tenderness  Extremities: + edema  Vascular: Peripheral pulses palpable 2+ bilaterally  Neurological: Alert and oriented x 2-3, non focal   Skin: Warm and dry. Multiple  ecchymotic changes UE b/l.   Musculoskeletal: Normal muscle tone, without deformities  Psychiatric: Cooperative and anxious     LABS:                           7.1    10.60 )-----------( 252      ( 28 Dec 2022 07:47 )             22.3     12-28    144  |  117<H>  |  102<H>  ----------------------------<  153<H>  5.4<H>   |  16<L>  |  4.01<H>    Ca    9.0      28 Dec 2022 07:47    TPro  5.5<L>  /  Alb  2.2<L>  /  TBili  0.4  /  DBili  x   /  AST  16  /  ALT  22  /  AlkPhos  60  12-27      LIVER FUNCTIONS - ( 27 Dec 2022 08:33 )  Alb: 2.2 g/dL / Pro: 5.5 gm/dL / ALK PHOS: 60 U/L / ALT: 22 U/L / AST: 16 U/L / GGT: x         PT/INR - ( 27 Dec 2022 08:33 )   PT: 14.1 sec;   INR: 1.21 ratio      PTT - ( 27 Dec 2022 17:24 )  PTT:30.6 sec                          7.9    x     )-----------( x        ( 27 Dec 2022 14:03 )             24.4     27 Dec 2022 08:33    141    |  115    |  85     ----------------------------<  171    5.4     |  17     |  3.49     Ca    8.9        27 Dec 2022 08:33    TPro  5.5    /  Alb  2.2    /  TBili  0.4    /  DBili  x      /  AST  16     /  ALT  22     /  AlkPhos  60     27 Dec 2022 08:33    PT/INR - ( 27 Dec 2022 08:33 )   PT: 14.1 sec;   INR: 1.21 ratio         PTT - ( 27 Dec 2022 08:33 )  PTT:128.6 sec  CAPILLARY BLOOD GLUCOSE        LIVER FUNCTIONS - ( 27 Dec 2022 08:33 )  Alb: 2.2 g/dL / Pro: 5.5 gm/dL / ALK PHOS: 60 U/L / ALT: 22 U/L / AST: 16 U/L / GGT: x               MEDICATIONS  (STANDING):  aspirin  chewable 81 milliGRAM(s) Oral daily  atorvastatin 40 milliGRAM(s) Oral at bedtime  budesonide  80 MICROgram(s)/formoterol 4.5 MICROgram(s) Inhaler 2 Puff(s) Inhalation two times a day  cefepime  Injectable. 1000 milliGRAM(s) IV Push every 24 hours  cholecalciferol 1000 Unit(s) Oral daily  dexAMETHasone  Injectable 6 milliGRAM(s) IV Push daily  diltiazem Infusion 5 mG/Hr (5 mL/Hr) IV Continuous <Continuous>  dorzolamide 2% Ophthalmic Solution 1 Drop(s) Both EYES three times a day  ferrous    sulfate 325 milliGRAM(s) Oral daily  heparin  Infusion.  Unit(s)/Hr (14 mL/Hr) IV Continuous <Continuous>  latanoprost 0.005% Ophthalmic Solution 1 Drop(s) Both EYES at bedtime  polyethylene glycol 3350 17 Gram(s) Oral daily  sodium chloride 0.9%. 1000 milliLiter(s) (75 mL/Hr) IV Continuous <Continuous>    MEDICATIONS  (PRN):  acetaminophen     Tablet .. 650 milliGRAM(s) Oral every 6 hours PRN Temp greater or equal to 38C (100.4F), Mild Pain (1 - 3)  digoxin  Injectable 125 MICROGram(s) IV Push once PRN HR> 130  heparin   Injectable 6500 Unit(s) IV Push every 6 hours PRN For aPTT less than 40  heparin   Injectable 3000 Unit(s) IV Push every 6 hours PRN For aPTT between 40 - 57      RADIOLOGY & ADDITIONAL TESTS:        ACC: [id number] EXAM:  US KIDNEYS AND BLADDER                        PROCEDURE DATE:  12/26/2022    FINDINGS:  Right kidney: 9.2 cm. Limited. No hydronephrosis.    Left kidney: 9.6 cm. Limited. No hydronephrosis.    Urinary bladder: Unremarkable.    IMPRESSION:    Limited study.    No hydronephrosis.            ACC: [id number] EXAM:  MR BRAIN                          *** ADDENDUM***    Please note area of abnormal T2 prolongation with restricted diffusion is   seen involving the high right posterior frontal cortical and subcortical   region not the left side as stated in the body of the report.    --- End of Report ---    *** END OF ADDENDUM***      PROCEDURE DATE:  12/25/2022          INTERPRETATION:  Clinical indication: Dysarthria. Left facial droop.    MRI of the brain was performed using sagittal T1 axial T1 T2 T2 FLAIR   diffusion and susceptibility weighted sequence    This exam is compared with prior brain MRI performed on January 14, 2015   and prior CT scan performed on December 24, 2022.    Extensive parenchymal volume loss and chronic microvascular ischemic   changes are identified.    There is evidence of a new vague area of T2 prolongation and restricted   diffusion seen involving the high left posterior frontal   cortical/subcortical region. This is best seen on series 5 image 51 and   does demonstrate decreased signal on the ADC mapping. This could be   compatible with a subacute infarct. No hemorrhagic transformation is   seen. No significant shift or herniation is seen.    Encephalomalacia and gliosis involving the inferiorright cerebellar   region is identified. This chronic appearing infarct has demonstrated   expected evolutionary changes when compared with the prior brain MRI.    There are no abnormal intra or extra-axial collections seen.    The large vessels demonstrate normal flow-voids.    Extensive mucosal thickening is seen involving both sphenoid sinus with   air-fluid levels involving the right sphenoid sinus. Bilateral ethmoid   sinus mucosal thickening is seen. The patient is status post bilateral   cataract surgery. Both mastoid and middle ear regions appear clear.    IMPRESSION: Subacute infarct suspected involving the high right posterior   frontal cortical/subcortical region.        < from: CT Chest No Cont (12.27.22 @ 16:41) >    ACC: [id number] EXAM:  CT ABDOMEN AND PELVIS                        ACC: [id number] EXAM:  CT CHEST                          PROCEDURE DATE:  12/27/2022          INTERPRETATION:  CLINICAL INFORMATION: Anemia    COMPARISON: CT chest 12/15/2021    CONTRAST/COMPLICATIONS:  IV Contrast: NONE  Oral Contrast: NONE  Complications: None reported at time of study completion    PROCEDURE:  CT of the Chest, Abdomen and Pelvis was performed.  Sagittal and coronal reformats were performed.    FINDINGS:  CHEST:  LUNGS AND LARGE AIRWAYS: Patent central airways. A 4 mm left upper lobe   nodule, new since prior study (2:12). Tree-in-bud nodules in bilateral   lower lobes. Patchy groundglass opacities in the bilateral upper lobes.  PLEURA: No pleural effusion.  VESSELS: Atherosclerotic changes of the aorta and coronary arteries.  HEART: Heart size is normal. No pericardial effusion.  MEDIASTINUM AND CRISTHIAN: No lymphadenopathy. Moderate hiatal hernia  CHEST WALL AND LOWER NECK: Heterogeneous thyroid gland..    ABDOMEN AND PELVIS: Motion degradation limits evaluation of the bowel.  LIVER: Within normal limits.  BILE DUCTS: Normal caliber.  GALLBLADDER: Vicarious excretion of contrast.  SPLEEN: Within normal limits.  PANCREAS: Within normal limits.  ADRENALS:Within normal limits.  KIDNEYS/URETERS: Persistent nephrograms. Exophytic hypodense right   interpolar lesion measures 1.7 cm, previously noted to be hyperdense.    BLADDER: Within normal limits.  REPRODUCTIVE ORGANS: A 2.2 cm right adnexal cyst. 1.4cm left adnexal   cyst. Calcified fibroid.    BOWEL: No bowel obstruction. Sigmoid diverticulosis. Normal appendix. The   bowel is poorly evaluated due to motion artifact.  PERITONEUM: No ascites.  VESSELS: Within normal limits.  RETROPERITONEUM/LYMPH NODES: Moderate presacral edema. No lymphadenopathy.  ABDOMINAL WALL: Within normal limits.  BONES: Degenerative changes.    IMPRESSION:  Bilateral lower lobe tree-in-bud nodules and patchy groundglass opacities   may represent atypical infection.    New 4 mm left upper lobe pulmonary nodule. Recommend attention on   follow-up.    Motion limited images of the abdomen. Grossly no bowel obstruction.    Persistent bilateral nephrograms. Patient received IV contrast 12/24/2022   for CTA Head and Neck. Recommend monitoring patient's serum creatinine   level to monitor for contrast-induced nephropathy.         CC: CVA, MI, Atrial fib.,     HPI: 89 yo female with Hx. of CAD, COPD, CVA R inferior cerebellar infarct, HLD, HTN, cervical spondylosis, Hospitalized in  on 12/22 for weakness sec. to COVID 19 infection , was discharged to [facility] for CED on 12/22/22, was sent to the ER this am for slurred speech. The patient son states he did a face time video with her around 11 am and found her to have slurred speech and hoarse voice.  In the ER she was found with new onset Atrial fib with RVR , Left sided weakness,  was treated with IV Cardizem by ER MD. Low Bp treated with IV NS.  Troponin 4870,     INTERVAL HPI/ OVERNIGHT EVENTS: Pt was seen and examined,  awake alert, OOB to chair, reports feeling tired now and has gluteal pain. No active bleeding noted.  Converted to SR.         Vital Signs Last 24 Hrs  T(C): 36.9 (28 Dec 2022 20:40), Max: 36.9 (28 Dec 2022 20:40)  T(F): 98.4 (28 Dec 2022 20:40), Max: 98.4 (28 Dec 2022 20:40)  HR: 72 (29 Dec 2022 09:00) (71 - 101)  BP: 140/65 (29 Dec 2022 08:15) (118/70 - 140/65)  RR: 22 (29 Dec 2022 08:15) (19 - 24)  SpO2: 95% (29 Dec 2022 09:00) (95% - 100%)    Parameters below as of 29 Dec 2022 09:00  Patient On (Oxygen Delivery Method): nasal cannula        REVIEW OF SYSTEMS:  All other review of systems is negative unless indicated above.      PHYSICAL EXAM:  General: Well developed; in no acute distress, on NC   Eyes:  EOMI; conjunctiva and sclera clear  Head: Normocephalic; atraumatic  ENMT: No nasal discharge; airway clear  Neck: Supple; non tender; no masses  Respiratory: Diminished BS b/l,  no wheezes  Cardiovascular: regular rate and rhythm. S1 and S2 Normal; + Murmur   Gastrointestinal: Soft non-tender non-distended; Normal bowel sounds  Genitourinary:  +  suprapubic  fullness   Extremities: + edema  Vascular: Peripheral pulses palpable 2+ bilaterally  Neurological: Alert and oriented x 2-3, non focal   Skin: Warm and dry. Multiple  ecchymotic changes UE b/l.   Musculoskeletal: Normal muscle tone, without deformities  Psychiatric: Cooperative and anxious     LABS:                         9.0    11.21 )-----------( 253      ( 29 Dec 2022 08:47 )             26.4     12-29    143  |  118<H>  |  104<H>  ----------------------------<  154<H>  5.6<H>   |  17<L>  |  4.03<H>    Ca    9.4      29 Dec 2022 08:47                            7.1    10.60 )-----------( 252      ( 28 Dec 2022 07:47 )             22.3     12-28    144  |  117<H>  |  102<H>  ----------------------------<  153<H>  5.4<H>   |  16<L>  |  4.01<H>    Ca    9.0      28 Dec 2022 07:47    TPro  5.5<L>  /  Alb  2.2<L>  /  TBili  0.4  /  DBili  x   /  AST  16  /  ALT  22  /  AlkPhos  60  12-27      LIVER FUNCTIONS - ( 27 Dec 2022 08:33 )  Alb: 2.2 g/dL / Pro: 5.5 gm/dL / ALK PHOS: 60 U/L / ALT: 22 U/L / AST: 16 U/L / GGT: x         PT/INR - ( 27 Dec 2022 08:33 )   PT: 14.1 sec;   INR: 1.21 ratio      PTT - ( 27 Dec 2022 17:24 )  PTT:30.6 sec                          7.9    x     )-----------( x        ( 27 Dec 2022 14:03 )             24.4     27 Dec 2022 08:33    141    |  115    |  85     ----------------------------<  171    5.4     |  17     |  3.49     Ca    8.9        27 Dec 2022 08:33    TPro  5.5    /  Alb  2.2    /  TBili  0.4    /  DBili  x      /  AST  16     /  ALT  22     /  AlkPhos  60     27 Dec 2022 08:33    PT/INR - ( 27 Dec 2022 08:33 )   PT: 14.1 sec;   INR: 1.21 ratio         PTT - ( 27 Dec 2022 08:33 )  PTT:128.6 sec  CAPILLARY BLOOD GLUCOSE        LIVER FUNCTIONS - ( 27 Dec 2022 08:33 )  Alb: 2.2 g/dL / Pro: 5.5 gm/dL / ALK PHOS: 60 U/L / ALT: 22 U/L / AST: 16 U/L / GGT: x               MEDICATIONS  (STANDING):  aspirin  chewable 81 milliGRAM(s) Oral daily  atorvastatin 40 milliGRAM(s) Oral at bedtime  budesonide  80 MICROgram(s)/formoterol 4.5 MICROgram(s) Inhaler 2 Puff(s) Inhalation two times a day  cefepime  Injectable. 1000 milliGRAM(s) IV Push every 24 hours  cholecalciferol 1000 Unit(s) Oral daily  dexAMETHasone  Injectable 6 milliGRAM(s) IV Push daily  diltiazem Infusion 5 mG/Hr (5 mL/Hr) IV Continuous <Continuous>  dorzolamide 2% Ophthalmic Solution 1 Drop(s) Both EYES three times a day  ferrous    sulfate 325 milliGRAM(s) Oral daily  heparin  Infusion.  Unit(s)/Hr (14 mL/Hr) IV Continuous <Continuous>  latanoprost 0.005% Ophthalmic Solution 1 Drop(s) Both EYES at bedtime  polyethylene glycol 3350 17 Gram(s) Oral daily  sodium chloride 0.9%. 1000 milliLiter(s) (75 mL/Hr) IV Continuous <Continuous>    MEDICATIONS  (PRN):  acetaminophen     Tablet .. 650 milliGRAM(s) Oral every 6 hours PRN Temp greater or equal to 38C (100.4F), Mild Pain (1 - 3)  digoxin  Injectable 125 MICROGram(s) IV Push once PRN HR> 130  heparin   Injectable 6500 Unit(s) IV Push every 6 hours PRN For aPTT less than 40  heparin   Injectable 3000 Unit(s) IV Push every 6 hours PRN For aPTT between 40 - 57      RADIOLOGY & ADDITIONAL TESTS:        ACC: [id number] EXAM:  US KIDNEYS AND BLADDER                        PROCEDURE DATE:  12/26/2022    FINDINGS:  Right kidney: 9.2 cm. Limited. No hydronephrosis.    Left kidney: 9.6 cm. Limited. No hydronephrosis.    Urinary bladder: Unremarkable.    IMPRESSION:    Limited study.    No hydronephrosis.            ACC: [id number] EXAM:  MR BRAIN                          *** ADDENDUM***    Please note area of abnormal T2 prolongation with restricted diffusion is   seen involving the high right posterior frontal cortical and subcortical   region not the left side as stated in the body of the report.    --- End of Report ---    *** END OF ADDENDUM***      PROCEDURE DATE:  12/25/2022          INTERPRETATION:  Clinical indication: Dysarthria. Left facial droop.    MRI of the brain was performed using sagittal T1 axial T1 T2 T2 FLAIR   diffusion and susceptibility weighted sequence    This exam is compared with prior brain MRI performed on January 14, 2015   and prior CT scan performed on December 24, 2022.    Extensive parenchymal volume loss and chronic microvascular ischemic   changes are identified.    There is evidence of a new vague area of T2 prolongation and restricted   diffusion seen involving the high left posterior frontal   cortical/subcortical region. This is best seen on series 5 image 51 and   does demonstrate decreased signal on the ADC mapping. This could be   compatible with a subacute infarct. No hemorrhagic transformation is   seen. No significant shift or herniation is seen.    Encephalomalacia and gliosis involving the inferiorright cerebellar   region is identified. This chronic appearing infarct has demonstrated   expected evolutionary changes when compared with the prior brain MRI.    There are no abnormal intra or extra-axial collections seen.    The large vessels demonstrate normal flow-voids.    Extensive mucosal thickening is seen involving both sphenoid sinus with   air-fluid levels involving the right sphenoid sinus. Bilateral ethmoid   sinus mucosal thickening is seen. The patient is status post bilateral   cataract surgery. Both mastoid and middle ear regions appear clear.    IMPRESSION: Subacute infarct suspected involving the high right posterior   frontal cortical/subcortical region.        < from: CT Chest No Cont (12.27.22 @ 16:41) >    ACC: [id number] EXAM:  CT ABDOMEN AND PELVIS                        ACC: [id number] EXAM:  CT CHEST                          PROCEDURE DATE:  12/27/2022          INTERPRETATION:  CLINICAL INFORMATION: Anemia    COMPARISON: CT chest 12/15/2021    CONTRAST/COMPLICATIONS:  IV Contrast: NONE  Oral Contrast: NONE  Complications: None reported at time of study completion    PROCEDURE:  CT of the Chest, Abdomen and Pelvis was performed.  Sagittal and coronal reformats were performed.    FINDINGS:  CHEST:  LUNGS AND LARGE AIRWAYS: Patent central airways. A 4 mm left upper lobe   nodule, new since prior study (2:12). Tree-in-bud nodules in bilateral   lower lobes. Patchy groundglass opacities in the bilateral upper lobes.  PLEURA: No pleural effusion.  VESSELS: Atherosclerotic changes of the aorta and coronary arteries.  HEART: Heart size is normal. No pericardial effusion.  MEDIASTINUM AND CRISTHIAN: No lymphadenopathy. Moderate hiatal hernia  CHEST WALL AND LOWER NECK: Heterogeneous thyroid gland..    ABDOMEN AND PELVIS: Motion degradation limits evaluation of the bowel.  LIVER: Within normal limits.  BILE DUCTS: Normal caliber.  GALLBLADDER: Vicarious excretion of contrast.  SPLEEN: Within normal limits.  PANCREAS: Within normal limits.  ADRENALS:Within normal limits.  KIDNEYS/URETERS: Persistent nephrograms. Exophytic hypodense right   interpolar lesion measures 1.7 cm, previously noted to be hyperdense.    BLADDER: Within normal limits.  REPRODUCTIVE ORGANS: A 2.2 cm right adnexal cyst. 1.4cm left adnexal   cyst. Calcified fibroid.    BOWEL: No bowel obstruction. Sigmoid diverticulosis. Normal appendix. The   bowel is poorly evaluated due to motion artifact.  PERITONEUM: No ascites.  VESSELS: Within normal limits.  RETROPERITONEUM/LYMPH NODES: Moderate presacral edema. No lymphadenopathy.  ABDOMINAL WALL: Within normal limits.  BONES: Degenerative changes.    IMPRESSION:  Bilateral lower lobe tree-in-bud nodules and patchy groundglass opacities   may represent atypical infection.    New 4 mm left upper lobe pulmonary nodule. Recommend attention on   follow-up.    Motion limited images of the abdomen. Grossly no bowel obstruction.    Persistent bilateral nephrograms. Patient received IV contrast 12/24/2022   for CTA Head and Neck. Recommend monitoring patient's serum creatinine   level to monitor for contrast-induced nephropathy.

## 2022-12-29 NOTE — PROGRESS NOTE ADULT - ASSESSMENT
89 CAD, COPD, CVA R inferior cerebellar infarct, HLD, HTN, cervical spondylosis, Hospitalized in  on 12/22 for weakness sec. to COVID 19 infection , was discharged to [facility] for CED on 12/22/22, was sent to the ER this am for slurred speech with renal evaluation of GIOVANNA.    GIOVANNA  likely secondary to contrast, hemodynamic as well    K treatment with lokelma  Would not be a good candidate for aggressive renal care with multi co-morbodities  No further contrast/NSAID's  Optimize intake    AFIB  -Maintain map to optimize renal perfusion    Hyperkalemia  -Monitor values  -limit k in diet  d/c with rn staff, dr Gupta

## 2022-12-29 NOTE — PROGRESS NOTE ADULT - SUBJECTIVE AND OBJECTIVE BOX
Patient is a 89y Female who reports no complaints as new per staff, sp prbc/lasix    MEDICATIONS  (STANDING):  aspirin  chewable 81 milliGRAM(s) Oral daily  atorvastatin 40 milliGRAM(s) Oral at bedtime  budesonide  80 MICROgram(s)/formoterol 4.5 MICROgram(s) Inhaler 2 Puff(s) Inhalation two times a day  cefepime  Injectable. 1000 milliGRAM(s) IV Push every 24 hours  cholecalciferol 1000 Unit(s) Oral daily  diltiazem Infusion 5 mG/Hr (5 mL/Hr) IV Continuous <Continuous>  dorzolamide 2% Ophthalmic Solution 1 Drop(s) Both EYES three times a day  ferrous    sulfate 325 milliGRAM(s) Oral daily  latanoprost 0.005% Ophthalmic Solution 1 Drop(s) Both EYES at bedtime  metoprolol tartrate 25 milliGRAM(s) Oral three times a day  polyethylene glycol 3350 17 Gram(s) Oral daily    MEDICATIONS  (PRN):  acetaminophen     Tablet .. 650 milliGRAM(s) Oral every 6 hours PRN Temp greater or equal to 38C (100.4F), Mild Pain (1 - 3)  ALPRAZolam 0.25 milliGRAM(s) Oral two times a day PRN anxiety  digoxin  Injectable 125 MICROGram(s) IV Push once PRN HR> 130        T(C): , Max: 36.9 (12-28-22 @ 20:40)  T(F): , Max: 98.4 (12-28-22 @ 20:40)  HR: 72 (12-29-22 @ 09:00)  BP: 140/65 (12-29-22 @ 08:15)  BP(mean): --  RR: 22 (12-29-22 @ 08:15)  SpO2: 95% (12-29-22 @ 09:00)  Wt(kg): --    PHYSICAL EXAM:    Constitutional: frail  HEENT: MM  dist, ronchi  Cardiovascular: S1 and S2   Extremities:  peripheral edema  Neurological: Alert          LABS:                        9.0    11.21 )-----------( 253      ( 29 Dec 2022 08:47 )             26.4     29 Dec 2022 08:47    143    |  118    |  104    ----------------------------<  154    5.6     |  17     |  4.03   28 Dec 2022 07:47    144    |  117    |  102    ----------------------------<  153    5.4     |  16     |  4.01   27 Dec 2022 08:33    141    |  115    |  85     ----------------------------<  171    5.4     |  17     |  3.49   26 Dec 2022 08:33    140    |  113    |  71     ----------------------------<  157    5.4     |  17     |  2.55     Ca    9.4        29 Dec 2022 08:47  Ca    9.0        28 Dec 2022 07:47  Ca    8.9        27 Dec 2022 08:33  Ca    9.0        26 Dec 2022 08:33    TPro  5.5    /  Alb  2.2    /  TBili  0.4    /  DBili  x      /  AST  16     /  ALT  22     /  AlkPhos  60     27 Dec 2022 08:33  TPro  6.1    /  Alb  2.1    /  TBili  0.4    /  DBili  x      /  AST  19     /  ALT  21     /  AlkPhos  74     26 Dec 2022 08:33          Urine Studies:          RADIOLOGY & ADDITIONAL STUDIES:              e

## 2022-12-29 NOTE — CONSULT NOTE ADULT - SUBJECTIVE AND OBJECTIVE BOX
HPI:     Pt is a 89y old Female with hx of presented from rehab w/ slurred speech that she had woken up w/ and was outside window of TPA.  Since she was admitted found to be covid + and now acute on chronic anemia possibly d/t acute blood loss.  She is also w/ GIOVANNA .     Pt is very tearful and A+ox 3 but was very nervous and scared and stated she wasnt really sure whats going on but she's very affraid of dying and asked me "is this the end??"     We discussed her blood was lower and she was receiving  a blood transfusion at this time.     emotional support provided and she stated team could talk to her children regarding her medical care        12/29  pt seen and examined this morning  did remember me from last night, is struggling as she does feel lonely and i offered some more emotional support at bedside.   She denied SOB, pain, nausea vomiting.  Was asking to be repositioned and staff and i repositioned her in bed.     She did receive prbc overnight w/o any issues  I did reach out to patients son Juan who expressed he does have HCP paperwork and will bring that in today.  He is care provider for 13 years.         PAIN: ( )Yes   ( x)No  Level:\   DYSPNEA: ( ) Yes  (x ) No  Level:        Review of System     Anxiety- tearful   Depression- denied  Physical Discomfort- denied  Dyspnea- denied  Constipation- denied  Diarrhea- denied  Nausea- denied  Vomiting- denied  Anorexia- ++  Weight Loss- denied   Cough-  ++  Secretions- none  Fatigue- ++   Weakness- +  Delirium- -    PHYSICAL EXAM:    Vital Signs Last 24 Hrs  T(C): 36.9 (28 Dec 2022 20:40), Max: 36.9 (28 Dec 2022 20:40)  T(F): 98.4 (28 Dec 2022 20:40), Max: 98.4 (28 Dec 2022 20:40)  HR: 72 (29 Dec 2022 09:00) (71 - 101)  BP: 140/65 (29 Dec 2022 08:15) (118/70 - 140/65)  BP(mean): --  RR: 22 (29 Dec 2022 08:15) (19 - 24)  SpO2: 95% (29 Dec 2022 09:00) (95% - 100%)    Parameters below as of 29 Dec 2022 09:00  Patient On (Oxygen Delivery Method): nasal cannula      Daily     Daily     PPSV2: 40  %  FAST:    General: calm in NAD  Mental Status: awake alert oriented x3  HEENT: eomi, perrl  Lungs: ctabl b/l bs  Cardiac: s1s2 no mgr  GI: soft nontender +BS  : voids  Ext: moves all 4 extremities spontaneously  Neuro: no gross findings     LABS:                        9.0    11.21 )-----------( 253      ( 29 Dec 2022 08:47 )             26.4     12-29    143  |  118<H>  |  104<H>  ----------------------------<  154<H>  5.6<H>   |  17<L>  |  4.03<H>    Ca    9.4      29 Dec 2022 08:47      PTT - ( 27 Dec 2022 17:24 )  PTT:30.6 sec  Albumin: Albumin, Serum: 2.2 g/dL (12-27 @ 08:33)      Allergies    No Known Allergies    Intolerances      MEDICATIONS  (STANDING):  aspirin  chewable 81 milliGRAM(s) Oral daily  atorvastatin 40 milliGRAM(s) Oral at bedtime  budesonide  80 MICROgram(s)/formoterol 4.5 MICROgram(s) Inhaler 2 Puff(s) Inhalation two times a day  cefepime  Injectable. 1000 milliGRAM(s) IV Push every 24 hours  cholecalciferol 1000 Unit(s) Oral daily  diltiazem Infusion 5 mG/Hr (5 mL/Hr) IV Continuous <Continuous>  dorzolamide 2% Ophthalmic Solution 1 Drop(s) Both EYES three times a day  ferrous    sulfate 325 milliGRAM(s) Oral daily  latanoprost 0.005% Ophthalmic Solution 1 Drop(s) Both EYES at bedtime  metoprolol tartrate 25 milliGRAM(s) Oral three times a day  polyethylene glycol 3350 17 Gram(s) Oral daily  sodium zirconium cyclosilicate 10 Gram(s) Oral once    MEDICATIONS  (PRN):  acetaminophen     Tablet .. 650 milliGRAM(s) Oral every 6 hours PRN Temp greater or equal to 38C (100.4F), Mild Pain (1 - 3)  ALPRAZolam 0.25 milliGRAM(s) Oral two times a day PRN anxiety  digoxin  Injectable 125 MICROGram(s) IV Push once PRN HR> 130      RADIOLOGY:

## 2022-12-29 NOTE — ADVANCED PRACTICE NURSE CONSULT - RECOMMEDATIONS
·Cavilon Advanced (liquid skin barrier) to coccyx - daily.    ·Recommendation: ·Turn and Reposition Q2H  ·Offload sacral-coccygeal area with positioner pillow, alternate sides Q2H,   ·Incontinence care with repositioning Q2H  -FOR incontinence Gluteal sacrum and groin  - Cleanse with NS, pat dry, paint with cavilon, apply TRIAD (Zinc-oxide barrier paste) BID and PRN for soiling: It is alright to leave a thin layer of cream on the skin   -Continue turning/positioning patient from side-to-side q2h while in bed, q1h when/if OOB chair, or in accordance w/ pt's plan of care. Utilize pillows and/or z-minerva fluidized positioner to assist w/ turning/positioning. When/if OOB chair, utilize pillows or air waffle chair cushion to offload pressure. If patietn a fall risk ensure chair alarm is on and working. Apply Antiskid mat under chair alarm to prevent patient from sliding off chair.   -Continue to offload heels from bed surface with soft pillow under calfs or by applying offloading boots to BLEs. May use foam boots or Total CAIR boot (while in bed please remove the plastic support/when out of bed in chair place the plastic suppot back in)   -Continue utilizing one underpad underneath patient to contain incontinence episodes; change pad when saturated/soiled. For profuse incontinence use 1 Purple pad under patient accept and wick away moisture.   -Continue nutrition consult for optimal wound healing & nutritional status.   -Assess skin/wound qshift, report changes to primary provider.

## 2022-12-29 NOTE — PROGRESS NOTE ADULT - NS ATTEND AMEND GEN_ALL_CORE FT
Plan of care reviewed with NP. Agree with plan of care.
Stable vital signs;  Telemetry suggestive of SR with 1st degree AV block -- will order 12 lead ECG; continue present management.  I discussed case with Dr Gupta.

## 2022-12-29 NOTE — PROGRESS NOTE ADULT - SUBJECTIVE AND OBJECTIVE BOX
HPI:  89 yo woman Hx. of CAD, COPD, CVA R inferior cerebellar infarct, HLD, HTN, cervical spondylosis, Hospitalized in  on 12/22 for weakness in the setting of  COVID 19 infection. D/C to NH, returned due to  slurred speech, new a-fib.    MEDICATIONS  (STANDING):  aspirin  chewable 81 milliGRAM(s) Oral daily  atorvastatin 40 milliGRAM(s) Oral at bedtime  budesonide  80 MICROgram(s)/formoterol 4.5 MICROgram(s) Inhaler 2 Puff(s) Inhalation two times a day  cefepime  Injectable. 1000 milliGRAM(s) IV Push every 24 hours  cholecalciferol 1000 Unit(s) Oral daily  diltiazem Infusion 5 mG/Hr (5 mL/Hr) IV Continuous <Continuous>  dorzolamide 2% Ophthalmic Solution 1 Drop(s) Both EYES three times a day  latanoprost 0.005% Ophthalmic Solution 1 Drop(s) Both EYES at bedtime  metoprolol tartrate 25 milliGRAM(s) Oral three times a day  polyethylene glycol 3350 17 Gram(s) Oral daily    MEDICATIONS  (PRN):  acetaminophen     Tablet .. 650 milliGRAM(s) Oral every 6 hours PRN Temp greater or equal to 38C (100.4F), Mild Pain (1 - 3)  ALPRAZolam 0.25 milliGRAM(s) Oral two times a day PRN anxiety  digoxin  Injectable 125 MICROGram(s) IV Push once PRN HR> 130      Vital Signs Last 24 Hrs  T(C): 36.8 (29 Dec 2022 15:48), Max: 36.9 (28 Dec 2022 20:40)  T(F): 98.3 (29 Dec 2022 15:48), Max: 98.4 (28 Dec 2022 20:40)  HR: 75 (29 Dec 2022 15:48) (71 - 101)  BP: 108/81 (29 Dec 2022 15:48) (108/81 - 140/65)  BP(mean): --  RR: 19 (29 Dec 2022 15:48) (19 - 24)  SpO2: 94% (29 Dec 2022 15:48) (94% - 99%)    Parameters below as of 29 Dec 2022 15:48  Patient On (Oxygen Delivery Method): nasal cannula  O2 Flow (L/min): 2    Neurological Exam:    HF: Patient is arousable, answers some questions, follows some commands  CN: Pupils are equal and reactive. Extra ocular muscles are intact. There is left facial droop. Sensation is intact in the face. Other CN II-XII are intact.   Motor: motor examination all muscles are ~ 3/5.   Sensory: intact to touch.   DTR: 0-1/4 all 4 extremities. Babinski is negative bilateral.  Co-ord:  Limited   Gait/balance: Not tested    Basic Metabolic Panel in AM (12.29.22 @ 08:47)   Sodium, Serum: 143 mmol/L   Potassium, Serum: 5.6 mmol/L   Chloride, Serum: 118 mmol/L   Carbon Dioxide, Serum: 17 mmol/L   Anion Gap, Serum: 8 mmol/L   Blood Urea Nitrogen, Serum: 104 mg/dL   Creatinine, Serum: 4.03 mg/dL   Glucose, Serum: 154 mg/dL   Calcium, Total Serum: 9.4 mg/dL   eGFR: 10: Complete Blood Count in AM (12.29.22 @ 08:47)   WBC Count: 11.21 K/uL   RBC Count: 2.93 M/uL   Hemoglobin: 9.0 g/dL   Hematocrit: 26.4 %   Mean Cell Volume: 90.1 fl   Mean Cell Hemoglobin: 30.7 pg   Mean Cell Hemoglobin Conc: 34.1 gm/dL   Red Cell Distrib Width: 14.5 %   Platelet Count - Automated: 253 K/uL < from: MR Head No Cont (12.25.22 @ 10:34) >  ACC: [id number] EXAM:  MR BRAIN                          *** ADDENDUM***    Please note area of abnormal T2 prolongation with restricted diffusion is   seen involving the high right posterior frontal cortical and subcortical   region not the left side as stated in the body of the report.    --- End of Report ---    *** END OF ADDENDUM***      PROCEDURE DATE:  12/25/2022          INTERPRETATION:  Clinical indication: Dysarthria. Left facial droop.    MRI of the brain was performed using sagittal T1 axial T1 T2 T2 FLAIR   diffusion and susceptibility weighted sequence    This exam is compared with prior brain MRI performed on January 14, 2015   and prior CT scan performed on December 24, 2022.    Extensive parenchymal volume loss and chronic microvascular ischemic   changes are identified.    There is evidence of a new vague area of T2 prolongation and restricted   diffusion seen involving the high left posterior frontal   cortical/subcortical region. This is best seen on series 5 image 51 and   does demonstrate decreased signal on the ADC mapping. This could be   compatible with a subacute infarct. No hemorrhagic transformation is   seen. No significant shift or herniation is seen.    Encephalomalacia and gliosis involving the inferiorright cerebellar   region is identified. This chronic appearing infarct has demonstrated   expected evolutionary changes when compared with the prior brain MRI.    There are no abnormal intra or extra-axial collections seen.    The large vessels demonstrate normal flow-voids.    Extensive mucosal thickening is seen involving both sphenoid sinus with   air-fluid levels involving the right sphenoid sinus. Bilateral ethmoid   sinus mucosal thickening is seen. The patient is status post bilateral   cataract surgery. Both mastoid and middle ear regions appear clear.    IMPRESSION: Subacute infarct suspected involving the high right posterior   frontal cortical/subcortical region.    --- End of Report ---    ***Please see the addendum at the top ofthis report. It may contain   additional important information or changes.****    < end of copied text >

## 2022-12-29 NOTE — CONSULT NOTE ADULT - CONSULT REASON
Pneumonia
complex goals of care and symptom management
GIOVANNA
possible stroke
Afib with rapid rate elevated troponin , TIA VS cva
complex goals of care and symptom management

## 2022-12-29 NOTE — CONSULT NOTE ADULT - REASON FOR ADMISSION
CVA, MI, Atrial fib.,

## 2022-12-29 NOTE — PROGRESS NOTE ADULT - SUBJECTIVE AND OBJECTIVE BOX
REASON FOR VISIT: AF    HPI:  89 year old woman with a history of CAD, COPD, CVA, HLD, HTN, severe mitral stenosis, recent COVID-19 infection admitted with subacute CVA and a new diagnosis of AF.    12/26/22.  No new complaints.  12/27/22 frail deconditioned appearing tele AF , pressure low, Drop in H&H noted   12/28/22 Frail ill appearing tele AF rates poorly controlled   12/29/22 Seated in bedside chair tele AF rates improved       MEDICATIONS  (STANDING):  aspirin  chewable 81 milliGRAM(s) Oral daily  atorvastatin 40 milliGRAM(s) Oral at bedtime  budesonide  80 MICROgram(s)/formoterol 4.5 MICROgram(s) Inhaler 2 Puff(s) Inhalation two times a day  cefepime  Injectable. 1000 milliGRAM(s) IV Push every 24 hours  cholecalciferol 1000 Unit(s) Oral daily  diltiazem Infusion 5 mG/Hr (5 mL/Hr) IV Continuous <Continuous>  dorzolamide 2% Ophthalmic Solution 1 Drop(s) Both EYES three times a day  ferrous    sulfate 325 milliGRAM(s) Oral daily  latanoprost 0.005% Ophthalmic Solution 1 Drop(s) Both EYES at bedtime  metoprolol tartrate 25 milliGRAM(s) Oral three times a day  polyethylene glycol 3350 17 Gram(s) Oral daily  sodium zirconium cyclosilicate 10 Gram(s) Oral once    MEDICATIONS  (PRN):  acetaminophen     Tablet .. 650 milliGRAM(s) Oral every 6 hours PRN Temp greater or equal to 38C (100.4F), Mild Pain (1 - 3)  ALPRAZolam 0.25 milliGRAM(s) Oral two times a day PRN anxiety  digoxin  Injectable 125 MICROGram(s) IV Push once PRN HR> 130          Vital Signs Last 24 Hrs  T(C): 36.9 (28 Dec 2022 20:40), Max: 36.9 (28 Dec 2022 20:40)  T(F): 98.4 (28 Dec 2022 20:40), Max: 98.4 (28 Dec 2022 20:40)  HR: 72 (29 Dec 2022 09:00) (71 - 101)  BP: 140/65 (29 Dec 2022 08:15) (118/70 - 140/65)  BP(mean): --  RR: 22 (29 Dec 2022 08:15) (19 - 24)  SpO2: 95% (29 Dec 2022 09:00) (95% - 100%)    Parameters below as of 29 Dec 2022 09:00  Patient On (Oxygen Delivery Method): nasal cannula            PHYSICAL EXAM:  Constitutional: Awake alert frail poor skin turgor   Respiratory: Non-labored Diminished at base, faint wheeze   Cardiovascular: irregular HR 70-80's  Gastrointestinal: soft nontender.   Extremities: + LE Edema  LABS:                    10.8   11.87 )-----------( 251      ( 26 Dec 2022 08:33 )             33.7     140  |  113<H>  |  71<H>  ----------------------------<  157<H>  5.4<H>   |  17<L>  |  2.55<H>    Ca    9.0      26 Dec 2022 08:33  Phos  4.0     12-24  Mg     2.3     12-24    TPro  6.1  /  Alb  2.1<L>  /  TBili  0.4  /  DBili  x   /  AST  19  /  ALT  21  /  AlkPhos  74  12-26    TroponinI hsT: 4851.10, 4870.41, 44.66         MR Head No Cont (12.25.22 @ 10:34): Subacute infarct suspected involving the high right posterior frontal cortical/subcortical region.    TTE Echo Complete w/o Contrast w/ Doppler (12.15.21 @ 14:49) >   Severe mitral annular calcification is present. Severe mitral stenosis is present. Mild (1+) mitral regurgitation is present.   Mild to moderate aortic stenosis is present.   The left atrium is moderately dilated.   Endocardium is not well visualized, however, overall left ventricular systolic function appears normal as seen in limited views.  Estimated left ventricular ejection fraction is 55 %.   Mild concentric left ventricular hypertrophy is present.     Summary     The left ventricle is normal in size, wall motion and contractility.   Mild concentric left ventricular hypertrophy is present.   Estimated left ventricular ejection fraction is 65-70 %.   The left atrium is mildly dilated.   Normal appearing right atrium.   Normal appearing right ventricle structure and function.   Severe aortic stenosis is present.   Moderate to severe mitral stenosis is present.   Moderate Mitral annular calcification is present.   Trace mitral regurgitation is present.   Normal appearing tricuspid valve structure.   Mild (1+) tricuspid valve regurgitation is present.   Normal appearing pulmonic valve structure and function.   Trace pulmonic valvular regurgitation is present.     Signature     ----------------------------------------------------------------   Electronically signed by Paula Hughes MD(Interpreting   physician) on 12/28/2022 12:05 PM      Tele: [facility] w/ JIN     REASON FOR VISIT: AF    HPI:  89 year old woman with a history of CAD, COPD, CVA, HLD, HTN, severe mitral stenosis, recent COVID-19 infection admitted with subacute CVA and a new diagnosis of AF.    12/26/22.  No new complaints.  12/27/22 frail deconditioned appearing tele AF , pressure low, Drop in H&H noted   12/28/22 Frail ill appearing tele AF rates poorly controlled   12/29/22 Seated in bedside chair tele AF rates improved     MEDICATIONS  (STANDING):  aspirin  chewable 81 milliGRAM(s) Oral daily  atorvastatin 40 milliGRAM(s) Oral at bedtime  budesonide  80 MICROgram(s)/formoterol 4.5 MICROgram(s) Inhaler 2 Puff(s) Inhalation two times a day  cefepime  Injectable. 1000 milliGRAM(s) IV Push every 24 hours  cholecalciferol 1000 Unit(s) Oral daily  diltiazem Infusion 5 mG/Hr (5 mL/Hr) IV Continuous <Continuous>  dorzolamide 2% Ophthalmic Solution 1 Drop(s) Both EYES three times a day  ferrous    sulfate 325 milliGRAM(s) Oral daily  latanoprost 0.005% Ophthalmic Solution 1 Drop(s) Both EYES at bedtime  metoprolol tartrate 25 milliGRAM(s) Oral three times a day  polyethylene glycol 3350 17 Gram(s) Oral daily  sodium zirconium cyclosilicate 10 Gram(s) Oral once    MEDICATIONS  (PRN):  acetaminophen     Tablet .. 650 milliGRAM(s) Oral every 6 hours PRN Temp greater or equal to 38C (100.4F), Mild Pain (1 - 3)  ALPRAZolam 0.25 milliGRAM(s) Oral two times a day PRN anxiety  digoxin  Injectable 125 MICROGram(s) IV Push once PRN HR> 130      Vital Signs Last 24 Hrs  T(C): 36.9 (28 Dec 2022 20:40), Max: 36.9 (28 Dec 2022 20:40)  T(F): 98.4 (28 Dec 2022 20:40), Max: 98.4 (28 Dec 2022 20:40)  HR: 72 (29 Dec 2022 09:00) (71 - 101)  BP: 140/65 (29 Dec 2022 08:15) (118/70 - 140/65)  BP(mean): --  RR: 22 (29 Dec 2022 08:15) (19 - 24)  SpO2: 95% (29 Dec 2022 09:00) (95% - 100%)    Parameters below as of 29 Dec 2022 09:00  Patient On (Oxygen Delivery Method): nasal cannula      PHYSICAL EXAM:  Constitutional: Awake alert frail poor skin turgor   Respiratory: Non-labored Diminished at base, faint wheeze   Cardiovascular: irregular HR 70-80's  Gastrointestinal: soft nontender.   Extremities: + LE Edema  LABS:                    10.8   11.87 )-----------( 251      ( 26 Dec 2022 08:33 )             33.7     140  |  113<H>  |  71<H>  ----------------------------<  157<H>  5.4<H>   |  17<L>  |  2.55<H>    Ca    9.0      26 Dec 2022 08:33  Phos  4.0     12-24  Mg     2.3     12-24    TPro  6.1  /  Alb  2.1<L>  /  TBili  0.4  /  DBili  x   /  AST  19  /  ALT  21  /  AlkPhos  74  12-26    TroponinI hsT: 4851.10, 4870.41, 44.66         MR Head No Cont (12.25.22 @ 10:34): Subacute infarct suspected involving the high right posterior frontal cortical/subcortical region.    TTE Echo Complete w/o Contrast w/ Doppler (12.15.21 @ 14:49) >   Severe mitral annular calcification is present. Severe mitral stenosis is present. Mild (1+) mitral regurgitation is present.   Mild to moderate aortic stenosis is present.   The left atrium is moderately dilated.   Endocardium is not well visualized, however, overall left ventricular systolic function appears normal as seen in limited views.  Estimated left ventricular ejection fraction is 55 %.   Mild concentric left ventricular hypertrophy is present.     Summary     The left ventricle is normal in size, wall motion and contractility.   Mild concentric left ventricular hypertrophy is present.   Estimated left ventricular ejection fraction is 65-70 %.   The left atrium is mildly dilated.   Normal appearing right atrium.   Normal appearing right ventricle structure and function.   Severe aortic stenosis is present.   Moderate to severe mitral stenosis is present.   Moderate Mitral annular calcification is present.   Trace mitral regurgitation is present.   Normal appearing tricuspid valve structure.   Mild (1+) tricuspid valve regurgitation is present.   Normal appearing pulmonic valve structure and function.   Trace pulmonic valvular regurgitation is present.     Signature     ----------------------------------------------------------------   Electronically signed by Paula Hughes MD(Interpreting   physician) on 12/28/2022 12:05 PM      Tele: [facility] w/ JIN     REASON FOR VISIT: AF    HPI:  89 year old woman with a history of CAD, COPD, CVA, HLD, HTN, severe mitral stenosis, recent COVID-19 infection admitted with subacute CVA and a new diagnosis of AF.    12/26/22.  No new complaints.  12/27/22 frail deconditioned appearing tele AF , pressure low, Drop in H&H noted   12/28/22 Frail ill appearing tele AF rates poorly controlled   12/29/22 Seated in bedside chair tele AF rates improved     MEDICATIONS  (STANDING):  aspirin  chewable 81 milliGRAM(s) Oral daily  atorvastatin 40 milliGRAM(s) Oral at bedtime  budesonide  80 MICROgram(s)/formoterol 4.5 MICROgram(s) Inhaler 2 Puff(s) Inhalation two times a day  cefepime  Injectable. 1000 milliGRAM(s) IV Push every 24 hours  cholecalciferol 1000 Unit(s) Oral daily  diltiazem Infusion 5 mG/Hr (5 mL/Hr) IV Continuous <Continuous>  dorzolamide 2% Ophthalmic Solution 1 Drop(s) Both EYES three times a day  ferrous    sulfate 325 milliGRAM(s) Oral daily  latanoprost 0.005% Ophthalmic Solution 1 Drop(s) Both EYES at bedtime  metoprolol tartrate 25 milliGRAM(s) Oral three times a day  polyethylene glycol 3350 17 Gram(s) Oral daily  sodium zirconium cyclosilicate 10 Gram(s) Oral once    MEDICATIONS  (PRN):  acetaminophen     Tablet .. 650 milliGRAM(s) Oral every 6 hours PRN Temp greater or equal to 38C (100.4F), Mild Pain (1 - 3)  ALPRAZolam 0.25 milliGRAM(s) Oral two times a day PRN anxiety  digoxin  Injectable 125 MICROGram(s) IV Push once PRN HR> 130      Vital Signs Last 24 Hrs  T(C): 36.9 (28 Dec 2022 20:40), Max: 36.9 (28 Dec 2022 20:40)  T(F): 98.4 (28 Dec 2022 20:40), Max: 98.4 (28 Dec 2022 20:40)  HR: 72 (29 Dec 2022 09:00) (71 - 101)  BP: 140/65 (29 Dec 2022 08:15) (118/70 - 140/65)  BP(mean): --  RR: 22 (29 Dec 2022 08:15) (19 - 24)  SpO2: 95% (29 Dec 2022 09:00) (95% - 100%)    Parameters below as of 29 Dec 2022 09:00  Patient On (Oxygen Delivery Method): nasal cannula      PHYSICAL EXAM:  Constitutional: Awake alert frail poor skin turgor   Respiratory: Non-labored Diminished at base, faint wheeze   Cardiovascular: irregular HR 70-80's  Gastrointestinal: soft nontender.   Extremities: + LE Edema  LABS:                    10.8   11.87 )-----------( 251      ( 26 Dec 2022 08:33 )             33.7     140  |  113<H>  |  71<H>  ----------------------------<  157<H>  5.4<H>   |  17<L>  |  2.55<H>    Ca    9.0      26 Dec 2022 08:33  Phos  4.0     12-24  Mg     2.3     12-24    TPro  6.1  /  Alb  2.1<L>  /  TBili  0.4  /  DBili  x   /  AST  19  /  ALT  21  /  AlkPhos  74  12-26    TroponinI hsT: 4851.10, 4870.41, 44.66         MR Head No Cont (12.25.22 @ 10:34): Subacute infarct suspected involving the high right posterior frontal cortical/subcortical region.    TTE Echo Complete w/o Contrast w/ Doppler (12.15.21 @ 14:49) >   Severe mitral annular calcification is present. Severe mitral stenosis is present. Mild (1+) mitral regurgitation is present.   Mild to moderate aortic stenosis is present.   The left atrium is moderately dilated.   Endocardium is not well visualized, however, overall left ventricular systolic function appears normal as seen in limited views.  Estimated left ventricular ejection fraction is 55 %.   Mild concentric left ventricular hypertrophy is present.     Summary     The left ventricle is normal in size, wall motion and contractility.   Mild concentric left ventricular hypertrophy is present.   Estimated left ventricular ejection fraction is 65-70 %.   The left atrium is mildly dilated.   Normal appearing right atrium.   Normal appearing right ventricle structure and function.   Severe aortic stenosis is present.   Moderate to severe mitral stenosis is present.   Moderate Mitral annular calcification is present.   Trace mitral regurgitation is present.   Normal appearing tricuspid valve structure.   Mild (1+) tricuspid valve regurgitation is present.   Normal appearing pulmonic valve structure and function.   Trace pulmonic valvular regurgitation is present.

## 2022-12-30 NOTE — PROGRESS NOTE ADULT - ASSESSMENT
89 yo female with Hx. of CAD, COPD, CVA R inferior cerebellar infarct, HLD, HTN, cervical spondylosis admitted for:       # New Onset Atrial fibrillation with RVR  - c/w tele: In SR now HR in 80s   - Titrate down off   cardizem  drip  - C/w PO metoprolol, BP better   - will retart on heparin  drip if repeat  H/H stable  -D/w Dr Gonzalez         # Subacute CVA  - not a candidate for TPA on admission   - MRI brain:  Subacute infarct suspected involving the high right posterior frontal cortical/subcortical region.  - C/w ASA  - C/w statin   - Speech pathology  - PT  - restart A/c if H/H stable      # Elevated troponin,  NSTEMI type II  in settings of acute infection and  AFIB   ECHO:  `+2-3 MS, EF 65-70%, No RWMA, + LVH, severe AS  C/w ASA, hold heparin drip for now   C/w statin  restart metoprolol   D/w Cardio , conservative management     # Acute Hypoxic respiratory failure due to  COVID 19 PNA  -  On 2L NC, monitor pulse ox   - Not candidate for Remdesivir due to GIOVANNA   - off  Decadron   - supportive care    # Acute renal failure due to contrast nephropathy   # Hyperkalemia  - monitor renal function, plateaued   -s/p IVF , 1U PRBcs   - pt retaining  Urine, will place rowley   - D/w DR Awan     # Acute On Chronic  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 of CAD, COPD, CVA R inferior cerebellar infarct, HLD, HTN, cervical spondylosis admitted for:       # New Onset Atrial fibrillation with RVR  - c/w tele:  In  AFIB, rate up tp 140s  - Restarted on Cardizem   drip  - refusing  PO metoprolol  - gentle hydration as pt has no Po Intake   - Started on Heparin drip this am, called by RN that noted to have hematuria,  will hold heparin   -D/w Dr Gonzalez         # Subacute CVA  - not a candidate for TPA on admission   - MRI brain: Subacute infarct suspected involving the high right posterior frontal cortical/subcortical region.  - C/w ASA  - C/w statin   - Speech pathology  - PT  - is not tolerating A/c     # Elevated troponin,  NSTEMI type II  in settings of acute infection and  AFIB   ECHO:  `+2-3 MS, EF 65-70%, No RWMA, + LVH, severe AS  C/w ASA,  statin  On metoprolol   D/w Cardio , conservative management       # Acute Hypoxic respiratory failure due to  COVID 19 PNA  -  On 2L NC, monitor pulse ox   - Not candidate for Remdesivir due to GIOVANNA   - off  Decadron   - supportive care    # Acute renal failure due to contrast nephropathy   # Hyperkalemia  - monitor renal function, Cr start trending down   -s/p IVF , 1U PRBcs   - C/w  rowley   - Strict is and os   - D/w DR Awan     # Acute On Chronic  blood loss 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   - repeat CXR ,   - On  Cefepime    IV  - D/w DR Rowe, will start IV antifungals  - magic mouth wash and nystatin  for  supportive care             # ACP:  DNR/DNI   MOLST form signed with Pts son who is HCP  Palliative eval to further discuss goals and D/c planning in progress

## 2022-12-30 NOTE — PROGRESS NOTE ADULT - SUBJECTIVE AND OBJECTIVE BOX
Date of service: 12-30-22 @ 15:17      Patient lying in bed; afebrile, has severe oral pain, mumbling, having increased oral secretions      ROS unable to obtain secondary to patient medical condition     MEDICATIONS  (STANDING):  aspirin  chewable 81 milliGRAM(s) Oral daily  atorvastatin 40 milliGRAM(s) Oral at bedtime  Biotene Dry Mouth Oral Rinse 15 milliLiter(s) Swish and Spit four times a day  budesonide  80 MICROgram(s)/formoterol 4.5 MICROgram(s) Inhaler 2 Puff(s) Inhalation two times a day  caspofungin IVPB      cefepime  Injectable. 1000 milliGRAM(s) IV Push every 24 hours  cholecalciferol 1000 Unit(s) Oral daily  dextrose 5% + sodium chloride 0.45%. 1000 milliLiter(s) (60 mL/Hr) IV Continuous <Continuous>  diltiazem Infusion 7.5 mG/Hr (7.5 mL/Hr) IV Continuous <Continuous>  dorzolamide 2% Ophthalmic Solution 1 Drop(s) Both EYES three times a day  FIRST- Mouthwash  BLM 15 milliLiter(s) Swish and Spit four times a day  heparin  Infusion.  Unit(s)/Hr (14 mL/Hr) IV Continuous <Continuous>  latanoprost 0.005% Ophthalmic Solution 1 Drop(s) Both EYES at bedtime  metoprolol tartrate 25 milliGRAM(s) Oral three times a day  nystatin    Suspension 951056 Unit(s) Oral four times a day  polyethylene glycol 3350 17 Gram(s) Oral daily    MEDICATIONS  (PRN):  acetaminophen     Tablet .. 650 milliGRAM(s) Oral every 6 hours PRN Temp greater or equal to 38C (100.4F), Mild Pain (1 - 3)  ALPRAZolam 0.25 milliGRAM(s) Oral two times a day PRN anxiety  heparin   Injectable 3000 Unit(s) IV Push every 6 hours PRN For aPTT between 40 - 57  heparin   Injectable 6500 Unit(s) IV Push every 6 hours PRN For aPTT less than 40      Vital Signs Last 24 Hrs  T(C): 36.7 (30 Dec 2022 08:39), Max: 36.8 (29 Dec 2022 15:48)  T(F): 98.1 (30 Dec 2022 08:39), Max: 98.3 (29 Dec 2022 15:48)  HR: 96 (30 Dec 2022 12:12) (75 - 144)  BP: 111/73 (30 Dec 2022 12:00) (93/62 - 135/79)  BP(mean): 72 (30 Dec 2022 06:59) (72 - 106)  RR: 21 (30 Dec 2022 12:00) (16 - 27)  SpO2: 98% (30 Dec 2022 12:00) (94% - 100%)    Parameters below as of 30 Dec 2022 12:00  Patient On (Oxygen Delivery Method): room air            Physical Exam:          Physical Exam:            Constitutional: frail looking  HEENT: NC/AT, EOMI, PERRLA, conjunctivae clear; ears and nose atraumatic; tongue black and swollen  Neck: supple; thyroid not palpable  Back: no tenderness  Respiratory: respiratory effort normal; clear to auscultation  Cardiovascular: S1S2 regular, no murmurs  Abdomen: soft, not tender, not distended, positive BS; no liver or spleen organomegaly  Genitourinary: no suprapubic tenderness  Musculoskeletal: no muscle tenderness, no joint swelling or tenderness  Neurological/ Psychiatric:   moving all extremities  Skin: multiple various skin lesions including on anterior chest, right lower extremity    Labs: all available labs reviewed                          Labs:                         Labs:                        9.3    16.11 )-----------( 295      ( 30 Dec 2022 08:47 )             28.7     12-30    146<H>  |  122<H>  |  109<H>  ----------------------------<  135<H>  5.6<H>   |  15<L>  |  3.78<H>    Ca    9.5      30 Dec 2022 08:47  Phos  5.2     12-30    TPro  x   /  Alb  2.8<L>  /  TBili  x   /  DBili  x   /  AST  x   /  ALT  x   /  AlkPhos  x   12-30           Cultures:       Culture - Urine (collected 12-25-22 @ 06:05)  Source: Catheterized Catheterized  Final Report (12-26-22 @ 06:29):    <10,000 CFU/mL Normal Urogenital Katie      D-Dimer Assay, Quantitative: 279 ng/mL DDU (12-22-22 @ 09:14)          Flu With COVID-19 By DOROTHEA (12.21.22 @ 16:14)    SARS-CoV-2 Result: Detected: This Respiratory Panel uses polymerase chain reaction (PCR) to detect for  influenza A; influenza B; respiratory syncytial virus; and SARS-CoV-2.  This test was validated by [facility] and is in use under the FDA  Emergency Use Authorization (EUA) for clinical labs CLIA-certified to  perform high complexity testing. Test results should be correlated with  clinical presentation, patient history, and epidemiology.    Influenza A Result: NotDetec    Influenza B Result: NotDetec    Resp Syn Virus Result: NotDetec          < from: Xray Chest 1 View- PORTABLE-Urgent (12.24.22 @ 14:26) >  ACC: [id number] EXAM:  XR CHEST PORTABLE URGENT 1V                          PROCEDURE DATE:  12/24/2022          INTERPRETATION:  Portable chest radiograph    CLINICAL INFORMATION: Altered mental status    TECHNIQUE:  Portable  AP chest radiograph.    COMPARISON: 12/21/2022 chest x-ray .    FINDINGS:  CATHETERS AND TUBES: None    PULMONARY: LEFT medial basilar on airspace consolidation superimposed on   a calcified mitral annulus.  LEFT upper zones and RIGHT lung parenchyma clear. No pneumothorax.    HEART/VASCULAR: The  heart is enlarged in transverse diameter.    BONES: Visualized osseous structures are intact.    IMPRESSION:   Cardiomegaly.  LEFT medial basilar airspace disease superimposed on calcified mitral   annulus.    < end of copied text >            Radiology: all available radiological tests reviewed    Advanced directives addressed: full resuscitation

## 2022-12-30 NOTE — PROGRESS NOTE ADULT - SUBJECTIVE AND OBJECTIVE BOX
HPI:      12/30  pt in NAD comfortable   decreased apetite and with increased oral discomfort   this was also discussed w/ pts son who called office earlier today   case d/w primary attending as well   Oral care discussed w/ staff, nystatin and biotene may help w/   son requests dentures removed and cleaned to help encourage more comfort and hoepfully better PO intake   repeat s/s eval as per family request to see if pt improved some or not- to help better asess anything extra that can be done to assist and increase her po intake safely       PAIN: ( )Yes   ( x)No     DYSPNEA: ( ) Yes  ( x) No  Level:        Review of Systems:  Review of Systems:    Anxiety- denies  Depression-denies  Physical Discomfort- in NAD  Dyspnea-denies  Constipation-denies  Diarrhea-denies  Nausea-denies  Vomiting-denies  Anorexia-++  Weight Loss- denies  Cough-denies  Secretions-denies  Fatigue-denies  Weakness-denies  Delirium-denies    All other systems reviewed and negative         PHYSICAL EXAM:    Vital Signs Last 24 Hrs  T(C): 36.6 (30 Dec 2022 15:30), Max: 36.7 (30 Dec 2022 08:39)  T(F): 97.9 (30 Dec 2022 15:30), Max: 98.1 (30 Dec 2022 08:39)  HR: 141 (30 Dec 2022 15:30) (81 - 144)  BP: 116/89 (30 Dec 2022 15:30) (93/62 - 135/79)  BP(mean): 72 (30 Dec 2022 06:59) (72 - 106)  RR: 22 (30 Dec 2022 15:30) (16 - 27)  SpO2: 100% (30 Dec 2022 15:30) (96% - 100%)    Parameters below as of 30 Dec 2022 15:30  Patient On (Oxygen Delivery Method): nasal cannula  O2 Flow (L/min): 2    Daily     Daily     PPSV2:  40  FAST:      General: calm in NAD  Mental Status: awake alert oriented x3  HEENT: eomi, perrl  Lungs: ctabl b/l bs  Cardiac: s1s2 no mgr  GI: soft nontender +BS  : voids  Ext: moves all 4 extremities spontaneously / echymosis still   Neuro: no gross findings           LABS:                        9.3    16.11 )-----------( 295      ( 30 Dec 2022 08:47 )             28.7     12-30    146<H>  |  122<H>  |  109<H>  ----------------------------<  135<H>  5.6<H>   |  15<L>  |  3.78<H>    Ca    9.5      30 Dec 2022 08:47  Phos  5.2     12-30    TPro  x   /  Alb  2.8<L>  /  TBili  x   /  DBili  x   /  AST  x   /  ALT  x   /  AlkPhos  x   12-30    PT/INR - ( 30 Dec 2022 08:47 )   PT: 13.4 sec;   INR: 1.15 ratio         PTT - ( 30 Dec 2022 08:47 )  PTT:23.4 sec  Albumin: Albumin, Serum: 2.8 g/dL (12-30 @ 08:47)      Allergies    No Known Allergies    Intolerances      MEDICATIONS  (STANDING):  aspirin  chewable 81 milliGRAM(s) Oral daily  atorvastatin 40 milliGRAM(s) Oral at bedtime  Biotene Dry Mouth Oral Rinse 15 milliLiter(s) Swish and Spit four times a day  budesonide  80 MICROgram(s)/formoterol 4.5 MICROgram(s) Inhaler 2 Puff(s) Inhalation two times a day  caspofungin IVPB      caspofungin IVPB 70 milliGRAM(s) IV Intermittent once  cholecalciferol 1000 Unit(s) Oral daily  dextrose 5% + sodium chloride 0.45%. 1000 milliLiter(s) (60 mL/Hr) IV Continuous <Continuous>  diltiazem Infusion 7.5 mG/Hr (7.5 mL/Hr) IV Continuous <Continuous>  dorzolamide 2% Ophthalmic Solution 1 Drop(s) Both EYES three times a day  FIRST- Mouthwash  BLM 15 milliLiter(s) Swish and Spit four times a day  heparin  Infusion.  Unit(s)/Hr (14 mL/Hr) IV Continuous <Continuous>  latanoprost 0.005% Ophthalmic Solution 1 Drop(s) Both EYES at bedtime  metoprolol tartrate 25 milliGRAM(s) Oral three times a day  nystatin    Suspension 764489 Unit(s) Oral four times a day  polyethylene glycol 3350 17 Gram(s) Oral daily    MEDICATIONS  (PRN):  acetaminophen     Tablet .. 650 milliGRAM(s) Oral every 6 hours PRN Temp greater or equal to 38C (100.4F), Mild Pain (1 - 3)  ALPRAZolam 0.25 milliGRAM(s) Oral two times a day PRN anxiety  heparin   Injectable 3000 Unit(s) IV Push every 6 hours PRN For aPTT between 40 - 57  heparin   Injectable 6500 Unit(s) IV Push every 6 hours PRN For aPTT less than 40      RADIOLOGY:

## 2022-12-30 NOTE — PROGRESS NOTE ADULT - SUBJECTIVE AND OBJECTIVE BOX
CC: CVA, MI, Atrial fib.,     HPI: 89 yo female with Hx. of CAD, COPD, CVA R inferior cerebellar infarct, HLD, HTN, cervical spondylosis, Hospitalized in  on 12/22 for weakness sec. to COVID 19 infection , was discharged to [facility] for CED on 12/22/22, was sent to the ER this am for slurred speech. The patient son states he did a face time video with her around 11 am and found her to have slurred speech and hoarse voice.  In the ER she was found with new onset Atrial fib with RVR , Left sided weakness,  was treated with IV Cardizem by ER MD. Low Bp treated with IV NS.  Troponin 4870,     INTERVAL HPI/ OVERNIGHT EVENTS: Pt was seen and examined,  awake alert, OOB to chair, reports feeling tired now and has gluteal pain. No active bleeding noted.  Converted to SR.     Vital Signs Last 24 Hrs  T(C): 36.7 (30 Dec 2022 08:39), Max: 36.8 (29 Dec 2022 15:48)  T(F): 98.1 (30 Dec 2022 08:39), Max: 98.3 (29 Dec 2022 15:48)  HR: 120 (30 Dec 2022 08:39) (75 - 144)  BP: 104/79 (30 Dec 2022 08:39) (93/62 - 135/79)  BP(mean): 72 (30 Dec 2022 06:59) (72 - 106)  RR: 26 (30 Dec 2022 08:39) (16 - 27)  SpO2: 99% (30 Dec 2022 08:39) (94% - 100%)    Parameters below as of 30 Dec 2022 08:39  Patient On (Oxygen Delivery Method): nasal cannula  O2 Flow (L/min): 2          REVIEW OF SYSTEMS:  All other review of systems is negative unless indicated above.      PHYSICAL EXAM:  General: Well developed; in no acute distress, on NC   Eyes:  EOMI; conjunctiva and sclera clear  Head: Normocephalic; atraumatic  ENMT: No nasal discharge; airway clear  Neck: Supple; non tender; no masses  Respiratory: Diminished BS b/l,  no wheezes  Cardiovascular: regular rate and rhythm. S1 and S2 Normal; + Murmur   Gastrointestinal: Soft non-tender non-distended; Normal bowel sounds  Genitourinary:  +  suprapubic  fullness   Extremities: + edema  Vascular: Peripheral pulses palpable 2+ bilaterally  Neurological: Alert and oriented x 2-3, non focal   Skin: Warm and dry. Multiple  ecchymotic changes UE b/l.   Musculoskeletal: Normal muscle tone, without deformities  Psychiatric: Cooperative and anxious     LABS:                         9.3    16.11 )-----------( 295      ( 30 Dec 2022 08:47 )             28.7     12-30    146<H>  |  122<H>  |  109<H>  ----------------------------<  135<H>  5.6<H>   |  15<L>  |  3.78<H>    Ca    9.5      30 Dec 2022 08:47  Phos  5.2     12-30    TPro  x   /  Alb  2.8<L>  /  TBili  x   /  DBili  x   /  AST  x   /  ALT  x   /  AlkPhos  x   12-30  LIVER FUNCTIONS - ( 30 Dec 2022 08:47 )  Alb: 2.8 g/dL / Pro: x     / ALK PHOS: x     / ALT: x     / AST: x     / GGT: x           PT/INR - ( 30 Dec 2022 08:47 )   PT: 13.4 sec;   INR: 1.15 ratio    PTT - ( 30 Dec 2022 08:47 )  PTT:23.4 sec                              9.0    11.21 )-----------( 253      ( 29 Dec 2022 08:47 )             26.4     12-29    143  |  118<H>  |  104<H>  ----------------------------<  154<H>  5.6<H>   |  17<L>  |  4.03<H>    Ca    9.4      29 Dec 2022 08:47                            7.1    10.60 )-----------( 252      ( 28 Dec 2022 07:47 )             22.3     12-28    144  |  117<H>  |  102<H>  ----------------------------<  153<H>  5.4<H>   |  16<L>  |  4.01<H>    Ca    9.0      28 Dec 2022 07:47    TPro  5.5<L>  /  Alb  2.2<L>  /  TBili  0.4  /  DBili  x   /  AST  16  /  ALT  22  /  AlkPhos  60  12-27      LIVER FUNCTIONS - ( 27 Dec 2022 08:33 )  Alb: 2.2 g/dL / Pro: 5.5 gm/dL / ALK PHOS: 60 U/L / ALT: 22 U/L / AST: 16 U/L / GGT: x         PT/INR - ( 27 Dec 2022 08:33 )   PT: 14.1 sec;   INR: 1.21 ratio      PTT - ( 27 Dec 2022 17:24 )  PTT:30.6 sec                          7.9    x     )-----------( x        ( 27 Dec 2022 14:03 )             24.4     27 Dec 2022 08:33    141    |  115    |  85     ----------------------------<  171    5.4     |  17     |  3.49     Ca    8.9        27 Dec 2022 08:33    TPro  5.5    /  Alb  2.2    /  TBili  0.4    /  DBili  x      /  AST  16     /  ALT  22     /  AlkPhos  60     27 Dec 2022 08:33    PT/INR - ( 27 Dec 2022 08:33 )   PT: 14.1 sec;   INR: 1.21 ratio         PTT - ( 27 Dec 2022 08:33 )  PTT:128.6 sec  CAPILLARY BLOOD GLUCOSE        LIVER FUNCTIONS - ( 27 Dec 2022 08:33 )  Alb: 2.2 g/dL / Pro: 5.5 gm/dL / ALK PHOS: 60 U/L / ALT: 22 U/L / AST: 16 U/L / GGT: x               MEDICATIONS  (STANDING):  aspirin  chewable 81 milliGRAM(s) Oral daily  atorvastatin 40 milliGRAM(s) Oral at bedtime  Biotene Dry Mouth Oral Rinse 15 milliLiter(s) Swish and Spit four times a day  budesonide  80 MICROgram(s)/formoterol 4.5 MICROgram(s) Inhaler 2 Puff(s) Inhalation two times a day  cefepime  Injectable. 1000 milliGRAM(s) IV Push every 24 hours  cholecalciferol 1000 Unit(s) Oral daily  dextrose 5% + sodium chloride 0.45%. 1000 milliLiter(s) (60 mL/Hr) IV Continuous <Continuous>  diltiazem Infusion 5 mG/Hr (5 mL/Hr) IV Continuous <Continuous>  dorzolamide 2% Ophthalmic Solution 1 Drop(s) Both EYES three times a day  FIRST- Mouthwash  BLM 15 milliLiter(s) Swish and Spit four times a day  heparin  Infusion.  Unit(s)/Hr (14 mL/Hr) IV Continuous <Continuous>  latanoprost 0.005% Ophthalmic Solution 1 Drop(s) Both EYES at bedtime  metoprolol tartrate 25 milliGRAM(s) Oral three times a day  nystatin    Suspension 508043 Unit(s) Oral four times a day  polyethylene glycol 3350 17 Gram(s) Oral daily    MEDICATIONS  (PRN):  acetaminophen     Tablet .. 650 milliGRAM(s) Oral every 6 hours PRN Temp greater or equal to 38C (100.4F), Mild Pain (1 - 3)  ALPRAZolam 0.25 milliGRAM(s) Oral two times a day PRN anxiety  heparin   Injectable 6500 Unit(s) IV Push every 6 hours PRN For aPTT less than 40  heparin   Injectable 3000 Unit(s) IV Push every 6 hours PRN For aPTT between 40 - 57        RADIOLOGY & ADDITIONAL TESTS:        ACC: [id number] EXAM:  US KIDNEYS AND BLADDER                        PROCEDURE DATE:  12/26/2022    FINDINGS:  Right kidney: 9.2 cm. Limited. No hydronephrosis.    Left kidney: 9.6 cm. Limited. No hydronephrosis.    Urinary bladder: Unremarkable.    IMPRESSION:    Limited study.    No hydronephrosis.            ACC: [id number] EXAM:  MR BRAIN                          *** ADDENDUM***    Please note area of abnormal T2 prolongation with restricted diffusion is   seen involving the high right posterior frontal cortical and subcortical   region not the left side as stated in the body of the report.    --- End of Report ---    *** END OF ADDENDUM***      PROCEDURE DATE:  12/25/2022          INTERPRETATION:  Clinical indication: Dysarthria. Left facial droop.    MRI of the brain was performed using sagittal T1 axial T1 T2 T2 FLAIR   diffusion and susceptibility weighted sequence    This exam is compared with prior brain MRI performed on January 14, 2015   and prior CT scan performed on December 24, 2022.    Extensive parenchymal volume loss and chronic microvascular ischemic   changes are identified.    There is evidence of a new vague area of T2 prolongation and restricted   diffusion seen involving the high left posterior frontal   cortical/subcortical region. This is best seen on series 5 image 51 and   does demonstrate decreased signal on the ADC mapping. This could be   compatible with a subacute infarct. No hemorrhagic transformation is   seen. No significant shift or herniation is seen.    Encephalomalacia and gliosis involving the inferiorright cerebellar   region is identified. This chronic appearing infarct has demonstrated   expected evolutionary changes when compared with the prior brain MRI.    There are no abnormal intra or extra-axial collections seen.    The large vessels demonstrate normal flow-voids.    Extensive mucosal thickening is seen involving both sphenoid sinus with   air-fluid levels involving the right sphenoid sinus. Bilateral ethmoid   sinus mucosal thickening is seen. The patient is status post bilateral   cataract surgery. Both mastoid and middle ear regions appear clear.    IMPRESSION: Subacute infarct suspected involving the high right posterior   frontal cortical/subcortical region.        < from: CT Chest No Cont (12.27.22 @ 16:41) >    ACC: [id number] EXAM:  CT ABDOMEN AND PELVIS                        ACC: [id number] EXAM:  CT CHEST                          PROCEDURE DATE:  12/27/2022          INTERPRETATION:  CLINICAL INFORMATION: Anemia    COMPARISON: CT chest 12/15/2021    CONTRAST/COMPLICATIONS:  IV Contrast: NONE  Oral Contrast: NONE  Complications: None reported at time of study completion    PROCEDURE:  CT of the Chest, Abdomen and Pelvis was performed.  Sagittal and coronal reformats were performed.    FINDINGS:  CHEST:  LUNGS AND LARGE AIRWAYS: Patent central airways. A 4 mm left upper lobe   nodule, new since prior study (2:12). Tree-in-bud nodules in bilateral   lower lobes. Patchy groundglass opacities in the bilateral upper lobes.  PLEURA: No pleural effusion.  VESSELS: Atherosclerotic changes of the aorta and coronary arteries.  HEART: Heart size is normal. No pericardial effusion.  MEDIASTINUM AND CRISTHIAN: No lymphadenopathy. Moderate hiatal hernia  CHEST WALL AND LOWER NECK: Heterogeneous thyroid gland..    ABDOMEN AND PELVIS: Motion degradation limits evaluation of the bowel.  LIVER: Within normal limits.  BILE DUCTS: Normal caliber.  GALLBLADDER: Vicarious excretion of contrast.  SPLEEN: Within normal limits.  PANCREAS: Within normal limits.  ADRENALS:Within normal limits.  KIDNEYS/URETERS: Persistent nephrograms. Exophytic hypodense right   interpolar lesion measures 1.7 cm, previously noted to be hyperdense.    BLADDER: Within normal limits.  REPRODUCTIVE ORGANS: A 2.2 cm right adnexal cyst. 1.4cm left adnexal   cyst. Calcified fibroid.    BOWEL: No bowel obstruction. Sigmoid diverticulosis. Normal appendix. The   bowel is poorly evaluated due to motion artifact.  PERITONEUM: No ascites.  VESSELS: Within normal limits.  RETROPERITONEUM/LYMPH NODES: Moderate presacral edema. No lymphadenopathy.  ABDOMINAL WALL: Within normal limits.  BONES: Degenerative changes.    IMPRESSION:  Bilateral lower lobe tree-in-bud nodules and patchy groundglass opacities   may represent atypical infection.    New 4 mm left upper lobe pulmonary nodule. Recommend attention on   follow-up.    Motion limited images of the abdomen. Grossly no bowel obstruction.    Persistent bilateral nephrograms. Patient received IV contrast 12/24/2022   for CTA Head and Neck. Recommend monitoring patient's serum creatinine   level to monitor for contrast-induced nephropathy.         CC: CVA, MI, Atrial fib.,     HPI: 89 yo female with Hx. of CAD, COPD, CVA R inferior cerebellar infarct, HLD, HTN, cervical spondylosis, Hospitalized in  on 12/22 for weakness sec. to COVID 19 infection , was discharged to [facility] for CED on 12/22/22, was sent to the ER this am for slurred speech. The patient son states he did a face time video with her around 11 am and found her to have slurred speech and hoarse voice.  In the ER she was found with new onset Atrial fib with RVR , Left sided weakness,  was treated with IV Cardizem by ER MD. Low Bp treated with IV NS.  Troponin 4870,     INTERVAL HPI/ OVERNIGHT EVENTS: Pt was seen and examined,   OOB to chair, refusing PO , mumbling hard to understand, seems to be uncomfortable due to pain  with food  or liquids. Denies SOB        Vital Signs Last 24 Hrs  T(C): 36.7 (30 Dec 2022 08:39), Max: 36.8 (29 Dec 2022 15:48)  T(F): 98.1 (30 Dec 2022 08:39), Max: 98.3 (29 Dec 2022 15:48)  HR: 120 (30 Dec 2022 08:39) (75 - 144)  BP: 104/79 (30 Dec 2022 08:39) (93/62 - 135/79)  BP(mean): 72 (30 Dec 2022 06:59) (72 - 106)  RR: 26 (30 Dec 2022 08:39) (16 - 27)  SpO2: 99% (30 Dec 2022 08:39) (94% - 100%)    Parameters below as of 30 Dec 2022 08:39  Patient On (Oxygen Delivery Method): nasal cannula  O2 Flow (L/min): 2          REVIEW OF SYSTEMS:  All other review of systems is negative unless indicated above.      PHYSICAL EXAM:  General: Well developed; uncomfortable, , on  RA   Eyes:  EOMI; conjunctiva and sclera clear  Head: Normocephalic; atraumatic  ENMT: No nasal discharge;  oral mucosa with secretion and dark coverage on the  tongue,  dry lips   Neck: Supple; non tender; no masses  Respiratory: Diminished BS b/l,  no wheezes  Cardiovascular: Irregular rate and rhythm. S1 and S2 Normal; + Murmur   Gastrointestinal: Soft non-tender non-distended; Normal bowel sounds  Genitourinary:  Sosa in place   Extremities: edema  improved   Vascular: Peripheral pulses palpable 2+ bilaterally  Neurological: Alert and oriented x 2-3, non focal   Skin: Warm and dry. Multiple  ecchymotic changes UE b/l.    Musculoskeletal: Normal muscle tone, without deformities  Psychiatric: Cooperative and anxious     LABS:                         9.3    16.11 )-----------( 295      ( 30 Dec 2022 08:47 )             28.7     12-30    146<H>  |  122<H>  |  109<H>  ----------------------------<  135<H>  5.6<H>   |  15<L>  |  3.78<H>    Ca    9.5      30 Dec 2022 08:47  Phos  5.2     12-30    TPro  x   /  Alb  2.8<L>  /  TBili  x   /  DBili  x   /  AST  x   /  ALT  x   /  AlkPhos  x   12-30  LIVER FUNCTIONS - ( 30 Dec 2022 08:47 )  Alb: 2.8 g/dL / Pro: x     / ALK PHOS: x     / ALT: x     / AST: x     / GGT: x           PT/INR - ( 30 Dec 2022 08:47 )   PT: 13.4 sec;   INR: 1.15 ratio    PTT - ( 30 Dec 2022 08:47 )  PTT:23.4 sec                              9.0    11.21 )-----------( 253      ( 29 Dec 2022 08:47 )             26.4     12-29    143  |  118<H>  |  104<H>  ----------------------------<  154<H>  5.6<H>   |  17<L>  |  4.03<H>    Ca    9.4      29 Dec 2022 08:47                            7.1    10.60 )-----------( 252      ( 28 Dec 2022 07:47 )             22.3     12-28    144  |  117<H>  |  102<H>  ----------------------------<  153<H>  5.4<H>   |  16<L>  |  4.01<H>    Ca    9.0      28 Dec 2022 07:47    TPro  5.5<L>  /  Alb  2.2<L>  /  TBili  0.4  /  DBili  x   /  AST  16  /  ALT  22  /  AlkPhos  60  12-27      LIVER FUNCTIONS - ( 27 Dec 2022 08:33 )  Alb: 2.2 g/dL / Pro: 5.5 gm/dL / ALK PHOS: 60 U/L / ALT: 22 U/L / AST: 16 U/L / GGT: x         PT/INR - ( 27 Dec 2022 08:33 )   PT: 14.1 sec;   INR: 1.21 ratio      PTT - ( 27 Dec 2022 17:24 )  PTT:30.6 sec                          7.9    x     )-----------( x        ( 27 Dec 2022 14:03 )             24.4     27 Dec 2022 08:33    141    |  115    |  85     ----------------------------<  171    5.4     |  17     |  3.49     Ca    8.9        27 Dec 2022 08:33    TPro  5.5    /  Alb  2.2    /  TBili  0.4    /  DBili  x      /  AST  16     /  ALT  22     /  AlkPhos  60     27 Dec 2022 08:33    PT/INR - ( 27 Dec 2022 08:33 )   PT: 14.1 sec;   INR: 1.21 ratio         PTT - ( 27 Dec 2022 08:33 )  PTT:128.6 sec  CAPILLARY BLOOD GLUCOSE        LIVER FUNCTIONS - ( 27 Dec 2022 08:33 )  Alb: 2.2 g/dL / Pro: 5.5 gm/dL / ALK PHOS: 60 U/L / ALT: 22 U/L / AST: 16 U/L / GGT: x               MEDICATIONS  (STANDING):  aspirin  chewable 81 milliGRAM(s) Oral daily  atorvastatin 40 milliGRAM(s) Oral at bedtime  Biotene Dry Mouth Oral Rinse 15 milliLiter(s) Swish and Spit four times a day  budesonide  80 MICROgram(s)/formoterol 4.5 MICROgram(s) Inhaler 2 Puff(s) Inhalation two times a day  cefepime  Injectable. 1000 milliGRAM(s) IV Push every 24 hours  cholecalciferol 1000 Unit(s) Oral daily  dextrose 5% + sodium chloride 0.45%. 1000 milliLiter(s) (60 mL/Hr) IV Continuous <Continuous>  diltiazem Infusion 5 mG/Hr (5 mL/Hr) IV Continuous <Continuous>  dorzolamide 2% Ophthalmic Solution 1 Drop(s) Both EYES three times a day  FIRST- Mouthwash  BLM 15 milliLiter(s) Swish and Spit four times a day  heparin  Infusion.  Unit(s)/Hr (14 mL/Hr) IV Continuous <Continuous>  latanoprost 0.005% Ophthalmic Solution 1 Drop(s) Both EYES at bedtime  metoprolol tartrate 25 milliGRAM(s) Oral three times a day  nystatin    Suspension 000307 Unit(s) Oral four times a day  polyethylene glycol 3350 17 Gram(s) Oral daily    MEDICATIONS  (PRN):  acetaminophen     Tablet .. 650 milliGRAM(s) Oral every 6 hours PRN Temp greater or equal to 38C (100.4F), Mild Pain (1 - 3)  ALPRAZolam 0.25 milliGRAM(s) Oral two times a day PRN anxiety  heparin   Injectable 6500 Unit(s) IV Push every 6 hours PRN For aPTT less than 40  heparin   Injectable 3000 Unit(s) IV Push every 6 hours PRN For aPTT between 40 - 57        RADIOLOGY & ADDITIONAL TESTS:        ACC: [id number] EXAM:  US KIDNEYS AND BLADDER                        PROCEDURE DATE:  12/26/2022    FINDINGS:  Right kidney: 9.2 cm. Limited. No hydronephrosis.    Left kidney: 9.6 cm. Limited. No hydronephrosis.    Urinary bladder: Unremarkable.    IMPRESSION:    Limited study.    No hydronephrosis.            ACC: [id number] EXAM:  MR BRAIN                          *** ADDENDUM***    Please note area of abnormal T2 prolongation with restricted diffusion is   seen involving the high right posterior frontal cortical and subcortical   region not the left side as stated in the body of the report.    --- End of Report ---    *** END OF ADDENDUM***      PROCEDURE DATE:  12/25/2022          INTERPRETATION:  Clinical indication: Dysarthria. Left facial droop.    MRI of the brain was performed using sagittal T1 axial T1 T2 T2 FLAIR   diffusion and susceptibility weighted sequence    This exam is compared with prior brain MRI performed on January 14, 2015   and prior CT scan performed on December 24, 2022.    Extensive parenchymal volume loss and chronic microvascular ischemic   changes are identified.    There is evidence of a new vague area of T2 prolongation and restricted   diffusion seen involving the high left posterior frontal   cortical/subcortical region. This is best seen on series 5 image 51 and   does demonstrate decreased signal on the ADC mapping. This could be   compatible with a subacute infarct. No hemorrhagic transformation is   seen. No significant shift or herniation is seen.    Encephalomalacia and gliosis involving the inferiorright cerebellar   region is identified. This chronic appearing infarct has demonstrated   expected evolutionary changes when compared with the prior brain MRI.    There are no abnormal intra or extra-axial collections seen.    The large vessels demonstrate normal flow-voids.    Extensive mucosal thickening is seen involving both sphenoid sinus with   air-fluid levels involving the right sphenoid sinus. Bilateral ethmoid   sinus mucosal thickening is seen. The patient is status post bilateral   cataract surgery. Both mastoid and middle ear regions appear clear.    IMPRESSION: Subacute infarct suspected involving the high right posterior   frontal cortical/subcortical region.        < from: CT Chest No Cont (12.27.22 @ 16:41) >    ACC: [id number] EXAM:  CT ABDOMEN AND PELVIS                        ACC: [id number] EXAM:  CT CHEST                          PROCEDURE DATE:  12/27/2022          INTERPRETATION:  CLINICAL INFORMATION: Anemia    COMPARISON: CT chest 12/15/2021    CONTRAST/COMPLICATIONS:  IV Contrast: NONE  Oral Contrast: NONE  Complications: None reported at time of study completion    PROCEDURE:  CT of the Chest, Abdomen and Pelvis was performed.  Sagittal and coronal reformats were performed.    FINDINGS:  CHEST:  LUNGS AND LARGE AIRWAYS: Patent central airways. A 4 mm left upper lobe   nodule, new since prior study (2:12). Tree-in-bud nodules in bilateral   lower lobes. Patchy groundglass opacities in the bilateral upper lobes.  PLEURA: No pleural effusion.  VESSELS: Atherosclerotic changes of the aorta and coronary arteries.  HEART: Heart size is normal. No pericardial effusion.  MEDIASTINUM AND CRISTHIAN: No lymphadenopathy. Moderate hiatal hernia  CHEST WALL AND LOWER NECK: Heterogeneous thyroid gland..    ABDOMEN AND PELVIS: Motion degradation limits evaluation of the bowel.  LIVER: Within normal limits.  BILE DUCTS: Normal caliber.  GALLBLADDER: Vicarious excretion of contrast.  SPLEEN: Within normal limits.  PANCREAS: Within normal limits.  ADRENALS:Within normal limits.  KIDNEYS/URETERS: Persistent nephrograms. Exophytic hypodense right   interpolar lesion measures 1.7 cm, previously noted to be hyperdense.    BLADDER: Within normal limits.  REPRODUCTIVE ORGANS: A 2.2 cm right adnexal cyst. 1.4cm left adnexal   cyst. Calcified fibroid.    BOWEL: No bowel obstruction. Sigmoid diverticulosis. Normal appendix. The   bowel is poorly evaluated due to motion artifact.  PERITONEUM: No ascites.  VESSELS: Within normal limits.  RETROPERITONEUM/LYMPH NODES: Moderate presacral edema. No lymphadenopathy.  ABDOMINAL WALL: Within normal limits.  BONES: Degenerative changes.    IMPRESSION:  Bilateral lower lobe tree-in-bud nodules and patchy groundglass opacities   may represent atypical infection.    New 4 mm left upper lobe pulmonary nodule. Recommend attention on   follow-up.    Motion limited images of the abdomen. Grossly no bowel obstruction.    Persistent bilateral nephrograms. Patient received IV contrast 12/24/2022   for CTA Head and Neck. Recommend monitoring patient's serum creatinine   level to monitor for contrast-induced nephropathy.

## 2022-12-30 NOTE — PROGRESS NOTE ADULT - ASSESSMENT
Process of Care  --Reviewed dx/treatment problems and alignment with Goals of Care    Physical Aspects of Care    --anorexia / dysphagia  Oral care discussed w/ staff,  nystatin and biotene may help w/   denture care  repeat s/s eval as per family request    --Pain  patient denies at this time  c/w current managment  can try magic mouth wash      --Bowel Regimen  risk for constipation d/t immobility  daily dulcolax    --Dyspnea  No SOB at this time  comfortable and in NAD  on NC this morning     --Nausea Vomiting  denies    --Weakness  PT as tolerated     Psychological and Psychiatric Aspects of Care:   --Greif/Bereavment: emotional support provided  --Hx of psychiatric dx: none  -Pastoral Care Available PRN     Social Aspects of Care  -SW involved     Cultural Aspects  -Primary Language: English    Goals of Care:      no changes at this time    Ethical and Legal Aspects:   as above        Capacity: pt w/ simple capacity  HCP/Surrogate: all children equally (no HCP on chart) Vincent Martinez will bring in HCP papers   Code Status: dnr dni  MOLST: on chart  Dispo Plan: pending further discussions    Discussed With: Case coordinated with attending and SW and RN     Time Spent: 45 minutes including the care, coordination and counseling of this patient, 50% of which was spent coordinating and counseling.

## 2022-12-30 NOTE — PROGRESS NOTE ADULT - PROBLEM SELECTOR PLAN 2
Persistent AF -- HR was much better yesterday but she is now tachycardic; Cardizem 10mg IVP x 1; continue metoprolol, anticoagulation, IVF.

## 2022-12-30 NOTE — PHARMACOTHERAPY INTERVENTION NOTE - INTERVENTION TYPE RECOOMEND
Therapy Recommended - Contraindication
Therapy Recommended - Drug indicated but not ordered

## 2022-12-30 NOTE — PROGRESS NOTE ADULT - ASSESSMENT
The patient is an 89 yo female with Hx. of CAD, COPD, CVA R inferior cerebellar infarct, HLD, HTN, cervical spondylosis, Hospitalized in  on 12/22 for weakness sec. to COVID 19 infection , was discharged to [facility] for CED on 12/22/22, admitted on 12/24 from [facility] for evaluation of slurred speech; upon admission was found to be in rapid afib; history per medical record as patient unable to provide history.     1. Patient admitted with pneumonia, superimposed on COVID-19 infection; noted with GIOVANNA, creatinine clearance less than 30  - follow up cultures   - serial cbc and monitor temperature   - reviewed prior medical records to evaluate for resistant or atypical pathogens   - iv hydration and supportive care   - day #7 cefepime, renally dosed; will stop today  - will start cancidas for severe odonophyagia  - tolerating antibiotics without rashes or side effects   - have stopped dexamethasone  - continue isolation  2. other issues; per medicine

## 2022-12-30 NOTE — PROGRESS NOTE ADULT - ASSESSMENT
89 CAD, COPD, CVA R inferior cerebellar infarct, HLD, HTN, cervical spondylosis, Hospitalized in  on 12/22 for weakness sec. to COVID 19 infection , was discharged to [facility] for CED on 12/22/22, was sent to the ER this am for slurred speech with renal evaluation of GIOVANNA.    GIOVANNA  likely secondary to contrast, hemodynamic as well but stabilizing today  K treatment with lokelma given  Would not be a good candidate for aggressive renal care with multi co-morbodities  No further contrast/NSAID's  agree with run of hypotonic fluid    AFIB  -Maintain map to optimize renal perfusion    Hyperkalemia  -Monitor values  -limit k in diet  d/c with rn staff, seen this AM and note now    dr Valdez to cover the weekend

## 2022-12-30 NOTE — PROGRESS NOTE ADULT - SUBJECTIVE AND OBJECTIVE BOX
REASON FOR VISIT: AF    HPI:  89 year old woman with a history of CAD, COPD, CVA, HLD, HTN, severe mitral stenosis, recent COVID-19 infection admitted with subacute CVA and a new diagnosis of AF.    12/26/22.  No new complaints.  12/27/22 frail deconditioned appearing tele AF , pressure low, Drop in H&H noted   12/28/22 Frail ill appearing tele AF rates poorly controlled   12/29/22 Seated in bedside chair tele AF rates improved   12/30/22:  Mrs. Tineo indicates that she feels "so-so."  She denies pain and says that she is not short-of-breath.    CV MEDICATIONS  (STANDING):  aspirin  chewable 81 milliGRAM(s) Oral daily  atorvastatin 40 milliGRAM(s) Oral at bedtime  diltiazem Injectable 10 milliGRAM(s) IV Push once  heparin  Infusion.  Unit(s)/Hr (14 mL/Hr) IV Continuous <Continuous>  metoprolol tartrate 25 milliGRAM(s) Oral three times a day    Vital Signs Last 24 Hrs  T(C): 36.7 (30 Dec 2022 08:39), Max: 36.8 (29 Dec 2022 15:48)  T(F): 98.1 (30 Dec 2022 08:39), Max: 98.3 (29 Dec 2022 15:48)  HR: 120 (30 Dec 2022 08:39) (75 - 144)  BP: 104/79 (30 Dec 2022 08:39) (93/62 - 135/79)  BP(mean): 72 (30 Dec 2022 06:59) (72 - 106)  RR: 26 (30 Dec 2022 08:39) (16 - 27)  SpO2: 99% (30 Dec 2022 08:39) (94% - 100%)    PHYSICAL EXAM:  Constitutional: Seated in bedside chair, little speech, appears frail and chronically-ill   Respiratory: Non-labored, no crackles  Cardiovascular: tachycardic (140s on tele)  Gastrointestinal: soft nontender.   Skin: Warm,, dry, multiple ecchymoses    LABS:                       9.3    16.11 )-----------( 295      ( 30 Dec 2022 08:47 )             28.7     146<H>  |  122<H>  |  109<H>  ----------------------------<  135<H>  5.6<H>   |  15<L>  |  3.78<H>    Ca    9.5      30 Dec 2022 08:47  Phos  5.2     12-30    TPro  x   /  Alb  2.8<L>  /  TBili  x   /  DBili  x   /  AST  x   /  ALT  x   /  AlkPhos  x   12-30    TroponinI hsT: 4851.10, 4870.41    MR Head No Cont (12.25.22 @ 10:34): Subacute infarct suspected involving the high right posterior frontal cortical/subcortical region.    TTE Echo Complete w/o Contrast w/ Doppler (12.27.22 @ 11:26):   The left ventricle is normal in size, wall motion and contractility. Mild concentric left ventricular hypertrophy. Estimated left ventricular ejection fraction is 65-70 %.   The left atrium is mildly dilated.   Normal appearing right atrium.   Normal appearing right ventricle structure and function.   Severe aortic stenosis.   Moderate to severe mitral stenosis.   Mild tricuspid valve regurgitation.    Tele: AF with RVR

## 2022-12-30 NOTE — PROGRESS NOTE ADULT - SUBJECTIVE AND OBJECTIVE BOX
Patient is a 89y Female who reports no complaints as new.     MEDICATIONS  (STANDING):  aspirin  chewable 81 milliGRAM(s) Oral daily  atorvastatin 40 milliGRAM(s) Oral at bedtime  Biotene Dry Mouth Oral Rinse 15 milliLiter(s) Swish and Spit four times a day  budesonide  80 MICROgram(s)/formoterol 4.5 MICROgram(s) Inhaler 2 Puff(s) Inhalation two times a day  caspofungin IVPB      caspofungin IVPB 70 milliGRAM(s) IV Intermittent once  cholecalciferol 1000 Unit(s) Oral daily  dextrose 5% + sodium chloride 0.45%. 1000 milliLiter(s) (60 mL/Hr) IV Continuous <Continuous>  diltiazem Infusion 7.5 mG/Hr (7.5 mL/Hr) IV Continuous <Continuous>  dorzolamide 2% Ophthalmic Solution 1 Drop(s) Both EYES three times a day  FIRST- Mouthwash  BLM 15 milliLiter(s) Swish and Spit four times a day  heparin  Infusion.  Unit(s)/Hr (14 mL/Hr) IV Continuous <Continuous>  latanoprost 0.005% Ophthalmic Solution 1 Drop(s) Both EYES at bedtime  metoprolol tartrate 25 milliGRAM(s) Oral three times a day  nystatin    Suspension 135528 Unit(s) Oral four times a day  polyethylene glycol 3350 17 Gram(s) Oral daily    MEDICATIONS  (PRN):  acetaminophen     Tablet .. 650 milliGRAM(s) Oral every 6 hours PRN Temp greater or equal to 38C (100.4F), Mild Pain (1 - 3)  ALPRAZolam 0.25 milliGRAM(s) Oral two times a day PRN anxiety  heparin   Injectable 3000 Unit(s) IV Push every 6 hours PRN For aPTT between 40 - 57  heparin   Injectable 6500 Unit(s) IV Push every 6 hours PRN For aPTT less than 40        T(C): , Max: 36.7 (12-30-22 @ 08:39)  T(F): , Max: 98.1 (12-30-22 @ 08:39)  HR: 141 (12-30-22 @ 15:30)  BP: 116/89 (12-30-22 @ 15:30)  BP(mean): 72 (12-30-22 @ 06:59)  RR: 22 (12-30-22 @ 15:30)  SpO2: 100% (12-30-22 @ 15:30)  Wt(kg): --    12-29 @ 07:01  -  12-30 @ 07:00  --------------------------------------------------------  IN: 80 mL / OUT: 1250 mL / NET: -1170 mL          PHYSICAL EXAM:    Constitutional: frail, ill appearing  HEENT:  MM  Neck: No LAD, No JVD  Respiratory: CTAB  Cardiovascular: S1 and S2   Gastrointestinal: soft  Extremities: peripheral edema  Neurological: Alert    LABS:                        9.3    16.11 )-----------( 295      ( 30 Dec 2022 08:47 )             28.7     30 Dec 2022 08:47    146    |  122    |  109    ----------------------------<  135    5.6     |  15     |  3.78   29 Dec 2022 08:47    143    |  118    |  104    ----------------------------<  154    5.6     |  17     |  4.03   28 Dec 2022 07:47    144    |  117    |  102    ----------------------------<  153    5.4     |  16     |  4.01   27 Dec 2022 08:33    141    |  115    |  85     ----------------------------<  171    5.4     |  17     |  3.49     Ca    9.5        30 Dec 2022 08:47  Ca    9.4        29 Dec 2022 08:47  Ca    9.0        28 Dec 2022 07:47  Ca    8.9        27 Dec 2022 08:33  Phos  5.2       30 Dec 2022 08:47    TPro  x      /  Alb  2.8    /  TBili  x      /  DBili  x      /  AST  x      /  ALT  x      /  AlkPhos  x      30 Dec 2022 08:47  TPro  5.5    /  Alb  2.2    /  TBili  0.4    /  DBili  x      /  AST  16     /  ALT  22     /  AlkPhos  60     27 Dec 2022 08:33          Urine Studies:          RADIOLOGY & ADDITIONAL STUDIES:

## 2022-12-31 NOTE — PROGRESS NOTE ADULT - PROBLEM SELECTOR PROBLEM 2
Atrial fibrillation
Valvular heart disease

## 2022-12-31 NOTE — PROGRESS NOTE ADULT - PROBLEM SELECTOR PLAN 1
CVA in setting of recent COVID-19 infection & newly diagnosed AF
CVA in setting of recent COVID-19 infection/ newly diagnosed AF  cannot R/O  embolic source due to atrial fibrillation   will need chronic anticoagulation once stable ( See below )    When anticoagulated Warfarin may be best drug given severe mitral stenosis
Uncontrolled ventricular rate -- multifactorial, including sepsis, missed PO metoprolol, intravascular volume depletion, no nutrition.    Continue IV diltiazem infusion; change metoprolol to IV route, IVF as tolerated (severe valvular stenoses).
CVA in setting of recent COVID-19 infection/ newly diagnosed AF  cannot R/O  embolic source due to atrial fibrillation   will need chronic anticoagulation once stable ( See below )
Subacute CVA in setting of recent COVID-19 infection and newly diagnosed AF.
CVA in setting of recent COVID-19 infection/ newly diagnosed AF  cannot R/O  embolic source due to atrial fibrillation   will need chronic anticoagulation once stable ( See below )    When anticoagulated Warfarin may be best drug given severe mitral stenosis

## 2022-12-31 NOTE — PROGRESS NOTE ADULT - PROBLEM SELECTOR PLAN 3
Severe MS Severe AS - poor candidate for intervention.  Diuretics held
Severe MS/ AS - poor candidate for intervention [facility] Hospitalist DNR/DNI  MOLST form signed with Pts son who is HCP Palliative eval to further discuss goals of care   Use Diuretics cautiously with AS/GIOVANNA
Severe aortic and mitral stenosis -- poor candidate for intervention and with poor overall prognosis,
Severe MS - poor candidate for intervention.
CVA in setting of recent COVID-19 infection & newly diagnosed AF
Severe MS - poor candidate for intervention.
Call 911 for stroke/Need for follow up after discharge/Prescribed medications/Risk factors for stroke/Stroke education booklet/Stroke support groups for patients, families, and friends/Stroke warning signs and symptoms/Signs and symptoms of stroke

## 2022-12-31 NOTE — PROGRESS NOTE ADULT - SUBJECTIVE AND OBJECTIVE BOX
REASON FOR VISIT: AF    HPI:  89 year old woman with a history of CAD, COPD, CVA, HLD, HTN, severe mitral stenosis, recent COVID-19 infection admitted with subacute CVA and a new diagnosis of AF.    12/26/22.  No new complaints.  12/27/22 frail deconditioned appearing tele AF , pressure low, Drop in H&H noted   12/28/22 Frail ill appearing tele AF rates poorly controlled   12/29/22 Seated in bedside chair tele AF rates improved   12/30/22:  Mrs. Tineo indicates that she feels "so-so."  She denies pain and says that she is not short-of-breath.  12/31/22:  No answering questions, nods heads head; moaning.    MEDICATIONS  (STANDING):  aspirin  chewable 81 milliGRAM(s) Oral daily  atorvastatin 40 milliGRAM(s) Oral at bedtime  Biotene Dry Mouth Oral Rinse 15 milliLiter(s) Swish and Spit four times a day  budesonide  80 MICROgram(s)/formoterol 4.5 MICROgram(s) Inhaler 2 Puff(s) Inhalation two times a day  caspofungin IVPB 50 milliGRAM(s) IV Intermittent every 24 hours  cholecalciferol 1000 Unit(s) Oral daily  dextrose 5% + sodium chloride 0.45%. 1000 milliLiter(s) (60 mL/Hr) IV Continuous <Continuous>  diltiazem Infusion 7.5 mG/Hr (7.5 mL/Hr) IV Continuous <Continuous>  dorzolamide 2% Ophthalmic Solution 1 Drop(s) Both EYES three times a day  FIRST- Mouthwash  BLM 15 milliLiter(s) Swish and Spit four times a day  latanoprost 0.005% Ophthalmic Solution 1 Drop(s) Both EYES at bedtime  metoprolol tartrate 25 milliGRAM(s) Oral three times a day  nystatin    Suspension 509432 Unit(s) Oral four times a day  polyethylene glycol 3350 17 Gram(s) Oral daily    Vital Signs Last 24 Hrs  T(C): 37.2 (31 Dec 2022 08:43), Max: 37.2 (31 Dec 2022 08:43)  T(F): 98.9 (31 Dec 2022 08:43), Max: 98.9 (31 Dec 2022 08:43)  HR: 120 (31 Dec 2022 08:43) (78 - 143)  BP: 159/84 (31 Dec 2022 08:43) (111/73 - 159/84)  RR: 18 (31 Dec 2022 08:43) (18 - 22)  SpO2: 98% (31 Dec 2022 08:43) (96% - 100%)    PHYSICAL EXAM:  Constitutional: Semirecumbent in bed, appears frail and chronically-ill   HEENT: Tongue very dry, no oral cyanosis  Respiratory: Non-labored but tachypneic  Cardiovascular: tachycardic   Gastrointestinal: soft nontender.   Skin: Warm,, dry, multiple ecchymoses    LABS:                             9.0    x     )-----------( x        ( 30 Dec 2022 19:41 )             27.1     x   |  x   |  x   ----------------------------<  x   5.4<H>   |  x   |  x     Ca    9.5      30 Dec 2022 08:47  Phos  5.2     12-30    TPro  x   /  Alb  2.8<L>  /  TBili  x   /  DBili  x   /  AST  x   /  ALT  x   /  AlkPhos  x   12-30    TroponinI hsT: <-4851.10, <-4870.41    MR Head No Cont (12.25.22 @ 10:34): Subacute infarct suspected involving the high right posterior frontal cortical/subcortical region.    TTE Echo Complete w/o Contrast w/ Doppler (12.27.22 @ 11:26):   The left ventricle is normal in size, wall motion and contractility. Mild concentric left ventricular hypertrophy. Estimated left ventricular ejection fraction is 65-70 %.   The left atrium is mildly dilated.   Normal appearing right atrium.   Normal appearing right ventricle structure and function.   Severe aortic stenosis.   Moderate to severe mitral stenosis.   Mild tricuspid valve regurgitation.    Tele: AF with RVR

## 2022-12-31 NOTE — PROGRESS NOTE ADULT - PROBLEM SELECTOR PLAN 2
Severe aortic and mitral stenosis -- poor candidate for intervention and with poor overall prognosis,    Overall prognosis is grave; consider palliative / comfort care.

## 2022-12-31 NOTE — PROGRESS NOTE ADULT - ASSESSMENT
89 yo female with Hx. of CAD, COPD, CVA R inferior cerebellar infarct, HLD, HTN, cervical spondylosis admitted for:       # New Onset Atrial fibrillation with RVR  - c/w tele:  In  AFIB, rate up tp 140s  - Restarted on Cardizem   drip  - refusing  PO metoprolol  - gentle hydration as pt has no Po Intake   - Started on Heparin drip this am, called by RN that noted to have hematuria,  will hold heparin   -D/w Dr Gonzalez         # Subacute CVA  - not a candidate for TPA on admission   - MRI brain: Subacute infarct suspected involving the high right posterior frontal cortical/subcortical region.  - C/w ASA  - C/w statin   - Speech pathology  - PT  - is not tolerating A/c     # Elevated troponin,  NSTEMI type II  in settings of acute infection and  AFIB   ECHO:  `+2-3 MS, EF 65-70%, No RWMA, + LVH, severe AS  C/w ASA,  statin  On metoprolol   D/w Cardio , conservative management       # Acute Hypoxic respiratory failure due to  COVID 19 PNA  -  On 2L NC, monitor pulse ox   - Not candidate for Remdesivir due to GIOVANNA   - off  Decadron   - supportive care    # Acute renal failure due to contrast nephropathy   # Hyperkalemia  - monitor renal function, Cr start trending down   -s/p IVF , 1U PRBcs   - C/w  rowley   - Strict is and os   - D/w DR Awan     # Acute On Chronic  blood loss 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   - repeat CXR ,   - On  Cefepime    IV  - D/w DR Rowe, will start IV antifungals  - magic mouth wash and nystatin  for  supportive care             # ACP:  DNR/DNI   MOLST form signed with Pts son who is HCP  Palliative eval to further discuss goals and D/c planning in progress  89 yo female with Hx. of CAD, COPD, CVA R inferior cerebellar infarct, HLD, HTN, cervical spondylosis admitted for:       # New Onset Atrial fibrillation with RVR  - c/w tele:  In  AFIB, rate up tp 140-150s,   -  on Cardizem   drip  - metoprolol changed to IV   - S/p IVF, worsening of edema, will stop   - Not on A/c as does not tolerate ( developed hematuria)   -D/w Dr Gonzalez         # Subacute CVA  - not a candidate for TPA on admission   - MRI brain: Subacute infarct suspected involving the high right posterior frontal cortical/subcortical region.  - On  ASA,  statin   - D/w Speech pathology, Pt with significant dysphagia due to stroke  - is not tolerating A/c     # Elevated troponin,  NSTEMI type II  in settings of acute infection and  AFIB   ECHO:  `+2-3 MS, EF 65-70%, No RWMA, + LVH, severe AS  C/w ASA,  statin  On metoprolol       # Acute Hypoxic respiratory failure due to  COVID 19 PNA  -  On 2L NC, monitor pulse ox   - Not candidate for Remdesivir due to GIOVANNA   - off  Decadron   - supportive care    # Acute renal failure due to contrast nephropathy   # Hyperkalemia  - monitor renal function, Cr start trending down   -s/p IVF , 1U PRBcs   - C/w  rowley       # Acute On Chronic  blood loss 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   - repeat CXR  with some improvement   - Completed  Cefepime    IV  - On IV antifungals  - magic mouth wash and nystatin  for  supportive care        # ACP:  Pts worsening condition and Goals discussed, poor prognosis.  Pts Vincent Tineo is HCP, wants pt ro be comfort care.   DNR/DNI   New MOLST form signed   Will d/c all Po meds,  start on Dilaudid IV for dyspnea and pain, Ativan for agitation and anxiety   Will further d/c SW  referral to [facility] Hosp[facility]

## 2022-12-31 NOTE — PROGRESS NOTE ADULT - SUBJECTIVE AND OBJECTIVE BOX
Patient is a 89y Female who reports no complaints as new.     12/31  pt in bed NAD   but apparent disorientation     MEDICATIONS  (STANDING):  aspirin  chewable 81 milliGRAM(s) Oral daily  atorvastatin 40 milliGRAM(s) Oral at bedtime  Biotene Dry Mouth Oral Rinse 15 milliLiter(s) Swish and Spit four times a day  budesonide  80 MICROgram(s)/formoterol 4.5 MICROgram(s) Inhaler 2 Puff(s) Inhalation two times a day  caspofungin IVPB      caspofungin IVPB 70 milliGRAM(s) IV Intermittent once  cholecalciferol 1000 Unit(s) Oral daily  dextrose 5% + sodium chloride 0.45%. 1000 milliLiter(s) (60 mL/Hr) IV Continuous <Continuous>  diltiazem Infusion 7.5 mG/Hr (7.5 mL/Hr) IV Continuous <Continuous>  dorzolamide 2% Ophthalmic Solution 1 Drop(s) Both EYES three times a day  FIRST- Mouthwash  BLM 15 milliLiter(s) Swish and Spit four times a day  heparin  Infusion.  Unit(s)/Hr (14 mL/Hr) IV Continuous <Continuous>  latanoprost 0.005% Ophthalmic Solution 1 Drop(s) Both EYES at bedtime  metoprolol tartrate 25 milliGRAM(s) Oral three times a day  nystatin    Suspension 726072 Unit(s) Oral four times a day  polyethylene glycol 3350 17 Gram(s) Oral daily    MEDICATIONS  (PRN):  acetaminophen     Tablet .. 650 milliGRAM(s) Oral every 6 hours PRN Temp greater or equal to 38C (100.4F), Mild Pain (1 - 3)  ALPRAZolam 0.25 milliGRAM(s) Oral two times a day PRN anxiety  heparin   Injectable 3000 Unit(s) IV Push every 6 hours PRN For aPTT between 40 - 57  heparin   Injectable 6500 Unit(s) IV Push every 6 hours PRN For aPTT less than 40        Vital Signs Last 24 Hrs  T(C): 37.2 (31 Dec 2022 08:43), Max: 37.2 (31 Dec 2022 08:43)  T(F): 98.9 (31 Dec 2022 08:43), Max: 98.9 (31 Dec 2022 08:43)  HR: 120 (31 Dec 2022 08:43) (78 - 150)  BP: 159/84 (31 Dec 2022 08:43) (118/71 - 159/84)  BP(mean): --  RR: 18 (31 Dec 2022 08:43) (18 - 18)  SpO2: 98% (31 Dec 2022 08:43) (96% - 99%)    Parameters below as of 31 Dec 2022 08:43  Patient On (Oxygen Delivery Method): room air        PHYSICAL EXAM:    Constitutional: frail, ill appearing  poor oral hygiene disoriented,  HEENT:  MM  Neck: No LAD, No JVD  Respiratory: CTAB  Cardiovascular: S1 and S2   Gastrointestinal: soft  Extremities: peripheral edema  Neurological: Alert    LABS:      12-30    x   |  x   |  x   ----------------------------<  x   5.4<H>   |  x   |  x     Ca    9.5      30 Dec 2022 08:47  Phos  5.2     12-30    TPro  x   /  Alb  2.8<L>  /  TBili  x   /  DBili  x   /  AST  x   /  ALT  x   /  AlkPhos  x   12-30                          9.3    16.11 )-----------( 295      ( 30 Dec 2022 08:47 )             28.7     30 Dec 2022 08:47    146    |  122    |  109    ----------------------------<  135    5.6     |  15     |  3.78   29 Dec 2022 08:47    143    |  118    |  104    ----------------------------<  154    5.6     |  17     |  4.03   28 Dec 2022 07:47    144    |  117    |  102    ----------------------------<  153    5.4     |  16     |  4.01   27 Dec 2022 08:33    141    |  115    |  85     ----------------------------<  171    5.4     |  17     |  3.49     Ca    9.5        30 Dec 2022 08:47  Ca    9.4        29 Dec 2022 08:47  Ca    9.0        28 Dec 2022 07:47  Ca    8.9        27 Dec 2022 08:33  Phos  5.2       30 Dec 2022 08:47    TPro  x      /  Alb  2.8    /  TBili  x      /  DBili  x      /  AST  x      /  ALT  x      /  AlkPhos  x      30 Dec 2022 08:47  TPro  5.5    /  Alb  2.2    /  TBili  0.4    /  DBili  x      /  AST  16     /  ALT  22     /  AlkPhos  60     27 Dec 2022 08:33          Urine Studies:          RADIOLOGY & ADDITIONAL STUDIES:

## 2022-12-31 NOTE — PROGRESS NOTE ADULT - PROBLEM SELECTOR PROBLEM 1
CVA (cerebrovascular accident)
Atrial fibrillation
CVA (cerebrovascular accident)
CVA (cerebrovascular accident)

## 2022-12-31 NOTE — PROGRESS NOTE ADULT - PROBLEM/PLAN-2
DISPLAY PLAN FREE TEXT
Unknown if ever smoked

## 2022-12-31 NOTE — PROGRESS NOTE ADULT - SUBJECTIVE AND OBJECTIVE BOX
CC: CVA, MI, Atrial fib.,     HPI: 89 yo female with Hx. of CAD, COPD, CVA R inferior cerebellar infarct, HLD, HTN, cervical spondylosis, Hospitalized in  on 12/22 for weakness sec. to COVID 19 infection , was discharged to [place] for CED on 12/22/22, was sent to the ER this am for slurred speech. The patient son states he did a face time video with her around 11 am and found her to have slurred speech and hoarse voice.  In the ER she was found with new onset Atrial fib with RVR , Left sided weakness,  was treated with IV Cardizem by ER MD. Low Bp treated with IV NS.  Troponin 4870,     INTERVAL HPI/ OVERNIGHT EVENTS:   OOB to chair, refusing PO         MEDICATIONS  (STANDING):  aspirin  chewable 81 milliGRAM(s) Oral daily  atorvastatin 40 milliGRAM(s) Oral at bedtime  Biotene Dry Mouth Oral Rinse 15 milliLiter(s) Swish and Spit four times a day  budesonide  80 MICROgram(s)/formoterol 4.5 MICROgram(s) Inhaler 2 Puff(s) Inhalation two times a day  caspofungin IVPB      caspofungin IVPB 50 milliGRAM(s) IV Intermittent every 24 hours  cholecalciferol 1000 Unit(s) Oral daily  dextrose 5% + sodium chloride 0.45%. 1000 milliLiter(s) (60 mL/Hr) IV Continuous <Continuous>  diltiazem Infusion 15 mG/Hr (15 mL/Hr) IV Continuous <Continuous>  dorzolamide 2% Ophthalmic Solution 1 Drop(s) Both EYES three times a day  FIRST- Mouthwash  BLM 15 milliLiter(s) Swish and Spit four times a day  latanoprost 0.005% Ophthalmic Solution 1 Drop(s) Both EYES at bedtime  metoprolol tartrate Injectable 2.5 milliGRAM(s) IV Push every 6 hours  nystatin    Suspension 985030 Unit(s) Oral four times a day  polyethylene glycol 3350 17 Gram(s) Oral daily    MEDICATIONS  (PRN):  acetaminophen     Tablet .. 650 milliGRAM(s) Oral every 6 hours PRN Temp greater or equal to 38C (100.4F), Mild Pain (1 - 3)  ALPRAZolam 0.25 milliGRAM(s) Oral two times a day PRN anxiety  LORazepam   Injectable 0.25 milliGRAM(s) IV Push every 8 hours PRN Anxiety          REVIEW OF SYSTEMS:  All other review of systems is negative unless indicated above.      PHYSICAL EXAM:  General: Well developed; uncomfortable, , on  RA   Eyes:  EOMI; conjunctiva and sclera clear  Head: Normocephalic; atraumatic  ENMT: No nasal discharge;  oral mucosa with secretion and dark coverage on the  tongue,  dry lips   Neck: Supple; non tender; no masses  Respiratory: Diminished BS b/l,  no wheezes  Cardiovascular: Irregular rate and rhythm. S1 and S2 Normal; + Murmur   Gastrointestinal: Soft non-tender non-distended; Normal bowel sounds  Genitourinary:  Sosa in place   Extremities: edema  improved   Vascular: Peripheral pulses palpable 2+ bilaterally  Neurological: Alert and oriented x 2-3, non focal   Skin: Warm and dry. Multiple  ecchymotic changes UE b/l.    Musculoskeletal: Normal muscle tone, without deformities  Psychiatric: Cooperative and anxious     LABS:                           9.3    16.11 )-----------( 295      ( 30 Dec 2022 08:47 )             28.7     12-30    146<H>  |  122<H>  |  109<H>  ----------------------------<  135<H>  5.6<H>   |  15<L>  |  3.78<H>    Ca    9.5      30 Dec 2022 08:47  Phos  5.2     12-30    TPro  x   /  Alb  2.8<L>  /  TBili  x   /  DBili  x   /  AST  x   /  ALT  x   /  AlkPhos  x   12-30  LIVER FUNCTIONS - ( 30 Dec 2022 08:47 )  Alb: 2.8 g/dL / Pro: x     / ALK PHOS: x     / ALT: x     / AST: x     / GGT: x           PT/INR - ( 30 Dec 2022 08:47 )   PT: 13.4 sec;   INR: 1.15 ratio    PTT - ( 30 Dec 2022 08:47 )  PTT:23.4 sec                              9.0    11.21 )-----------( 253      ( 29 Dec 2022 08:47 )             26.4     12-29    143  |  118<H>  |  104<H>  ----------------------------<  154<H>  5.6<H>   |  17<L>  |  4.03<H>    Ca    9.4      29 Dec 2022 08:47                            7.1    10.60 )-----------( 252      ( 28 Dec 2022 07:47 )             22.3     12-28    144  |  117<H>  |  102<H>  ----------------------------<  153<H>  5.4<H>   |  16<L>  |  4.01<H>    Ca    9.0      28 Dec 2022 07:47    TPro  5.5<L>  /  Alb  2.2<L>  /  TBili  0.4  /  DBili  x   /  AST  16  /  ALT  22  /  AlkPhos  60  12-27      LIVER FUNCTIONS - ( 27 Dec 2022 08:33 )  Alb: 2.2 g/dL / Pro: 5.5 gm/dL / ALK PHOS: 60 U/L / ALT: 22 U/L / AST: 16 U/L / GGT: x         PT/INR - ( 27 Dec 2022 08:33 )   PT: 14.1 sec;   INR: 1.21 ratio      PTT - ( 27 Dec 2022 17:24 )  PTT:30.6 sec                          7.9    x     )-----------( x        ( 27 Dec 2022 14:03 )             24.4     27 Dec 2022 08:33    141    |  115    |  85     ----------------------------<  171    5.4     |  17     |  3.49     Ca    8.9        27 Dec 2022 08:33    TPro  5.5    /  Alb  2.2    /  TBili  0.4    /  DBili  x      /  AST  16     /  ALT  22     /  AlkPhos  60     27 Dec 2022 08:33    PT/INR - ( 27 Dec 2022 08:33 )   PT: 14.1 sec;   INR: 1.21 ratio         PTT - ( 27 Dec 2022 08:33 )  PTT:128.6 sec  CAPILLARY BLOOD GLUCOSE        LIVER FUNCTIONS - ( 27 Dec 2022 08:33 )  Alb: 2.2 g/dL / Pro: 5.5 gm/dL / ALK PHOS: 60 U/L / ALT: 22 U/L / AST: 16 U/L / GGT: x               MEDICATIONS  (STANDING):  aspirin  chewable 81 milliGRAM(s) Oral daily  atorvastatin 40 milliGRAM(s) Oral at bedtime  Biotene Dry Mouth Oral Rinse 15 milliLiter(s) Swish and Spit four times a day  budesonide  80 MICROgram(s)/formoterol 4.5 MICROgram(s) Inhaler 2 Puff(s) Inhalation two times a day  cefepime  Injectable. 1000 milliGRAM(s) IV Push every 24 hours  cholecalciferol 1000 Unit(s) Oral daily  dextrose 5% + sodium chloride 0.45%. 1000 milliLiter(s) (60 mL/Hr) IV Continuous <Continuous>  diltiazem Infusion 5 mG/Hr (5 mL/Hr) IV Continuous <Continuous>  dorzolamide 2% Ophthalmic Solution 1 Drop(s) Both EYES three times a day  FIRST- Mouthwash  BLM 15 milliLiter(s) Swish and Spit four times a day  heparin  Infusion.  Unit(s)/Hr (14 mL/Hr) IV Continuous <Continuous>  latanoprost 0.005% Ophthalmic Solution 1 Drop(s) Both EYES at bedtime  metoprolol tartrate 25 milliGRAM(s) Oral three times a day  nystatin    Suspension 815205 Unit(s) Oral four times a day  polyethylene glycol 3350 17 Gram(s) Oral daily    MEDICATIONS  (PRN):  acetaminophen     Tablet .. 650 milliGRAM(s) Oral every 6 hours PRN Temp greater or equal to 38C (100.4F), Mild Pain (1 - 3)  ALPRAZolam 0.25 milliGRAM(s) Oral two times a day PRN anxiety  heparin   Injectable 6500 Unit(s) IV Push every 6 hours PRN For aPTT less than 40  heparin   Injectable 3000 Unit(s) IV Push every 6 hours PRN For aPTT between 40 - 57        RADIOLOGY & ADDITIONAL TESTS:        ACC: [id number] EXAM:  US KIDNEYS AND BLADDER                        PROCEDURE DATE:  12/26/2022    FINDINGS:  Right kidney: 9.2 cm. Limited. No hydronephrosis.    Left kidney: 9.6 cm. Limited. No hydronephrosis.    Urinary bladder: Unremarkable.    IMPRESSION:    Limited study.    No hydronephrosis.            ACC: [id number] EXAM:  MR BRAIN                          *** ADDENDUM***    Please note area of abnormal T2 prolongation with restricted diffusion is   seen involving the high right posterior frontal cortical and subcortical   region not the left side as stated in the body of the report.    --- End of Report ---    *** END OF ADDENDUM***      PROCEDURE DATE:  12/25/2022          INTERPRETATION:  Clinical indication: Dysarthria. Left facial droop.    MRI of the brain was performed using sagittal T1 axial T1 T2 T2 FLAIR   diffusion and susceptibility weighted sequence    This exam is compared with prior brain MRI performed on January 14, 2015   and prior CT scan performed on December 24, 2022.    Extensive parenchymal volume loss and chronic microvascular ischemic   changes are identified.    There is evidence of a new vague area of T2 prolongation and restricted   diffusion seen involving the high left posterior frontal   cortical/subcortical region. This is best seen on series 5 image 51 and   does demonstrate decreased signal on the ADC mapping. This could be   compatible with a subacute infarct. No hemorrhagic transformation is   seen. No significant shift or herniation is seen.    Encephalomalacia and gliosis involving the inferiorright cerebellar   region is identified. This chronic appearing infarct has demonstrated   expected evolutionary changes when compared with the prior brain MRI.    There are no abnormal intra or extra-axial collections seen.    The large vessels demonstrate normal flow-voids.    Extensive mucosal thickening is seen involving both sphenoid sinus with   air-fluid levels involving the right sphenoid sinus. Bilateral ethmoid   sinus mucosal thickening is seen. The patient is status post bilateral   cataract surgery. Both mastoid and middle ear regions appear clear.    IMPRESSION: Subacute infarct suspected involving the high right posterior   frontal cortical/subcortical region.        < from: CT Chest No Cont (12.27.22 @ 16:41) >    ACC: [id number] EXAM:  CT ABDOMEN AND PELVIS                        ACC: [id number] EXAM:  CT CHEST                          PROCEDURE DATE:  12/27/2022          INTERPRETATION:  CLINICAL INFORMATION: Anemia    COMPARISON: CT chest 12/15/2021    CONTRAST/COMPLICATIONS:  IV Contrast: NONE  Oral Contrast: NONE  Complications: None reported at time of study completion    PROCEDURE:  CT of the Chest, Abdomen and Pelvis was performed.  Sagittal and coronal reformats were performed.    FINDINGS:  CHEST:  LUNGS AND LARGE AIRWAYS: Patent central airways. A 4 mm left upper lobe   nodule, new since prior study (2:12). Tree-in-bud nodules in bilateral   lower lobes. Patchy groundglass opacities in the bilateral upper lobes.  PLEURA: No pleural effusion.  VESSELS: Atherosclerotic changes of the aorta and coronary arteries.  HEART: Heart size is normal. No pericardial effusion.  MEDIASTINUM AND CRISTHIAN: No lymphadenopathy. Moderate hiatal hernia  CHEST WALL AND LOWER NECK: Heterogeneous thyroid gland..    ABDOMEN AND PELVIS: Motion degradation limits evaluation of the bowel.  LIVER: Within normal limits.  BILE DUCTS: Normal caliber.  GALLBLADDER: Vicarious excretion of contrast.  SPLEEN: Within normal limits.  PANCREAS: Within normal limits.  ADRENALS:Within normal limits.  KIDNEYS/URETERS: Persistent nephrograms. Exophytic hypodense right   interpolar lesion measures 1.7 cm, previously noted to be hyperdense.    BLADDER: Within normal limits.  REPRODUCTIVE ORGANS: A 2.2 cm right adnexal cyst. 1.4cm left adnexal   cyst. Calcified fibroid.    BOWEL: No bowel obstruction. Sigmoid diverticulosis. Normal appendix. The   bowel is poorly evaluated due to motion artifact.  PERITONEUM: No ascites.  VESSELS: Within normal limits.  RETROPERITONEUM/LYMPH NODES: Moderate presacral edema. No lymphadenopathy.  ABDOMINAL WALL: Within normal limits.  BONES: Degenerative changes.    IMPRESSION:  Bilateral lower lobe tree-in-bud nodules and patchy groundglass opacities   may represent atypical infection.    New 4 mm left upper lobe pulmonary nodule. Recommend attention on   follow-up.    Motion limited images of the abdomen. Grossly no bowel obstruction.    Persistent bilateral nephrograms. Patient received IV contrast 12/24/2022   for CTA Head and Neck. Recommend monitoring patient's serum creatinine   level to monitor for contrast-induced nephropathy.         CC: CVA, MI, Atrial fib.,     HPI: 89 yo female with Hx. of CAD, COPD, CVA R inferior cerebellar infarct, HLD, HTN, cervical spondylosis, Hospitalized in  on 12/22 for weakness sec. to COVID 19 infection , was discharged to [place] for CED on 12/22/22, was sent to the ER this am for slurred speech. The patient son states he did a face time video with her around 11 am and found her to have slurred speech and hoarse voice.  In the ER she was found with new onset Atrial fib with RVR , Left sided weakness,  was treated with IV Cardizem by ER MD. Low Bp treated with IV NS.  Troponin 4870,     INTERVAL HPI/ OVERNIGHT EVENTS:   Pt was  seen and examined awake, confused,  looks uncomfortable, non verbal. Trying to communicate but speech is garbled.     Vital Signs Last 24 Hrs  T(C): 37.2 (31 Dec 2022 08:43), Max: 37.2 (31 Dec 2022 08:43)  T(F): 98.9 (31 Dec 2022 08:43), Max: 98.9 (31 Dec 2022 08:43)  HR: 120 (31 Dec 2022 08:43) (78 - 150)  BP: 159/84 (31 Dec 2022 08:43) (118/71 - 159/84)  RR: 18 (31 Dec 2022 08:43) (18 - 18)  SpO2: 98% (31 Dec 2022 08:43) (96% - 99%)    Parameters below as of 31 Dec 2022 08:43  Patient On (Oxygen Delivery Method): room air      Vital Signs Last 24 Hrs  T(C): 37.2 (31 Dec 2022 08:43), Max: 37.2 (31 Dec 2022 08:43)  T(F): 98.9 (31 Dec 2022 08:43), Max: 98.9 (31 Dec 2022 08:43)  HR: 120 (31 Dec 2022 08:43) (78 - 150)  BP: 159/84 (31 Dec 2022 08:43) (118/71 - 159/84)  RR: 18 (31 Dec 2022 08:43) (18 - 18)  SpO2: 98% (31 Dec 2022 08:43) (96% - 99%)    Parameters below as of 31 Dec 2022 08:43  Patient On (Oxygen Delivery Method): room air        REVIEW OF SYSTEMS:  All other review of systems is negative unless indicated above.      PHYSICAL EXAM:  General: Well developed; uncomfortable, dyspneic   Eyes:  EOMI; conjunctiva and sclera clear  Head: Normocephalic; atraumatic  ENMT: No nasal discharge;  oral mucosa with secretion and dark coverage on the  tongue,  dry lips   Neck: Supple; non tender; no masses, + JVD   Respiratory: Diminished BS b/l,  + rales   Cardiovascular: Irregular rate and rhythm. S1 and S2 Normal; + Murmur   Gastrointestinal: Soft non-tender non-distended; Normal bowel sounds  Genitourinary:  Sosa in place   Extremities: edema   worse   Vascular: Peripheral pulses palpable 2+ bilaterally  Neurological: Alert and oriented, unable to verbalize.   Skin: Warm and dry. Multiple  ecchymotic changes UE b/l.    Psychiatric: Cooperative and anxious     LABS:                         8.8    13.32 )-----------( 264      ( 31 Dec 2022 16:54 )             26.2     12-30    x   |  x   |  x   ----------------------------<  x   5.4<H>   |  x   |  x     Ca    9.5      30 Dec 2022 08:47  Phos  5.2     12-30    TPro  x   /  Alb  2.8<L>  /  TBili  x   /  DBili  x   /  AST  x   /  ALT  x   /  AlkPhos  x   12-30                            9.3    16.11 )-----------( 295      ( 30 Dec 2022 08:47 )             28.7     12-30    146<H>  |  122<H>  |  109<H>  ----------------------------<  135<H>  5.6<H>   |  15<L>  |  3.78<H>    Ca    9.5      30 Dec 2022 08:47  Phos  5.2     12-30    TPro  x   /  Alb  2.8<L>  /  TBili  x   /  DBili  x   /  AST  x   /  ALT  x   /  AlkPhos  x   12-30  LIVER FUNCTIONS - ( 30 Dec 2022 08:47 )  Alb: 2.8 g/dL / Pro: x     / ALK PHOS: x     / ALT: x     / AST: x     / GGT: x           PT/INR - ( 30 Dec 2022 08:47 )   PT: 13.4 sec;   INR: 1.15 ratio    PTT - ( 30 Dec 2022 08:47 )  PTT:23.4 sec                              9.0    11.21 )-----------( 253      ( 29 Dec 2022 08:47 )             26.4     12-29    143  |  118<H>  |  104<H>  ----------------------------<  154<H>  5.6<H>   |  17<L>  |  4.03<H>    Ca    9.4      29 Dec 2022 08:47                            7.1    10.60 )-----------( 252      ( 28 Dec 2022 07:47 )             22.3     12-28    144  |  117<H>  |  102<H>  ----------------------------<  153<H>  5.4<H>   |  16<L>  |  4.01<H>    Ca    9.0      28 Dec 2022 07:47    TPro  5.5<L>  /  Alb  2.2<L>  /  TBili  0.4  /  DBili  x   /  AST  16  /  ALT  22  /  AlkPhos  60  12-27      LIVER FUNCTIONS - ( 27 Dec 2022 08:33 )  Alb: 2.2 g/dL / Pro: 5.5 gm/dL / ALK PHOS: 60 U/L / ALT: 22 U/L / AST: 16 U/L / GGT: x         PT/INR - ( 27 Dec 2022 08:33 )   PT: 14.1 sec;   INR: 1.21 ratio      PTT - ( 27 Dec 2022 17:24 )  PTT:30.6 sec                          7.9    x     )-----------( x        ( 27 Dec 2022 14:03 )             24.4     27 Dec 2022 08:33    141    |  115    |  85     ----------------------------<  171    5.4     |  17     |  3.49     Ca    8.9        27 Dec 2022 08:33    TPro  5.5    /  Alb  2.2    /  TBili  0.4    /  DBili  x      /  AST  16     /  ALT  22     /  AlkPhos  60     27 Dec 2022 08:33    PT/INR - ( 27 Dec 2022 08:33 )   PT: 14.1 sec;   INR: 1.21 ratio         PTT - ( 27 Dec 2022 08:33 )  PTT:128.6 sec  CAPILLARY BLOOD GLUCOSE        LIVER FUNCTIONS - ( 27 Dec 2022 08:33 )  Alb: 2.2 g/dL / Pro: 5.5 gm/dL / ALK PHOS: 60 U/L / ALT: 22 U/L / AST: 16 U/L / GGT: x               MEDICATIONS  (STANDING):  aspirin  chewable 81 milliGRAM(s) Oral daily  atorvastatin 40 milliGRAM(s) Oral at bedtime  Biotene Dry Mouth Oral Rinse 15 milliLiter(s) Swish and Spit four times a day  budesonide  80 MICROgram(s)/formoterol 4.5 MICROgram(s) Inhaler 2 Puff(s) Inhalation two times a day  cefepime  Injectable. 1000 milliGRAM(s) IV Push every 24 hours  cholecalciferol 1000 Unit(s) Oral daily  dextrose 5% + sodium chloride 0.45%. 1000 milliLiter(s) (60 mL/Hr) IV Continuous <Continuous>  diltiazem Infusion 5 mG/Hr (5 mL/Hr) IV Continuous <Continuous>  dorzolamide 2% Ophthalmic Solution 1 Drop(s) Both EYES three times a day  FIRST- Mouthwash  BLM 15 milliLiter(s) Swish and Spit four times a day  heparin  Infusion.  Unit(s)/Hr (14 mL/Hr) IV Continuous <Continuous>  latanoprost 0.005% Ophthalmic Solution 1 Drop(s) Both EYES at bedtime  metoprolol tartrate 25 milliGRAM(s) Oral three times a day  nystatin    Suspension 489789 Unit(s) Oral four times a day  polyethylene glycol 3350 17 Gram(s) Oral daily    MEDICATIONS  (PRN):  acetaminophen     Tablet .. 650 milliGRAM(s) Oral every 6 hours PRN Temp greater or equal to 38C (100.4F), Mild Pain (1 - 3)  ALPRAZolam 0.25 milliGRAM(s) Oral two times a day PRN anxiety  heparin   Injectable 6500 Unit(s) IV Push every 6 hours PRN For aPTT less than 40  heparin   Injectable 3000 Unit(s) IV Push every 6 hours PRN For aPTT between 40 - 57        RADIOLOGY & ADDITIONAL TESTS:        ACC: [id number] EXAM:  US KIDNEYS AND BLADDER                        PROCEDURE DATE:  12/26/2022    FINDINGS:  Right kidney: 9.2 cm. Limited. No hydronephrosis.    Left kidney: 9.6 cm. Limited. No hydronephrosis.    Urinary bladder: Unremarkable.    IMPRESSION:    Limited study.    No hydronephrosis.            ACC: [id number] EXAM:  MR BRAIN                          *** ADDENDUM***    Please note area of abnormal T2 prolongation with restricted diffusion is   seen involving the high right posterior frontal cortical and subcortical   region not the left side as stated in the body of the report.    --- End of Report ---    *** END OF ADDENDUM***      PROCEDURE DATE:  12/25/2022          INTERPRETATION:  Clinical indication: Dysarthria. Left facial droop.    MRI of the brain was performed using sagittal T1 axial T1 T2 T2 FLAIR   diffusion and susceptibility weighted sequence    This exam is compared with prior brain MRI performed on January 14, 2015   and prior CT scan performed on December 24, 2022.    Extensive parenchymal volume loss and chronic microvascular ischemic   changes are identified.    There is evidence of a new vague area of T2 prolongation and restricted   diffusion seen involving the high left posterior frontal   cortical/subcortical region. This is best seen on series 5 image 51 and   does demonstrate decreased signal on the ADC mapping. This could be   compatible with a subacute infarct. No hemorrhagic transformation is   seen. No significant shift or herniation is seen.    Encephalomalacia and gliosis involving the inferiorright cerebellar   region is identified. This chronic appearing infarct has demonstrated   expected evolutionary changes when compared with the prior brain MRI.    There are no abnormal intra or extra-axial collections seen.    The large vessels demonstrate normal flow-voids.    Extensive mucosal thickening is seen involving both sphenoid sinus with   air-fluid levels involving the right sphenoid sinus. Bilateral ethmoid   sinus mucosal thickening is seen. The patient is status post bilateral   cataract surgery. Both mastoid and middle ear regions appear clear.    IMPRESSION: Subacute infarct suspected involving the high right posterior   frontal cortical/subcortical region.        < from: CT Chest No Cont (12.27.22 @ 16:41) >    ACC: [id number] EXAM:  CT ABDOMEN AND PELVIS                        ACC: [id number] EXAM:  CT CHEST                          PROCEDURE DATE:  12/27/2022          INTERPRETATION:  CLINICAL INFORMATION: Anemia    COMPARISON: CT chest 12/15/2021    CONTRAST/COMPLICATIONS:  IV Contrast: NONE  Oral Contrast: NONE  Complications: None reported at time of study completion    PROCEDURE:  CT of the Chest, Abdomen and Pelvis was performed.  Sagittal and coronal reformats were performed.    FINDINGS:  CHEST:  LUNGS AND LARGE AIRWAYS: Patent central airways. A 4 mm left upper lobe   nodule, new since prior study (2:12). Tree-in-bud nodules in bilateral   lower lobes. Patchy groundglass opacities in the bilateral upper lobes.  PLEURA: No pleural effusion.  VESSELS: Atherosclerotic changes of the aorta and coronary arteries.  HEART: Heart size is normal. No pericardial effusion.  MEDIASTINUM AND CRISTHIAN: No lymphadenopathy. Moderate hiatal hernia  CHEST WALL AND LOWER NECK: Heterogeneous thyroid gland..    ABDOMEN AND PELVIS: Motion degradation limits evaluation of the bowel.  LIVER: Within normal limits.  BILE DUCTS: Normal caliber.  GALLBLADDER: Vicarious excretion of contrast.  SPLEEN: Within normal limits.  PANCREAS: Within normal limits.  ADRENALS:Within normal limits.  KIDNEYS/URETERS: Persistent nephrograms. Exophytic hypodense right   interpolar lesion measures 1.7 cm, previously noted to be hyperdense.    BLADDER: Within normal limits.  REPRODUCTIVE ORGANS: A 2.2 cm right adnexal cyst. 1.4cm left adnexal   cyst. Calcified fibroid.    BOWEL: No bowel obstruction. Sigmoid diverticulosis. Normal appendix. The   bowel is poorly evaluated due to motion artifact.  PERITONEUM: No ascites.  VESSELS: Within normal limits.  RETROPERITONEUM/LYMPH NODES: Moderate presacral edema. No lymphadenopathy.  ABDOMINAL WALL: Within normal limits.  BONES: Degenerative changes.    IMPRESSION:  Bilateral lower lobe tree-in-bud nodules and patchy groundglass opacities   may represent atypical infection.    New 4 mm left upper lobe pulmonary nodule. Recommend attention on   follow-up.    Motion limited images of the abdomen. Grossly no bowel obstruction.    Persistent bilateral nephrograms. Patient received IV contrast 12/24/2022   for CTA Head and Neck. Recommend monitoring patient's serum creatinine   level to monitor for contrast-induced nephropathy.      < from: Xray Chest 1 View-PORTABLE IMMEDIATE (Xray Chest 1 View-PORTABLE IMMEDIATE .) (12.30.22 @ 13:38) >  ACC: [id number] EXAM:  XR CHEST PORTABLE IMMED 1V                          PROCEDURE DATE:  12/30/2022          INTERPRETATION:  Frontal chest on December 30, 2022 at 12:46 PM. Patient   is short of breath and has leukocytosis.    Heart is enlarged and obscures the left base. Calcified mitral area again   noted.    There is persistent slight atelectasis in the lingula.    Chest is similar to December 24.    IMPRESSION: Unchanged chest as above.

## 2022-12-31 NOTE — PROGRESS NOTE ADULT - ASSESSMENT
89 CAD, COPD, CVA R inferior cerebellar infarct, HLD, HTN, cervical spondylosis, Hospitalized in  on 12/22 for weakness sec. to COVID 19 infection , was discharged to [place] for CED on 12/22/22, was sent to the ER this am for slurred speech with renal evaluation of GIOVANNA.    GIOVANNA  likely secondary to contrast, hemodynamic as well but stabilizing today  K treatment with lokelma given  Would not be a good candidate for aggressive renal care with multi co-morbodities  No further contrast/NSAID's  agree with run of hypotonic fluid    AFIB  -Maintain map to optimize renal perfusion    Hyperkalemia  -Monitor values  -limit k in diet  d/c with rn staff, seen this AM and note now    dr Valdez to cover the weekend    12/31  Potassium better  labs reviewed   cont IVF as apparent pt unable to have access to po intake on her own

## 2022-12-31 NOTE — PROGRESS NOTE ADULT - PROBLEM SELECTOR PROBLEM 3
CVA (cerebrovascular accident)
Valvular heart disease

## 2023-01-01 VITALS — RESPIRATION RATE: 26 BRPM

## 2023-01-01 DIAGNOSIS — E86.0 DEHYDRATION: ICD-10-CM

## 2023-01-01 DIAGNOSIS — I25.10 ATHEROSCLEROTIC HEART DISEASE OF NATIVE CORONARY ARTERY WITHOUT ANGINA PECTORIS: ICD-10-CM

## 2023-01-01 DIAGNOSIS — Z79.02 LONG TERM (CURRENT) USE OF ANTITHROMBOTICS/ANTIPLATELETS: ICD-10-CM

## 2023-01-01 DIAGNOSIS — Z86.73 PERSONAL HISTORY OF TRANSIENT ISCHEMIC ATTACK (TIA), AND CEREBRAL INFARCTION WITHOUT RESIDUAL DEFICITS: ICD-10-CM

## 2023-01-01 DIAGNOSIS — Z85.820 PERSONAL HISTORY OF MALIGNANT MELANOMA OF SKIN: ICD-10-CM

## 2023-01-01 DIAGNOSIS — J44.9 CHRONIC OBSTRUCTIVE PULMONARY DISEASE, UNSPECIFIED: ICD-10-CM

## 2023-01-01 DIAGNOSIS — M17.0 BILATERAL PRIMARY OSTEOARTHRITIS OF KNEE: ICD-10-CM

## 2023-01-01 DIAGNOSIS — Z79.51 LONG TERM (CURRENT) USE OF INHALED STEROIDS: ICD-10-CM

## 2023-01-01 DIAGNOSIS — E78.5 HYPERLIPIDEMIA, UNSPECIFIED: ICD-10-CM

## 2023-01-01 DIAGNOSIS — Z79.52 LONG TERM (CURRENT) USE OF SYSTEMIC STEROIDS: ICD-10-CM

## 2023-01-01 DIAGNOSIS — M43.12 SPONDYLOLISTHESIS, CERVICAL REGION: ICD-10-CM

## 2023-01-01 DIAGNOSIS — I10 ESSENTIAL (PRIMARY) HYPERTENSION: ICD-10-CM

## 2023-01-01 DIAGNOSIS — N39.0 URINARY TRACT INFECTION, SITE NOT SPECIFIED: ICD-10-CM

## 2023-01-01 DIAGNOSIS — M47.812 SPONDYLOSIS WITHOUT MYELOPATHY OR RADICULOPATHY, CERVICAL REGION: ICD-10-CM

## 2023-01-01 DIAGNOSIS — U07.1 COVID-19: ICD-10-CM

## 2023-01-01 PROCEDURE — 99239 HOSP IP/OBS DSCHRG MGMT >30: CPT

## 2023-01-01 PROCEDURE — 99233 SBSQ HOSP IP/OBS HIGH 50: CPT

## 2023-01-01 RX ORDER — SCOPALAMINE 1 MG/3D
1 PATCH, EXTENDED RELEASE TRANSDERMAL
Refills: 0 | Status: DISCONTINUED | OUTPATIENT
Start: 2023-01-01 | End: 2023-01-01

## 2023-01-01 RX ORDER — HYDROMORPHONE HYDROCHLORIDE 2 MG/ML
1.5 INJECTION INTRAMUSCULAR; INTRAVENOUS; SUBCUTANEOUS
Refills: 0 | Status: DISCONTINUED | OUTPATIENT
Start: 2023-01-01 | End: 2023-01-01

## 2023-01-01 RX ORDER — ROBINUL 0.2 MG/ML
0.2 INJECTION INTRAMUSCULAR; INTRAVENOUS EVERY 6 HOURS
Refills: 0 | Status: DISCONTINUED | OUTPATIENT
Start: 2023-01-01 | End: 2023-01-01

## 2023-01-01 RX ORDER — HYDROMORPHONE HYDROCHLORIDE 2 MG/ML
0.5 INJECTION INTRAMUSCULAR; INTRAVENOUS; SUBCUTANEOUS
Refills: 0 | Status: DISCONTINUED | OUTPATIENT
Start: 2023-01-01 | End: 2023-01-01

## 2023-01-01 RX ORDER — HYDROMORPHONE HYDROCHLORIDE 2 MG/ML
1 INJECTION INTRAMUSCULAR; INTRAVENOUS; SUBCUTANEOUS
Refills: 0 | Status: DISCONTINUED | OUTPATIENT
Start: 2023-01-01 | End: 2023-01-01

## 2023-01-01 RX ADMIN — ROBINUL 0.2 MILLIGRAM(S): 0.2 INJECTION INTRAMUSCULAR; INTRAVENOUS at 02:13

## 2023-01-01 RX ADMIN — HYDROMORPHONE HYDROCHLORIDE 1 MILLIGRAM(S): 2 INJECTION INTRAMUSCULAR; INTRAVENOUS; SUBCUTANEOUS at 09:29

## 2023-01-01 RX ADMIN — HYDROMORPHONE HYDROCHLORIDE 1 MILLIGRAM(S): 2 INJECTION INTRAMUSCULAR; INTRAVENOUS; SUBCUTANEOUS at 23:27

## 2023-01-01 RX ADMIN — HYDROMORPHONE HYDROCHLORIDE 1 MILLIGRAM(S): 2 INJECTION INTRAMUSCULAR; INTRAVENOUS; SUBCUTANEOUS at 15:04

## 2023-01-01 RX ADMIN — HYDROMORPHONE HYDROCHLORIDE 1 MILLIGRAM(S): 2 INJECTION INTRAMUSCULAR; INTRAVENOUS; SUBCUTANEOUS at 20:39

## 2023-01-01 RX ADMIN — HYDROMORPHONE HYDROCHLORIDE 1 MILLIGRAM(S): 2 INJECTION INTRAMUSCULAR; INTRAVENOUS; SUBCUTANEOUS at 13:00

## 2023-01-01 RX ADMIN — HYDROMORPHONE HYDROCHLORIDE 1 MILLIGRAM(S): 2 INJECTION INTRAMUSCULAR; INTRAVENOUS; SUBCUTANEOUS at 17:55

## 2023-01-01 RX ADMIN — HYDROMORPHONE HYDROCHLORIDE 0.25 MILLIGRAM(S): 2 INJECTION INTRAMUSCULAR; INTRAVENOUS; SUBCUTANEOUS at 01:28

## 2023-01-01 RX ADMIN — HYDROMORPHONE HYDROCHLORIDE 1 MILLIGRAM(S): 2 INJECTION INTRAMUSCULAR; INTRAVENOUS; SUBCUTANEOUS at 01:36

## 2023-01-01 RX ADMIN — HYDROMORPHONE HYDROCHLORIDE 0.5 MILLIGRAM(S): 2 INJECTION INTRAMUSCULAR; INTRAVENOUS; SUBCUTANEOUS at 00:54

## 2023-01-01 RX ADMIN — HYDROMORPHONE HYDROCHLORIDE 1 MILLIGRAM(S): 2 INJECTION INTRAMUSCULAR; INTRAVENOUS; SUBCUTANEOUS at 03:48

## 2023-01-01 RX ADMIN — Medication 2.5 MILLIGRAM(S): at 19:47

## 2023-01-01 RX ADMIN — HYDROMORPHONE HYDROCHLORIDE 0.5 MILLIGRAM(S): 2 INJECTION INTRAMUSCULAR; INTRAVENOUS; SUBCUTANEOUS at 17:35

## 2023-01-01 RX ADMIN — HYDROMORPHONE HYDROCHLORIDE 0.5 MILLIGRAM(S): 2 INJECTION INTRAMUSCULAR; INTRAVENOUS; SUBCUTANEOUS at 21:12

## 2023-01-01 RX ADMIN — ROBINUL 0.2 MILLIGRAM(S): 0.2 INJECTION INTRAMUSCULAR; INTRAVENOUS at 09:29

## 2023-01-01 RX ADMIN — HYDROMORPHONE HYDROCHLORIDE 1 MILLIGRAM(S): 2 INJECTION INTRAMUSCULAR; INTRAVENOUS; SUBCUTANEOUS at 09:13

## 2023-01-01 RX ADMIN — HYDROMORPHONE HYDROCHLORIDE 1 MILLIGRAM(S): 2 INJECTION INTRAMUSCULAR; INTRAVENOUS; SUBCUTANEOUS at 06:03

## 2023-01-01 RX ADMIN — HYDROMORPHONE HYDROCHLORIDE 0.25 MILLIGRAM(S): 2 INJECTION INTRAMUSCULAR; INTRAVENOUS; SUBCUTANEOUS at 10:40

## 2023-01-01 RX ADMIN — BUDESONIDE AND FORMOTEROL FUMARATE DIHYDRATE 2 PUFF(S): 160; 4.5 AEROSOL RESPIRATORY (INHALATION) at 21:59

## 2023-01-01 RX ADMIN — HYDROMORPHONE HYDROCHLORIDE 0.5 MILLIGRAM(S): 2 INJECTION INTRAMUSCULAR; INTRAVENOUS; SUBCUTANEOUS at 07:14

## 2023-01-01 RX ADMIN — HYDROMORPHONE HYDROCHLORIDE 1 MILLIGRAM(S): 2 INJECTION INTRAMUSCULAR; INTRAVENOUS; SUBCUTANEOUS at 12:49

## 2023-01-01 RX ADMIN — HYDROMORPHONE HYDROCHLORIDE 1 MILLIGRAM(S): 2 INJECTION INTRAMUSCULAR; INTRAVENOUS; SUBCUTANEOUS at 15:42

## 2023-01-01 RX ADMIN — HYDROMORPHONE HYDROCHLORIDE 1 MILLIGRAM(S): 2 INJECTION INTRAMUSCULAR; INTRAVENOUS; SUBCUTANEOUS at 06:41

## 2023-01-01 RX ADMIN — HYDROMORPHONE HYDROCHLORIDE 0.5 MILLIGRAM(S): 2 INJECTION INTRAMUSCULAR; INTRAVENOUS; SUBCUTANEOUS at 10:37

## 2023-01-01 RX ADMIN — HYDROMORPHONE HYDROCHLORIDE 1 MILLIGRAM(S): 2 INJECTION INTRAMUSCULAR; INTRAVENOUS; SUBCUTANEOUS at 03:34

## 2023-01-01 RX ADMIN — ROBINUL 0.2 MILLIGRAM(S): 0.2 INJECTION INTRAMUSCULAR; INTRAVENOUS at 06:03

## 2023-01-01 RX ADMIN — Medication 0.25 MILLIGRAM(S): at 06:31

## 2023-01-01 RX ADMIN — ROBINUL 0.2 MILLIGRAM(S): 0.2 INJECTION INTRAMUSCULAR; INTRAVENOUS at 23:27

## 2023-01-01 RX ADMIN — HYDROMORPHONE HYDROCHLORIDE 1 MILLIGRAM(S): 2 INJECTION INTRAMUSCULAR; INTRAVENOUS; SUBCUTANEOUS at 00:34

## 2023-01-01 RX ADMIN — ROBINUL 0.2 MILLIGRAM(S): 0.2 INJECTION INTRAMUSCULAR; INTRAVENOUS at 13:00

## 2023-01-01 RX ADMIN — HYDROMORPHONE HYDROCHLORIDE 1 MILLIGRAM(S): 2 INJECTION INTRAMUSCULAR; INTRAVENOUS; SUBCUTANEOUS at 18:49

## 2023-01-01 RX ADMIN — CASPOFUNGIN ACETATE 260 MILLIGRAM(S): 7 INJECTION, POWDER, LYOPHILIZED, FOR SOLUTION INTRAVENOUS at 18:46

## 2023-01-01 RX ADMIN — HYDROMORPHONE HYDROCHLORIDE 0.5 MILLIGRAM(S): 2 INJECTION INTRAMUSCULAR; INTRAVENOUS; SUBCUTANEOUS at 04:00

## 2023-01-01 RX ADMIN — ROBINUL 0.2 MILLIGRAM(S): 0.2 INJECTION INTRAMUSCULAR; INTRAVENOUS at 18:50

## 2023-01-01 RX ADMIN — BUDESONIDE AND FORMOTEROL FUMARATE DIHYDRATE 2 PUFF(S): 160; 4.5 AEROSOL RESPIRATORY (INHALATION) at 08:18

## 2023-01-01 RX ADMIN — ROBINUL 0.2 MILLIGRAM(S): 0.2 INJECTION INTRAMUSCULAR; INTRAVENOUS at 17:55

## 2023-01-01 RX ADMIN — HYDROMORPHONE HYDROCHLORIDE 1 MILLIGRAM(S): 2 INJECTION INTRAMUSCULAR; INTRAVENOUS; SUBCUTANEOUS at 21:28

## 2023-01-01 NOTE — CHART NOTE - NSCHARTNOTEFT_GEN_A_CORE
notified by nurse for tachy at 140s.   Chart reviewed, pt on comfort care.   Metoprolol order still active, pt is still on remote tele. Will ask day team to clarify on the orders.

## 2023-01-01 NOTE — PROGRESS NOTE ADULT - SUBJECTIVE AND OBJECTIVE BOX
CC: CVA, MI, Atrial fib.,     HPI: 89 yo female with Hx. of CAD, COPD, CVA R inferior cerebellar infarct, HLD, HTN, cervical spondylosis, Hospitalized in  on 12/22 for weakness sec. to COVID 19 infection , was discharged to [facility] for CED on 12/22/22, was sent to the ER this am for slurred speech. The patient son states he did a face time video with her around 11 am and found her to have slurred speech and hoarse voice.  In the ER she was found with new onset Atrial fib with RVR , Left sided weakness,  was treated with IV Cardizem by ER MD. Low Bp treated with IV NS.  Troponin 4870,     INTERVAL HPI/ OVERNIGHT EVENTS:   Pt was  seen and examined, lethargic but erosible, no for pain. looks dyspneic       Vital Signs Last 24 Hrs  T(C): --  T(F): --  HR: --  BP: --  BP(mean): --  RR: --  SpO2: --            REVIEW OF SYSTEMS:  Unable to obtain       PHYSICAL EXAM:  General: Well developed; more comfortable,  dyspneic   Eyes:  EOMI; conjunctiva and sclera clear  Head: Normocephalic; atraumatic  ENMT: No nasal discharge;  oral mucosa with secretion and dark coverage on the  tongue,  dry lips, dry oral mucosa   Neck: Supple, + JVD   Respiratory: Diminished BS b/l,    rales  at bases   Cardiovascular: Irregular rate and rhythm. S1 and S2 Normal; + Murmur   Gastrointestinal: Soft non-tender non-distended; Normal bowel sounds  Genitourinary:  Sosa in place   Extremities: edema   worse   Vascular: Peripheral pulses palpable 2+ bilaterally  Neurological: Alert and oriented, unable to verbalize.   Skin: Warm and dry. Multiple  ecchymotic changes UE b/l.    Psychiatric: Cooperative and anxious     LABS:                         8.8    13.32 )-----------( 264      ( 31 Dec 2022 16:54 )             26.2     12-30    x   |  x   |  x   ----------------------------<  x   5.4<H>   |  x   |  x     Ca    9.5      30 Dec 2022 08:47  Phos  5.2     12-30    TPro  x   /  Alb  2.8<L>  /  TBili  x   /  DBili  x   /  AST  x   /  ALT  x   /  AlkPhos  x   12-30                            9.3    16.11 )-----------( 295      ( 30 Dec 2022 08:47 )             28.7     12-30    146<H>  |  122<H>  |  109<H>  ----------------------------<  135<H>  5.6<H>   |  15<L>  |  3.78<H>    Ca    9.5      30 Dec 2022 08:47  Phos  5.2     12-30    TPro  x   /  Alb  2.8<L>  /  TBili  x   /  DBili  x   /  AST  x   /  ALT  x   /  AlkPhos  x   12-30  LIVER FUNCTIONS - ( 30 Dec 2022 08:47 )  Alb: 2.8 g/dL / Pro: x     / ALK PHOS: x     / ALT: x     / AST: x     / GGT: x           PT/INR - ( 30 Dec 2022 08:47 )   PT: 13.4 sec;   INR: 1.15 ratio    PTT - ( 30 Dec 2022 08:47 )  PTT:23.4 sec                              9.0    11.21 )-----------( 253      ( 29 Dec 2022 08:47 )             26.4     12-29    143  |  118<H>  |  104<H>  ----------------------------<  154<H>  5.6<H>   |  17<L>  |  4.03<H>    Ca    9.4      29 Dec 2022 08:47                            7.1    10.60 )-----------( 252      ( 28 Dec 2022 07:47 )             22.3     12-28    144  |  117<H>  |  102<H>  ----------------------------<  153<H>  5.4<H>   |  16<L>  |  4.01<H>    Ca    9.0      28 Dec 2022 07:47    TPro  5.5<L>  /  Alb  2.2<L>  /  TBili  0.4  /  DBili  x   /  AST  16  /  ALT  22  /  AlkPhos  60  12-27      LIVER FUNCTIONS - ( 27 Dec 2022 08:33 )  Alb: 2.2 g/dL / Pro: 5.5 gm/dL / ALK PHOS: 60 U/L / ALT: 22 U/L / AST: 16 U/L / GGT: x         PT/INR - ( 27 Dec 2022 08:33 )   PT: 14.1 sec;   INR: 1.21 ratio      PTT - ( 27 Dec 2022 17:24 )  PTT:30.6 sec                          7.9    x     )-----------( x        ( 27 Dec 2022 14:03 )             24.4     27 Dec 2022 08:33    141    |  115    |  85     ----------------------------<  171    5.4     |  17     |  3.49     Ca    8.9        27 Dec 2022 08:33    TPro  5.5    /  Alb  2.2    /  TBili  0.4    /  DBili  x      /  AST  16     /  ALT  22     /  AlkPhos  60     27 Dec 2022 08:33    PT/INR - ( 27 Dec 2022 08:33 )   PT: 14.1 sec;   INR: 1.21 ratio         PTT - ( 27 Dec 2022 08:33 )  PTT:128.6 sec  CAPILLARY BLOOD GLUCOSE        LIVER FUNCTIONS - ( 27 Dec 2022 08:33 )  Alb: 2.2 g/dL / Pro: 5.5 gm/dL / ALK PHOS: 60 U/L / ALT: 22 U/L / AST: 16 U/L / GGT: x               MEDICATIONS  (STANDING):  aspirin  chewable 81 milliGRAM(s) Oral daily  atorvastatin 40 milliGRAM(s) Oral at bedtime  Biotene Dry Mouth Oral Rinse 15 milliLiter(s) Swish and Spit four times a day  budesonide  80 MICROgram(s)/formoterol 4.5 MICROgram(s) Inhaler 2 Puff(s) Inhalation two times a day  cefepime  Injectable. 1000 milliGRAM(s) IV Push every 24 hours  cholecalciferol 1000 Unit(s) Oral daily  dextrose 5% + sodium chloride 0.45%. 1000 milliLiter(s) (60 mL/Hr) IV Continuous <Continuous>  diltiazem Infusion 5 mG/Hr (5 mL/Hr) IV Continuous <Continuous>  dorzolamide 2% Ophthalmic Solution 1 Drop(s) Both EYES three times a day  FIRST- Mouthwash  BLM 15 milliLiter(s) Swish and Spit four times a day  heparin  Infusion.  Unit(s)/Hr (14 mL/Hr) IV Continuous <Continuous>  latanoprost 0.005% Ophthalmic Solution 1 Drop(s) Both EYES at bedtime  metoprolol tartrate 25 milliGRAM(s) Oral three times a day  nystatin    Suspension 887486 Unit(s) Oral four times a day  polyethylene glycol 3350 17 Gram(s) Oral daily    MEDICATIONS  (PRN):  acetaminophen     Tablet .. 650 milliGRAM(s) Oral every 6 hours PRN Temp greater or equal to 38C (100.4F), Mild Pain (1 - 3)  ALPRAZolam 0.25 milliGRAM(s) Oral two times a day PRN anxiety  heparin   Injectable 6500 Unit(s) IV Push every 6 hours PRN For aPTT less than 40  heparin   Injectable 3000 Unit(s) IV Push every 6 hours PRN For aPTT between 40 - 57        RADIOLOGY & ADDITIONAL TESTS:        ACC: [id number] EXAM:  US KIDNEYS AND BLADDER                        PROCEDURE DATE:  12/26/2022    FINDINGS:  Right kidney: 9.2 cm. Limited. No hydronephrosis.    Left kidney: 9.6 cm. Limited. No hydronephrosis.    Urinary bladder: Unremarkable.    IMPRESSION:    Limited study.    No hydronephrosis.            ACC: [id number] EXAM:  MR BRAIN                          *** ADDENDUM***    Please note area of abnormal T2 prolongation with restricted diffusion is   seen involving the high right posterior frontal cortical and subcortical   region not the left side as stated in the body of the report.    --- End of Report ---    *** END OF ADDENDUM***      PROCEDURE DATE:  12/25/2022          INTERPRETATION:  Clinical indication: Dysarthria. Left facial droop.    MRI of the brain was performed using sagittal T1 axial T1 T2 T2 FLAIR   diffusion and susceptibility weighted sequence    This exam is compared with prior brain MRI performed on January 14, 2015   and prior CT scan performed on December 24, 2022.    Extensive parenchymal volume loss and chronic microvascular ischemic   changes are identified.    There is evidence of a new vague area of T2 prolongation and restricted   diffusion seen involving the high left posterior frontal   cortical/subcortical region. This is best seen on series 5 image 51 and   does demonstrate decreased signal on the ADC mapping. This could be   compatible with a subacute infarct. No hemorrhagic transformation is   seen. No significant shift or herniation is seen.    Encephalomalacia and gliosis involving the inferiorright cerebellar   region is identified. This chronic appearing infarct has demonstrated   expected evolutionary changes when compared with the prior brain MRI.    There are no abnormal intra or extra-axial collections seen.    The large vessels demonstrate normal flow-voids.    Extensive mucosal thickening is seen involving both sphenoid sinus with   air-fluid levels involving the right sphenoid sinus. Bilateral ethmoid   sinus mucosal thickening is seen. The patient is status post bilateral   cataract surgery. Both mastoid and middle ear regions appear clear.    IMPRESSION: Subacute infarct suspected involving the high right posterior   frontal cortical/subcortical region.        < from: CT Chest No Cont (12.27.22 @ 16:41) >    ACC: [id number] EXAM:  CT ABDOMEN AND PELVIS                        ACC: [id number] EXAM:  CT CHEST                          PROCEDURE DATE:  12/27/2022          INTERPRETATION:  CLINICAL INFORMATION: Anemia    COMPARISON: CT chest 12/15/2021    CONTRAST/COMPLICATIONS:  IV Contrast: NONE  Oral Contrast: NONE  Complications: None reported at time of study completion    PROCEDURE:  CT of the Chest, Abdomen and Pelvis was performed.  Sagittal and coronal reformats were performed.    FINDINGS:  CHEST:  LUNGS AND LARGE AIRWAYS: Patent central airways. A 4 mm left upper lobe   nodule, new since prior study (2:12). Tree-in-bud nodules in bilateral   lower lobes. Patchy groundglass opacities in the bilateral upper lobes.  PLEURA: No pleural effusion.  VESSELS: Atherosclerotic changes of the aorta and coronary arteries.  HEART: Heart size is normal. No pericardial effusion.  MEDIASTINUM AND CRISTHIAN: No lymphadenopathy. Moderate hiatal hernia  CHEST WALL AND LOWER NECK: Heterogeneous thyroid gland..    ABDOMEN AND PELVIS: Motion degradation limits evaluation of the bowel.  LIVER: Within normal limits.  BILE DUCTS: Normal caliber.  GALLBLADDER: Vicarious excretion of contrast.  SPLEEN: Within normal limits.  PANCREAS: Within normal limits.  ADRENALS:Within normal limits.  KIDNEYS/URETERS: Persistent nephrograms. Exophytic hypodense right   interpolar lesion measures 1.7 cm, previously noted to be hyperdense.    BLADDER: Within normal limits.  REPRODUCTIVE ORGANS: A 2.2 cm right adnexal cyst. 1.4cm left adnexal   cyst. Calcified fibroid.    BOWEL: No bowel obstruction. Sigmoid diverticulosis. Normal appendix. The   bowel is poorly evaluated due to motion artifact.  PERITONEUM: No ascites.  VESSELS: Within normal limits.  RETROPERITONEUM/LYMPH NODES: Moderate presacral edema. No lymphadenopathy.  ABDOMINAL WALL: Within normal limits.  BONES: Degenerative changes.    IMPRESSION:  Bilateral lower lobe tree-in-bud nodules and patchy groundglass opacities   may represent atypical infection.    New 4 mm left upper lobe pulmonary nodule. Recommend attention on   follow-up.    Motion limited images of the abdomen. Grossly no bowel obstruction.    Persistent bilateral nephrograms. Patient received IV contrast 12/24/2022   for CTA Head and Neck. Recommend monitoring patient's serum creatinine   level to monitor for contrast-induced nephropathy.      < from: Xray Chest 1 View-PORTABLE IMMEDIATE (Xray Chest 1 View-PORTABLE IMMEDIATE .) (12.30.22 @ 13:38) >  ACC: [id number] EXAM:  XR CHEST PORTABLE IMMED 1V                          PROCEDURE DATE:  12/30/2022          INTERPRETATION:  Frontal chest on December 30, 2022 at 12:46 PM. Patient   is short of breath and has leukocytosis.    Heart is enlarged and obscures the left base. Calcified mitral area again   noted.    There is persistent slight atelectasis in the lingula.    Chest is similar to December 24.    IMPRESSION: Unchanged chest as above.

## 2023-01-01 NOTE — PROGRESS NOTE ADULT - ASSESSMENT
89 yo female with Hx. of CAD, COPD, CVA R inferior cerebellar infarct, HLD, HTN, cervical spondylosis admitted for:       # New Onset Atrial fibrillation with RVR  - off tele  In  AFIB, rate up tp 140-150s,   - comfort measures  -D/w Dr Gonzalez  , agrees  comfort care is appropriate       # Subacute CVA  - not a candidate for TPA on admission   - MRI brain: Subacute infarct suspected involving the high right posterior frontal cortical/subcortical region.  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 Oral Thrush   - magic mouth wash and nystatin  as tolerating  -   supportive care        # ACP:  Pts worsening condition and Goals discussed, poor prognosis.    DNR/DNI   Comfort MOLST form signed   C/w Dilaudid IV for dyspnea and pain, Ativan for agitation and anxiety   SW  referral to [facility]

## 2023-01-02 NOTE — PROGRESS NOTE ADULT - SUBJECTIVE AND OBJECTIVE BOX
CC: CVA, MI, Atrial fib.,     HPI: 89 yo female with Hx. of CAD, COPD, CVA R inferior cerebellar infarct, HLD, HTN, cervical spondylosis, Hospitalized in  on 12/22 for weakness sec. to COVID 19 infection , was discharged to [place] for CED on 12/22/22, was sent to the ER this am for slurred speech. The patient son states he did a face time video with her around 11 am and found her to have slurred speech and hoarse voice.  In the ER she was found with new onset Atrial fib with RVR , Left sided weakness,  was treated with IV Cardizem by ER MD. Low Bp treated with IV NS.  Troponin 4870,     INTERVAL HPI/ OVERNIGHT EVENTS:   Pt was  seen and examined, lethargic but erosible, no for pain. looks dyspneic       Vital Signs Last 24 Hrs  T(C): 36.4 (01 Jan 2023 15:35), Max: 36.4 (01 Jan 2023 15:35)  T(F): 97.6 (01 Jan 2023 15:35), Max: 97.6 (01 Jan 2023 15:35)  HR: 115 (02 Jan 2023 06:14) (68 - 115)  BP: 94/72 (02 Jan 2023 06:14) (94/72 - 161/102)  RR: 24 (01 Jan 2023 17:45) (21 - 24)  SpO2: 92% (01 Jan 2023 17:45) (92% - 97%)    Parameters below as of 01 Jan 2023 17:45  Patient On (Oxygen Delivery Method): nasal cannula  O2 Flow (L/min): 2          REVIEW OF SYSTEMS:  Unable to obtain       PHYSICAL EXAM:  General: Well developed; more comfortable,  dyspneic   Eyes:  EOMI; conjunctiva and sclera clear  Head: Normocephalic; atraumatic  ENMT: No nasal discharge;  oral mucosa with secretion and dark coverage on the  tongue,  dry lips, dry oral mucosa   Neck: Supple, + JVD   Respiratory: Diminished BS b/l,    rales  at bases   Cardiovascular: Irregular rate and rhythm. S1 and S2 Normal; + Murmur   Gastrointestinal: Soft non-tender non-distended; Normal bowel sounds  Genitourinary:  Sosa in place   Extremities: edema   worse   Vascular: Peripheral pulses palpable 2+ bilaterally  Neurological: Alert and oriented, unable to verbalize.   Skin: Warm and dry. Multiple  ecchymotic changes UE b/l.    Psychiatric: Cooperative and anxious     LABS:                         8.8    13.32 )-----------( 264      ( 31 Dec 2022 16:54 )             26.2     12-30    x   |  x   |  x   ----------------------------<  x   5.4<H>   |  x   |  x     Ca    9.5      30 Dec 2022 08:47  Phos  5.2     12-30    TPro  x   /  Alb  2.8<L>  /  TBili  x   /  DBili  x   /  AST  x   /  ALT  x   /  AlkPhos  x   12-30                            9.3    16.11 )-----------( 295      ( 30 Dec 2022 08:47 )             28.7     12-30    146<H>  |  122<H>  |  109<H>  ----------------------------<  135<H>  5.6<H>   |  15<L>  |  3.78<H>    Ca    9.5      30 Dec 2022 08:47  Phos  5.2     12-30    TPro  x   /  Alb  2.8<L>  /  TBili  x   /  DBili  x   /  AST  x   /  ALT  x   /  AlkPhos  x   12-30  LIVER FUNCTIONS - ( 30 Dec 2022 08:47 )  Alb: 2.8 g/dL / Pro: x     / ALK PHOS: x     / ALT: x     / AST: x     / GGT: x           PT/INR - ( 30 Dec 2022 08:47 )   PT: 13.4 sec;   INR: 1.15 ratio    PTT - ( 30 Dec 2022 08:47 )  PTT:23.4 sec                              9.0    11.21 )-----------( 253      ( 29 Dec 2022 08:47 )             26.4     12-29    143  |  118<H>  |  104<H>  ----------------------------<  154<H>  5.6<H>   |  17<L>  |  4.03<H>    Ca    9.4      29 Dec 2022 08:47                            7.1    10.60 )-----------( 252      ( 28 Dec 2022 07:47 )             22.3     12-28    144  |  117<H>  |  102<H>  ----------------------------<  153<H>  5.4<H>   |  16<L>  |  4.01<H>    Ca    9.0      28 Dec 2022 07:47    TPro  5.5<L>  /  Alb  2.2<L>  /  TBili  0.4  /  DBili  x   /  AST  16  /  ALT  22  /  AlkPhos  60  12-27      LIVER FUNCTIONS - ( 27 Dec 2022 08:33 )  Alb: 2.2 g/dL / Pro: 5.5 gm/dL / ALK PHOS: 60 U/L / ALT: 22 U/L / AST: 16 U/L / GGT: x         PT/INR - ( 27 Dec 2022 08:33 )   PT: 14.1 sec;   INR: 1.21 ratio      PTT - ( 27 Dec 2022 17:24 )  PTT:30.6 sec                          7.9    x     )-----------( x        ( 27 Dec 2022 14:03 )             24.4     27 Dec 2022 08:33    141    |  115    |  85     ----------------------------<  171    5.4     |  17     |  3.49     Ca    8.9        27 Dec 2022 08:33    TPro  5.5    /  Alb  2.2    /  TBili  0.4    /  DBili  x      /  AST  16     /  ALT  22     /  AlkPhos  60     27 Dec 2022 08:33    PT/INR - ( 27 Dec 2022 08:33 )   PT: 14.1 sec;   INR: 1.21 ratio         PTT - ( 27 Dec 2022 08:33 )  PTT:128.6 sec  CAPILLARY BLOOD GLUCOSE        LIVER FUNCTIONS - ( 27 Dec 2022 08:33 )  Alb: 2.2 g/dL / Pro: 5.5 gm/dL / ALK PHOS: 60 U/L / ALT: 22 U/L / AST: 16 U/L / GGT: x               MEDICATIONS  (STANDING):  aspirin  chewable 81 milliGRAM(s) Oral daily  atorvastatin 40 milliGRAM(s) Oral at bedtime  Biotene Dry Mouth Oral Rinse 15 milliLiter(s) Swish and Spit four times a day  budesonide  80 MICROgram(s)/formoterol 4.5 MICROgram(s) Inhaler 2 Puff(s) Inhalation two times a day  cefepime  Injectable. 1000 milliGRAM(s) IV Push every 24 hours  cholecalciferol 1000 Unit(s) Oral daily  dextrose 5% + sodium chloride 0.45%. 1000 milliLiter(s) (60 mL/Hr) IV Continuous <Continuous>  diltiazem Infusion 5 mG/Hr (5 mL/Hr) IV Continuous <Continuous>  dorzolamide 2% Ophthalmic Solution 1 Drop(s) Both EYES three times a day  FIRST- Mouthwash  BLM 15 milliLiter(s) Swish and Spit four times a day  heparin  Infusion.  Unit(s)/Hr (14 mL/Hr) IV Continuous <Continuous>  latanoprost 0.005% Ophthalmic Solution 1 Drop(s) Both EYES at bedtime  metoprolol tartrate 25 milliGRAM(s) Oral three times a day  nystatin    Suspension 185624 Unit(s) Oral four times a day  polyethylene glycol 3350 17 Gram(s) Oral daily    MEDICATIONS  (PRN):  acetaminophen     Tablet .. 650 milliGRAM(s) Oral every 6 hours PRN Temp greater or equal to 38C (100.4F), Mild Pain (1 - 3)  ALPRAZolam 0.25 milliGRAM(s) Oral two times a day PRN anxiety  heparin   Injectable 6500 Unit(s) IV Push every 6 hours PRN For aPTT less than 40  heparin   Injectable 3000 Unit(s) IV Push every 6 hours PRN For aPTT between 40 - 57        RADIOLOGY & ADDITIONAL TESTS:        ACC: [id number] EXAM:  US KIDNEYS AND BLADDER                        PROCEDURE DATE:  12/26/2022    FINDINGS:  Right kidney: 9.2 cm. Limited. No hydronephrosis.    Left kidney: 9.6 cm. Limited. No hydronephrosis.    Urinary bladder: Unremarkable.    IMPRESSION:    Limited study.    No hydronephrosis.            ACC: [id number] EXAM:  MR BRAIN                          *** ADDENDUM***    Please note area of abnormal T2 prolongation with restricted diffusion is   seen involving the high right posterior frontal cortical and subcortical   region not the left side as stated in the body of the report.    --- End of Report ---    *** END OF ADDENDUM***      PROCEDURE DATE:  12/25/2022          INTERPRETATION:  Clinical indication: Dysarthria. Left facial droop.    MRI of the brain was performed using sagittal T1 axial T1 T2 T2 FLAIR   diffusion and susceptibility weighted sequence    This exam is compared with prior brain MRI performed on January 14, 2015   and prior CT scan performed on December 24, 2022.    Extensive parenchymal volume loss and chronic microvascular ischemic   changes are identified.    There is evidence of a new vague area of T2 prolongation and restricted   diffusion seen involving the high left posterior frontal   cortical/subcortical region. This is best seen on series 5 image 51 and   does demonstrate decreased signal on the ADC mapping. This could be   compatible with a subacute infarct. No hemorrhagic transformation is   seen. No significant shift or herniation is seen.    Encephalomalacia and gliosis involving the inferiorright cerebellar   region is identified. This chronic appearing infarct has demonstrated   expected evolutionary changes when compared with the prior brain MRI.    There are no abnormal intra or extra-axial collections seen.    The large vessels demonstrate normal flow-voids.    Extensive mucosal thickening is seen involving both sphenoid sinus with   air-fluid levels involving the right sphenoid sinus. Bilateral ethmoid   sinus mucosal thickening is seen. The patient is status post bilateral   cataract surgery. Both mastoid and middle ear regions appear clear.    IMPRESSION: Subacute infarct suspected involving the high right posterior   frontal cortical/subcortical region.        < from: CT Chest No Cont (12.27.22 @ 16:41) >    ACC: [id number] EXAM:  CT ABDOMEN AND PELVIS                        ACC: [id number] EXAM:  CT CHEST                          PROCEDURE DATE:  12/27/2022          INTERPRETATION:  CLINICAL INFORMATION: Anemia    COMPARISON: CT chest 12/15/2021    CONTRAST/COMPLICATIONS:  IV Contrast: NONE  Oral Contrast: NONE  Complications: None reported at time of study completion    PROCEDURE:  CT of the Chest, Abdomen and Pelvis was performed.  Sagittal and coronal reformats were performed.    FINDINGS:  CHEST:  LUNGS AND LARGE AIRWAYS: Patent central airways. A 4 mm left upper lobe   nodule, new since prior study (2:12). Tree-in-bud nodules in bilateral   lower lobes. Patchy groundglass opacities in the bilateral upper lobes.  PLEURA: No pleural effusion.  VESSELS: Atherosclerotic changes of the aorta and coronary arteries.  HEART: Heart size is normal. No pericardial effusion.  MEDIASTINUM AND CRISTHIAN: No lymphadenopathy. Moderate hiatal hernia  CHEST WALL AND LOWER NECK: Heterogeneous thyroid gland..    ABDOMEN AND PELVIS: Motion degradation limits evaluation of the bowel.  LIVER: Within normal limits.  BILE DUCTS: Normal caliber.  GALLBLADDER: Vicarious excretion of contrast.  SPLEEN: Within normal limits.  PANCREAS: Within normal limits.  ADRENALS:Within normal limits.  KIDNEYS/URETERS: Persistent nephrograms. Exophytic hypodense right   interpolar lesion measures 1.7 cm, previously noted to be hyperdense.    BLADDER: Within normal limits.  REPRODUCTIVE ORGANS: A 2.2 cm right adnexal cyst. 1.4cm left adnexal   cyst. Calcified fibroid.    BOWEL: No bowel obstruction. Sigmoid diverticulosis. Normal appendix. The   bowel is poorly evaluated due to motion artifact.  PERITONEUM: No ascites.  VESSELS: Within normal limits.  RETROPERITONEUM/LYMPH NODES: Moderate presacral edema. No lymphadenopathy.  ABDOMINAL WALL: Within normal limits.  BONES: Degenerative changes.    IMPRESSION:  Bilateral lower lobe tree-in-bud nodules and patchy groundglass opacities   may represent atypical infection.    New 4 mm left upper lobe pulmonary nodule. Recommend attention on   follow-up.    Motion limited images of the abdomen. Grossly no bowel obstruction.    Persistent bilateral nephrograms. Patient received IV contrast 12/24/2022   for CTA Head and Neck. Recommend monitoring patient's serum creatinine   level to monitor for contrast-induced nephropathy.      < from: Xray Chest 1 View-PORTABLE IMMEDIATE (Xray Chest 1 View-PORTABLE IMMEDIATE .) (12.30.22 @ 13:38) >  ACC: [id number] EXAM:  XR CHEST PORTABLE IMMED 1V                          PROCEDURE DATE:  12/30/2022          INTERPRETATION:  Frontal chest on December 30, 2022 at 12:46 PM. Patient   is short of breath and has leukocytosis.    Heart is enlarged and obscures the left base. Calcified mitral area again   noted.    There is persistent slight atelectasis in the lingula.    Chest is similar to December 24.    IMPRESSION: Unchanged chest as above.       CC: CVA, MI, Atrial fib.,     HPI: 89 yo female with Hx. of CAD, COPD, CVA R inferior cerebellar infarct, HLD, HTN, cervical spondylosis, Hospitalized in  on 12/22 for weakness sec. to COVID 19 infection , was discharged to [place] for CED on 12/22/22, was sent to the ER this am for slurred speech. The patient son states he did a face time video with her around 11 am and found her to have slurred speech and hoarse voice.  In the ER she was found with new onset Atrial fib with RVR , Left sided weakness,  was treated with IV Cardizem by ER MD. Low Bp treated with IV NS.  Troponin 4870,     INTERVAL HPI/ OVERNIGHT EVENTS:   Pt was  seen and examined, lethargic  not responding to touch  or voice erosible to voice or touch.  Family  at bedside, reports Pt woke up earlier but confused.  Pt with episodes of apnea. Plan discussed, emotional support provided. We discussed that at this point Pt is unstable for transfer to in Hospice       Vital Signs Last 24 Hrs  T(C): 36.4 (01 Jan 2023 15:35), Max: 36.4 (01 Jan 2023 15:35)  T(F): 97.6 (01 Jan 2023 15:35), Max: 97.6 (01 Jan 2023 15:35)  HR: 115 (02 Jan 2023 06:14) (68 - 115)  BP: 94/72 (02 Jan 2023 06:14) (94/72 - 161/102)  RR: 24 (01 Jan 2023 17:45) (21 - 24)  SpO2: 92% (01 Jan 2023 17:45) (92% - 97%)    Parameters below as of 01 Jan 2023 17:45  Patient On (Oxygen Delivery Method): nasal cannula  O2 Flow (L/min): 2      REVIEW OF SYSTEMS:  Unable to obtain       PHYSICAL EXAM:  General: Well developed; more comfortable,  apneic episodes   Eyes:  EOMI; conjunctiva and sclera clear  Head: Normocephalic; atraumatic  ENMT: No nasal discharge;  oral mucosa with secretion and dark coverage on the  tongue  Neck: Supple, + JVD   Respiratory: Diminished BS b/l,    rales  at bases   Cardiovascular: Irregular rate and rhythm. S1 and S2 Normal; + Murmur   Gastrointestinal: Soft non-tender non-distended; Normal bowel sounds  Genitourinary:  Sosa in place   Extremities: edema   worse   Vascular: Peripheral pulses palpable 2+ bilaterally  Neurological: Alert and oriented, unable to verbalize.   Skin: Warm and dry. Multiple  ecchymotic changes UE b/l.    Psychiatric: Cooperative and anxious         LABS:                           8.8    13.32 )-----------( 264      ( 31 Dec 2022 16:54 )             26.2     12-30    x   |  x   |  x   ----------------------------<  x   5.4<H>   |  x   |  x     Ca    9.5      30 Dec 2022 08:47  Phos  5.2     12-30    TPro  x   /  Alb  2.8<L>  /  TBili  x   /  DBili  x   /  AST  x   /  ALT  x   /  AlkPhos  x   12-30                            9.3    16.11 )-----------( 295      ( 30 Dec 2022 08:47 )             28.7     12-30    146<H>  |  122<H>  |  109<H>  ----------------------------<  135<H>  5.6<H>   |  15<L>  |  3.78<H>    Ca    9.5      30 Dec 2022 08:47  Phos  5.2     12-30    TPro  x   /  Alb  2.8<L>  /  TBili  x   /  DBili  x   /  AST  x   /  ALT  x   /  AlkPhos  x   12-30  LIVER FUNCTIONS - ( 30 Dec 2022 08:47 )  Alb: 2.8 g/dL / Pro: x     / ALK PHOS: x     / ALT: x     / AST: x     / GGT: x           PT/INR - ( 30 Dec 2022 08:47 )   PT: 13.4 sec;   INR: 1.15 ratio    PTT - ( 30 Dec 2022 08:47 )  PTT:23.4 sec                              9.0    11.21 )-----------( 253      ( 29 Dec 2022 08:47 )             26.4     12-29    143  |  118<H>  |  104<H>  ----------------------------<  154<H>  5.6<H>   |  17<L>  |  4.03<H>    Ca    9.4      29 Dec 2022 08:47                            7.1    10.60 )-----------( 252      ( 28 Dec 2022 07:47 )             22.3     12-28    144  |  117<H>  |  102<H>  ----------------------------<  153<H>  5.4<H>   |  16<L>  |  4.01<H>    Ca    9.0      28 Dec 2022 07:47    TPro  5.5<L>  /  Alb  2.2<L>  /  TBili  0.4  /  DBili  x   /  AST  16  /  ALT  22  /  AlkPhos  60  12-27      LIVER FUNCTIONS - ( 27 Dec 2022 08:33 )  Alb: 2.2 g/dL / Pro: 5.5 gm/dL / ALK PHOS: 60 U/L / ALT: 22 U/L / AST: 16 U/L / GGT: x         PT/INR - ( 27 Dec 2022 08:33 )   PT: 14.1 sec;   INR: 1.21 ratio      PTT - ( 27 Dec 2022 17:24 )  PTT:30.6 sec                          7.9    x     )-----------( x        ( 27 Dec 2022 14:03 )             24.4     27 Dec 2022 08:33    141    |  115    |  85     ----------------------------<  171    5.4     |  17     |  3.49     Ca    8.9        27 Dec 2022 08:33    TPro  5.5    /  Alb  2.2    /  TBili  0.4    /  DBili  x      /  AST  16     /  ALT  22     /  AlkPhos  60     27 Dec 2022 08:33    PT/INR - ( 27 Dec 2022 08:33 )   PT: 14.1 sec;   INR: 1.21 ratio         PTT - ( 27 Dec 2022 08:33 )  PTT:128.6 sec  CAPILLARY BLOOD GLUCOSE        LIVER FUNCTIONS - ( 27 Dec 2022 08:33 )  Alb: 2.2 g/dL / Pro: 5.5 gm/dL / ALK PHOS: 60 U/L / ALT: 22 U/L / AST: 16 U/L / GGT: x               MEDICATIONS  (STANDING):  Biotene Dry Mouth Oral Rinse 15 milliLiter(s) Swish and Spit four times a day  budesonide  80 MICROgram(s)/formoterol 4.5 MICROgram(s) Inhaler 2 Puff(s) Inhalation two times a day  dextrose 5% + sodium chloride 0.45%. 1000 milliLiter(s) (60 mL/Hr) IV Continuous <Continuous>  FIRST- Mouthwash  BLM 15 milliLiter(s) Swish and Spit four times a day  metoprolol tartrate Injectable 2.5 milliGRAM(s) IV Push every 6 hours  nystatin    Suspension 757576 Unit(s) Oral four times a day    MEDICATIONS  (PRN):  acetaminophen  Suppository .. 650 milliGRAM(s) Rectal every 6 hours PRN Temp greater or equal to 38C (100.4F)  glycopyrrolate Injectable 0.2 milliGRAM(s) IV Push every 6 hours PRN secretions  HYDROmorphone  Injectable 1 milliGRAM(s) IV Push every 2 hours PRN pain or SOB  LORazepam   Injectable 0.25 milliGRAM(s) IV Push every 8 hours PRN Anxiety      RADIOLOGY & ADDITIONAL TESTS:      ACC: [id number] EXAM:  US KIDNEYS AND BLADDER                        PROCEDURE DATE:  12/26/2022    FINDINGS:  Right kidney: 9.2 cm. Limited. No hydronephrosis.    Left kidney: 9.6 cm. Limited. No hydronephrosis.    Urinary bladder: Unremarkable.    IMPRESSION:    Limited study.    No hydronephrosis.            ACC: [id number] EXAM:  MR BRAIN                          *** ADDENDUM***    Please note area of abnormal T2 prolongation with restricted diffusion is   seen involving the high right posterior frontal cortical and subcortical   region not the left side as stated in the body of the report.    --- End of Report ---    *** END OF ADDENDUM***      PROCEDURE DATE:  12/25/2022          INTERPRETATION:  Clinical indication: Dysarthria. Left facial droop.    MRI of the brain was performed using sagittal T1 axial T1 T2 T2 FLAIR   diffusion and susceptibility weighted sequence    This exam is compared with prior brain MRI performed on January 14, 2015   and prior CT scan performed on December 24, 2022.    Extensive parenchymal volume loss and chronic microvascular ischemic   changes are identified.    There is evidence of a new vague area of T2 prolongation and restricted   diffusion seen involving the high left posterior frontal   cortical/subcortical region. This is best seen on series 5 image 51 and   does demonstrate decreased signal on the ADC mapping. This could be   compatible with a subacute infarct. No hemorrhagic transformation is   seen. No significant shift or herniation is seen.    Encephalomalacia and gliosis involving the inferiorright cerebellar   region is identified. This chronic appearing infarct has demonstrated   expected evolutionary changes when compared with the prior brain MRI.    There are no abnormal intra or extra-axial collections seen.    The large vessels demonstrate normal flow-voids.    Extensive mucosal thickening is seen involving both sphenoid sinus with   air-fluid levels involving the right sphenoid sinus. Bilateral ethmoid   sinus mucosal thickening is seen. The patient is status post bilateral   cataract surgery. Both mastoid and middle ear regions appear clear.    IMPRESSION: Subacute infarct suspected involving the high right posterior   frontal cortical/subcortical region.        < from: CT Chest No Cont (12.27.22 @ 16:41) >    ACC: [id number] EXAM:  CT ABDOMEN AND PELVIS                        ACC: [id number] EXAM:  CT CHEST                          PROCEDURE DATE:  12/27/2022          INTERPRETATION:  CLINICAL INFORMATION: Anemia    COMPARISON: CT chest 12/15/2021    CONTRAST/COMPLICATIONS:  IV Contrast: NONE  Oral Contrast: NONE  Complications: None reported at time of study completion    PROCEDURE:  CT of the Chest, Abdomen and Pelvis was performed.  Sagittal and coronal reformats were performed.    FINDINGS:  CHEST:  LUNGS AND LARGE AIRWAYS: Patent central airways. A 4 mm left upper lobe   nodule, new since prior study (2:12). Tree-in-bud nodules in bilateral   lower lobes. Patchy groundglass opacities in the bilateral upper lobes.  PLEURA: No pleural effusion.  VESSELS: Atherosclerotic changes of the aorta and coronary arteries.  HEART: Heart size is normal. No pericardial effusion.  MEDIASTINUM AND CRISTHIAN: No lymphadenopathy. Moderate hiatal hernia  CHEST WALL AND LOWER NECK: Heterogeneous thyroid gland..    ABDOMEN AND PELVIS: Motion degradation limits evaluation of the bowel.  LIVER: Within normal limits.  BILE DUCTS: Normal caliber.  GALLBLADDER: Vicarious excretion of contrast.  SPLEEN: Within normal limits.  PANCREAS: Within normal limits.  ADRENALS:Within normal limits.  KIDNEYS/URETERS: Persistent nephrograms. Exophytic hypodense right   interpolar lesion measures 1.7 cm, previously noted to be hyperdense.    BLADDER: Within normal limits.  REPRODUCTIVE ORGANS: A 2.2 cm right adnexal cyst. 1.4cm left adnexal   cyst. Calcified fibroid.    BOWEL: No bowel obstruction. Sigmoid diverticulosis. Normal appendix. The   bowel is poorly evaluated due to motion artifact.  PERITONEUM: No ascites.  VESSELS: Within normal limits.  RETROPERITONEUM/LYMPH NODES: Moderate presacral edema. No lymphadenopathy.  ABDOMINAL WALL: Within normal limits.  BONES: Degenerative changes.    IMPRESSION:  Bilateral lower lobe tree-in-bud nodules and patchy groundglass opacities   may represent atypical infection.    New 4 mm left upper lobe pulmonary nodule. Recommend attention on   follow-up.    Motion limited images of the abdomen. Grossly no bowel obstruction.    Persistent bilateral nephrograms. Patient received IV contrast 12/24/2022   for CTA Head and Neck. Recommend monitoring patient's serum creatinine   level to monitor for contrast-induced nephropathy.      < from: Xray Chest 1 View-PORTABLE IMMEDIATE (Xray Chest 1 View-PORTABLE IMMEDIATE .) (12.30.22 @ 13:38) >  ACC: [id number] EXAM:  XR CHEST PORTABLE IMMED 1V                          PROCEDURE DATE:  12/30/2022          INTERPRETATION:  Frontal chest on December 30, 2022 at 12:46 PM. Patient   is short of breath and has leukocytosis.    Heart is enlarged and obscures the left base. Calcified mitral area again   noted.    There is persistent slight atelectasis in the lingula.    Chest is similar to December 24.    IMPRESSION: Unchanged chest as above.

## 2023-01-02 NOTE — PROGRESS NOTE ADULT - ASSESSMENT
89 yo female with Hx. of CAD, COPD, CVA R inferior cerebellar infarct, HLD, HTN, cervical spondylosis admitted for:       # New Onset Atrial fibrillation with RVR  - off tele  In  AFIB, rate up tp 140-150s,   - comfort measures  -D/w Dr Gonzalez  , agrees  comfort care is appropriate       # Subacute CVA  - not a candidate for TPA on admission   - MRI brain: Subacute infarct suspected involving the high right posterior frontal cortical/subcortical region.  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 Oral Thrush   - magic mouth wash and nystatin  as tolerating  -   supportive care        # ACP:  Pts worsening condition and Goals discussed, poor prognosis.    DNR/DNI   Comfort MOLST form signed   C/w Dilaudid IV for dyspnea and pain, Ativan for agitation and anxiety   SW  referral to inPt Hospice  89 yo female with Hx. of CAD, COPD, CVA R inferior cerebellar infarct, HLD, HTN, cervical spondylosis admitted for:       # New Onset Atrial fibrillation with RVR  - off tele  In  AFIB, rate up tp 140-150s,   - comfort measures  -D/w Dr Gonzalez  , agrees  comfort care is appropriate       # Subacute CVA  - not a candidate for TPA on admission   - MRI brain: Subacute infarct suspected involving the high right posterior frontal cortical/subcortical region.  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 Oral Thrush   - magic mouth wash and nystatin  as tolerating  -   supportive care        # ACP:  Pts declining, with apneic episodes, prognosis is grieving, unstable for transfer to inPt hospice     DNR/DNI   Comfort MOLST form signed   C/w Dilaudid IV for dyspnea and pain, Ativan for agitation and anxiety       D/w RN and Pts family

## 2023-01-03 NOTE — PROGRESS NOTE ADULT - ASSESSMENT
89 yo female with Hx. of CAD, COPD, CVA R inferior cerebellar infarct, HLD, HTN, cervical spondylosis admitted for:       # New Onset Atrial fibrillation with RVR  - off tele  In  AFIB, rate up tp 140-150s,   - comfort measures  -D/w Dr Gonzalez  , agrees  comfort care is appropriate       # Subacute CVA  - not a candidate for TPA on admission   - MRI brain: Subacute infarct suspected involving the high right posterior frontal cortical/subcortical region.  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 Oral Thrush   - magic mouth wash and nystatin  as tolerating  -   supportive care      # ACP:  Pts declining, with apneic episodes, prognosis is grieving, unstable for transfer to inPt hospice     DNR/DNI   Comfort MOLST form signed   C/w Dilaudid IV for dyspnea and pain, Ativan for agitation and anxiety

## 2023-01-03 NOTE — PROGRESS NOTE ADULT - ASSESSMENT
Comfort Measures    Dilaudid 1mg IV [id number] prn for sob/pain  Glycopyralate 0.2 mg IV q 6 hrs PRN for congestions  Artificial tears Q6 hrs  Biotene mouth spray Q6 hrs  Tylenol Q 6 hrs prn for fever  Dulcolax q 72 hrs PRN if no bowel movements        Symptom Management:  - pain: opioids PRN  - secretions:  glycopyrrolate  Q6h PRN  - dyspnea: O2, PRN opioids  - fever: tylenol PRN  - eye care: biotene, artificial tears   - diet/anorexia: soft or pureed diet as tolerated  - urinary care: rowley  - bowel regimen: dulcolax PRN    Psychological & Psychiatric Aspects of Care:  - confusion/agitiation: ativan PRN  - medical and SW providing ongoing psychosocial support to family    Spiritual, Quaker, & Existential Aspects of Care:  - pastoral services available as needed    Cultural Aspects of Care:  - with this family there is no language barrier  - the following needs have been identified and addressed: none    Care of the Imminently Dying Patient:  - PPS: 10%  - Prognosis: could be anytime, but given disease progression prognosis is hours to days    Ethical & Legal Aspects of Care:  - the following ethical/legal concerns have been identified: none  - pt continues to need inpatient care for palliative extubation, symptom management and daily medication adjustment.     pts son bedside, has no questions at this time  hospice /comfort care     Time spent 45minutes including the care, coordination and counseling of this patient, 50% of which was spent coordinating and counseling.    Comfort Measures    Dilaudid 1mg IV [id number] prn for sob/pain  Glycopyralate 0.2 mg IV q 6 hrs PRN for congestions  Artificial tears Q6 hrs  Biotene mouth spray Q6 hrs  Tylenol Q 6 hrs prn for fever  Dulcolax q 72 hrs PRN if no bowel movements        Symptom Management:  - pain: opioids PRN  - secretions:  glycopyrrolate  Q6h PRN  - dyspnea: O2, PRN opioids  - fever: tylenol PRN  - eye care: biotene, artificial tears   - diet/anorexia: soft or pureed diet as tolerated  - urinary care: rowley  - bowel regimen: dulcolax PRN    Psychological & Psychiatric Aspects of Care:  - confusion/agitiation: ativan PRN  - medical and SW providing ongoing psychosocial support to family    Spiritual, Taoist, & Existential Aspects of Care:  - pastoral services available as needed    Cultural Aspects of Care:  - with this family there is no language barrier  - the following needs have been identified and addressed: none    Care of the Imminently Dying Patient:  - PPS: 10%  - Prognosis: could be anytime, but given disease progression prognosis is hours to days    Ethical & Legal Aspects of Care:  - the following ethical/legal concerns have been identified: none  - pt continues to need inpatient care for palliative extubation, symptom management and daily medication adjustment.     pts son bedside, has no questions at this time  hospice /comfort care     Time spent 55minutes including the care, review of records/emr, coordination and counseling of this patient, 50% of which was spent coordinating and counseling.

## 2023-01-03 NOTE — PROGRESS NOTE ADULT - SUBJECTIVE AND OBJECTIVE BOX
HPI:    1/3/23  pt seen and examined  obtunded  rr 7    as per Seun this was down from 10 yesterday  she was not in notable pain or dyspnea  Seun stated he didnt have anyqeuestions at that time       PAIN: ( )Yes   ( )No  Pt unable to characterise d/t clinical status     DYSPNEA: ( ss) Yes    ) No  Level: but comfortable and responds to IV Dilaudid         Review of Systems:     Unable to obtain/Limited due to: ams        PHYSICAL EXAM:    Vital Signs Last 24 Hrs  T(C): --  T(F): --  HR: --  BP: --  BP(mean): --  RR: --  SpO2: --      Daily     Daily     PPSV2: 10  %  FAST:    General:   NAD  Mental Status: obtunded  HEENT:  , perrl  Lungs: ctabl b/l bs  Cardiac: s1s2 no mgr  GI: soft nontender +BS   Ext: no swelling  Neuro: obtunded      LABS:            Albumin: Albumin, Serum: 2.8 g/dL (12-30 @ 08:47)      Allergies    No Known Allergies    Intolerances      MEDICATIONS  (STANDING):  Biotene Dry Mouth Oral Rinse 15 milliLiter(s) Swish and Spit four times a day  dextrose 5% + sodium chloride 0.45%. 1000 milliLiter(s) (60 mL/Hr) IV Continuous <Continuous>  FIRST- Mouthwash  BLM 15 milliLiter(s) Swish and Spit four times a day  metoprolol tartrate Injectable 2.5 milliGRAM(s) IV Push every 6 hours  nystatin    Suspension 028806 Unit(s) Oral four times a day    MEDICATIONS  (PRN):  acetaminophen  Suppository .. 650 milliGRAM(s) Rectal every 6 hours PRN Temp greater or equal to 38C (100.4F)  glycopyrrolate Injectable 0.2 milliGRAM(s) IV Push every 6 hours PRN secretions  HYDROmorphone  Injectable 1 milliGRAM(s) IV Push every 2 hours PRN pain or SOB  LORazepam   Injectable 0.25 milliGRAM(s) IV Push every 8 hours PRN Anxiety      RADIOLOGY:   HPI:    1/3/23  pt seen and examined  obtunded  rr 7    as per Seun this was down from 10 yesterday  she was not in notable pain or dyspnea  Seun stated he didnt have anyqeuestions at that time       PAIN: ( )Yes   ( )No  Pt unable to characterise d/t clinical status     DYSPNEA: ( ss) Yes    ) No  Level: but comfortable and responds to IV Dilaudid         Review of Systems:     Unable to obtain/Limited due to: ams        PHYSICAL EXAM:    Vital Signs Last 24 Hrs  T(C): --  T(F): --  HR: --  BP: --  BP(mean): --  RR: --  SpO2: --      Daily     Daily     PPSV2: 10  %  FAST:    General:   NAD  Mental Status: obtunded  HEENT:  AT, NC  Lungs: decreased rr   Cardiac: rr  GI: nondistended  Ext: no swelling  Neuro: obtunded      LABS:            Albumin: Albumin, Serum: 2.8 g/dL (12-30 @ 08:47)      Allergies    No Known Allergies    Intolerances      MEDICATIONS  (STANDING):  Biotene Dry Mouth Oral Rinse 15 milliLiter(s) Swish and Spit four times a day  dextrose 5% + sodium chloride 0.45%. 1000 milliLiter(s) (60 mL/Hr) IV Continuous <Continuous>  FIRST- Mouthwash  BLM 15 milliLiter(s) Swish and Spit four times a day  metoprolol tartrate Injectable 2.5 milliGRAM(s) IV Push every 6 hours  nystatin    Suspension 401729 Unit(s) Oral four times a day    MEDICATIONS  (PRN):  acetaminophen  Suppository .. 650 milliGRAM(s) Rectal every 6 hours PRN Temp greater or equal to 38C (100.4F)  glycopyrrolate Injectable 0.2 milliGRAM(s) IV Push every 6 hours PRN secretions  HYDROmorphone  Injectable 1 milliGRAM(s) IV Push every 2 hours PRN pain or SOB  LORazepam   Injectable 0.25 milliGRAM(s) IV Push every 8 hours PRN Anxiety      RADIOLOGY:

## 2023-01-03 NOTE — PROGRESS NOTE ADULT - SUBJECTIVE AND OBJECTIVE BOX
CC: CVA, MI, Atrial fib.,     HPI: 89 yo female with Hx. of CAD, COPD, CVA R inferior cerebellar infarct, HLD, HTN, cervical spondylosis, Hospitalized in  on 12/22 for weakness sec. to COVID 19 infection , was discharged to [facility] for CED on 12/22/22, was sent to the ER this am for slurred speech. The patient son states he did a face time video with her around 11 am and found her to have slurred speech and hoarse voice.  In the ER she was found with new onset Atrial fib with RVR , Left sided weakness,  was treated with IV Cardizem by ER MD. Low Bp treated with IV NS.  Troponin 4870,     INTERVAL HPI/ OVERNIGHT EVENTS: No change in clinical status, remains comfort focused care. Updated family at bedside      Vital Signs Last 24 Hrs  T(C): --  T(F): --  HR: --  BP: --  BP(mean): --  RR: --  SpO2: --    REVIEW OF SYSTEMS:  Unable to obtain     PHYSICAL EXAM:  General: Well developed; more comfortable,  apneic episodes   Eyes:  EOMI; conjunctiva and sclera clear  Head: Normocephalic; atraumatic  ENMT: No nasal discharge;  oral mucosa with secretion and dark coverage on the  tongue  Neck: Supple, + JVD   Respiratory: Diminished BS b/l,  rales at bases   Cardiovascular: Irregular rate and rhythm. S1 and S2 Normal; + Murmur   Gastrointestinal: Soft non-tender non-distended; Normal bowel sounds  Genitourinary:  Sosa in place   Extremities: edematous   Vascular: Peripheral pulses palpable 2+ bilaterally  Neurological: Alert, unable to verbalize.   Skin: Warm and dry. Multiple ecchymotic changes UE b/l.            LABS:                           8.8    13.32 )-----------( 264      ( 31 Dec 2022 16:54 )             26.2     12-30    x   |  x   |  x   ----------------------------<  x   5.4<H>   |  x   |  x     Ca    9.5      30 Dec 2022 08:47  Phos  5.2     12-30    TPro  x   /  Alb  2.8<L>  /  TBili  x   /  DBili  x   /  AST  x   /  ALT  x   /  AlkPhos  x   12-30                            9.3    16.11 )-----------( 295      ( 30 Dec 2022 08:47 )             28.7     12-30    146<H>  |  122<H>  |  109<H>  ----------------------------<  135<H>  5.6<H>   |  15<L>  |  3.78<H>    Ca    9.5      30 Dec 2022 08:47  Phos  5.2     12-30    TPro  x   /  Alb  2.8<L>  /  TBili  x   /  DBili  x   /  AST  x   /  ALT  x   /  AlkPhos  x   12-30  LIVER FUNCTIONS - ( 30 Dec 2022 08:47 )  Alb: 2.8 g/dL / Pro: x     / ALK PHOS: x     / ALT: x     / AST: x     / GGT: x           PT/INR - ( 30 Dec 2022 08:47 )   PT: 13.4 sec;   INR: 1.15 ratio    PTT - ( 30 Dec 2022 08:47 )  PTT:23.4 sec                              9.0    11.21 )-----------( 253      ( 29 Dec 2022 08:47 )             26.4     12-29    143  |  118<H>  |  104<H>  ----------------------------<  154<H>  5.6<H>   |  17<L>  |  4.03<H>    Ca    9.4      29 Dec 2022 08:47                            7.1    10.60 )-----------( 252      ( 28 Dec 2022 07:47 )             22.3     12-28    144  |  117<H>  |  102<H>  ----------------------------<  153<H>  5.4<H>   |  16<L>  |  4.01<H>    Ca    9.0      28 Dec 2022 07:47    TPro  5.5<L>  /  Alb  2.2<L>  /  TBili  0.4  /  DBili  x   /  AST  16  /  ALT  22  /  AlkPhos  60  12-27      LIVER FUNCTIONS - ( 27 Dec 2022 08:33 )  Alb: 2.2 g/dL / Pro: 5.5 gm/dL / ALK PHOS: 60 U/L / ALT: 22 U/L / AST: 16 U/L / GGT: x         PT/INR - ( 27 Dec 2022 08:33 )   PT: 14.1 sec;   INR: 1.21 ratio      PTT - ( 27 Dec 2022 17:24 )  PTT:30.6 sec                          7.9    x     )-----------( x        ( 27 Dec 2022 14:03 )             24.4     27 Dec 2022 08:33    141    |  115    |  85     ----------------------------<  171    5.4     |  17     |  3.49     Ca    8.9        27 Dec 2022 08:33    TPro  5.5    /  Alb  2.2    /  TBili  0.4    /  DBili  x      /  AST  16     /  ALT  22     /  AlkPhos  60     27 Dec 2022 08:33    PT/INR - ( 27 Dec 2022 08:33 )   PT: 14.1 sec;   INR: 1.21 ratio         PTT - ( 27 Dec 2022 08:33 )  PTT:128.6 sec  CAPILLARY BLOOD GLUCOSE        LIVER FUNCTIONS - ( 27 Dec 2022 08:33 )  Alb: 2.2 g/dL / Pro: 5.5 gm/dL / ALK PHOS: 60 U/L / ALT: 22 U/L / AST: 16 U/L / GGT: x               MEDICATIONS  (STANDING):  Biotene Dry Mouth Oral Rinse 15 milliLiter(s) Swish and Spit four times a day  budesonide  80 MICROgram(s)/formoterol 4.5 MICROgram(s) Inhaler 2 Puff(s) Inhalation two times a day  dextrose 5% + sodium chloride 0.45%. 1000 milliLiter(s) (60 mL/Hr) IV Continuous <Continuous>  FIRST- Mouthwash  BLM 15 milliLiter(s) Swish and Spit four times a day  metoprolol tartrate Injectable 2.5 milliGRAM(s) IV Push every 6 hours  nystatin    Suspension [id number] Unit(s) Oral four times a day    MEDICATIONS  (PRN):  acetaminophen  Suppository .. 650 milliGRAM(s) Rectal every 6 hours PRN Temp greater or equal to 38C (100.4F)  glycopyrrolate Injectable 0.2 milliGRAM(s) IV Push every 6 hours PRN secretions  HYDROmorphone  Injectable 1 milliGRAM(s) IV Push every 2 hours PRN pain or SOB  LORazepam   Injectable 0.25 milliGRAM(s) IV Push every 8 hours PRN Anxiety      RADIOLOGY & ADDITIONAL TESTS:      ACC: [id number] EXAM:  US KIDNEYS AND BLADDER                        PROCEDURE DATE:  12/26/2022    FINDINGS:  Right kidney: 9.2 cm. Limited. No hydronephrosis.    Left kidney: 9.6 cm. Limited. No hydronephrosis.    Urinary bladder: Unremarkable.    IMPRESSION:    Limited study.    No hydronephrosis.            ACC: [id number] EXAM:  MR BRAIN                          *** ADDENDUM***    Please note area of abnormal T2 prolongation with restricted diffusion is   seen involving the high right posterior frontal cortical and subcortical   region not the left side as stated in the body of the report.    --- End of Report ---    *** END OF ADDENDUM***      PROCEDURE DATE:  12/25/2022          INTERPRETATION:  Clinical indication: Dysarthria. Left facial droop.    MRI of the brain was performed using sagittal T1 axial T1 T2 T2 FLAIR   diffusion and susceptibility weighted sequence    This exam is compared with prior brain MRI performed on January 14, 2015   and prior CT scan performed on December 24, 2022.    Extensive parenchymal volume loss and chronic microvascular ischemic   changes are identified.    There is evidence of a new vague area of T2 prolongation and restricted   diffusion seen involving the high left posterior frontal   cortical/subcortical region. This is best seen on series 5 image 51 and   does demonstrate decreased signal on the ADC mapping. This could be   compatible with a subacute infarct. No hemorrhagic transformation is   seen. No significant shift or herniation is seen.    Encephalomalacia and gliosis involving the inferiorright cerebellar   region is identified. This chronic appearing infarct has demonstrated   expected evolutionary changes when compared with the prior brain MRI.    There are no abnormal intra or extra-axial collections seen.    The large vessels demonstrate normal flow-voids.    Extensive mucosal thickening is seen involving both sphenoid sinus with   air-fluid levels involving the right sphenoid sinus. Bilateral ethmoid   sinus mucosal thickening is seen. The patient is status post bilateral   cataract surgery. Both mastoid and middle ear regions appear clear.    IMPRESSION: Subacute infarct suspected involving the high right posterior   frontal cortical/subcortical region.        < from: CT Chest No Cont (12.27.22 @ 16:41) >    ACC: [id number] EXAM:  CT ABDOMEN AND PELVIS                        ACC: [id number] EXAM:  CT CHEST                          PROCEDURE DATE:  12/27/2022          INTERPRETATION:  CLINICAL INFORMATION: Anemia    COMPARISON: CT chest 12/15/2021    CONTRAST/COMPLICATIONS:  IV Contrast: NONE  Oral Contrast: NONE  Complications: None reported at time of study completion    PROCEDURE:  CT of the Chest, Abdomen and Pelvis was performed.  Sagittal and coronal reformats were performed.    FINDINGS:  CHEST:  LUNGS AND LARGE AIRWAYS: Patent central airways. A 4 mm left upper lobe   nodule, new since prior study (2:12). Tree-in-bud nodules in bilateral   lower lobes. Patchy groundglass opacities in the bilateral upper lobes.  PLEURA: No pleural effusion.  VESSELS: Atherosclerotic changes of the aorta and coronary arteries.  HEART: Heart size is normal. No pericardial effusion.  MEDIASTINUM AND CRISTHIAN: No lymphadenopathy. Moderate hiatal hernia  CHEST WALL AND LOWER NECK: Heterogeneous thyroid gland..    ABDOMEN AND PELVIS: Motion degradation limits evaluation of the bowel.  LIVER: Within normal limits.  BILE DUCTS: Normal caliber.  GALLBLADDER: Vicarious excretion of contrast.  SPLEEN: Within normal limits.  PANCREAS: Within normal limits.  ADRENALS:Within normal limits.  KIDNEYS/URETERS: Persistent nephrograms. Exophytic hypodense right   interpolar lesion measures 1.7 cm, previously noted to be hyperdense.    BLADDER: Within normal limits.  REPRODUCTIVE ORGANS: A 2.2 cm right adnexal cyst. 1.4cm left adnexal   cyst. Calcified fibroid.    BOWEL: No bowel obstruction. Sigmoid diverticulosis. Normal appendix. The   bowel is poorly evaluated due to motion artifact.  PERITONEUM: No ascites.  VESSELS: Within normal limits.  RETROPERITONEUM/LYMPH NODES: Moderate presacral edema. No lymphadenopathy.  ABDOMINAL WALL: Within normal limits.  BONES: Degenerative changes.    IMPRESSION:  Bilateral lower lobe tree-in-bud nodules and patchy groundglass opacities   may represent atypical infection.    New 4 mm left upper lobe pulmonary nodule. Recommend attention on   follow-up.    Motion limited images of the abdomen. Grossly no bowel obstruction.    Persistent bilateral nephrograms. Patient received IV contrast 12/24/2022   for CTA Head and Neck. Recommend monitoring patient's serum creatinine   level to monitor for contrast-induced nephropathy.      < from: Xray Chest 1 View-PORTABLE IMMEDIATE (Xray Chest 1 View-PORTABLE IMMEDIATE .) (12.30.22 @ 13:38) >  ACC: [id number] EXAM:  XR CHEST PORTABLE IMMED 1V                          PROCEDURE DATE:  12/30/2022          INTERPRETATION:  Frontal chest on December 30, 2022 at 12:46 PM. Patient   is short of breath and has leukocytosis.    Heart is enlarged and obscures the left base. Calcified mitral area again   noted.    There is persistent slight atelectasis in the lingula.    Chest is similar to December 24.    IMPRESSION: Unchanged chest as above.

## 2023-01-04 NOTE — DISCHARGE NOTE FOR THE EXPIRED PATIENT - HOSPITAL COURSE
HPI: 89 yo female with Hx. of CAD, COPD, CVA R inferior cerebellar infarct, HLD, HTN, cervical spondylosis, Hospitalized in  on  for weakness sec. to COVID 19 infection , was discharged to [place] for [facility] on 22, was sent to the ER this am for slurred speech. The patient son states he did a face time video with her around 11 am and found her to have slurred speech and hoarse voice.  In the ER she was found with new onset Atrial fib with RVR , Left sided weakness,  was treated with IV Cardizem by ER MD. Low Bp treated with IV NS.  Troponin 4870. Admitted for:    #New Onset Atrial fibrillation with RVR  - initially treated with cardizem drip, changed to PO metoprolol with improvement   - s/p heparin gtt, d/c'd due to drop in hgb   - off tele In AFIB, rate up tp 140-150s,   - comfort measures  - D/w Dr Gonzalez  , agrees  comfort care is appropriate     # Subacute CVA  - not a candidate for TPA on admission   - MRI brain: Subacute infarct suspected involving the high right posterior frontal cortical/subcortical region.  - Speech pathology, Pt with significant dysphagia due to stroke    #Elevated troponin,  NSTEMI type II on admission   in settings of acute infection and  AFIB   ECHO:  +2-3 MS, EF 65-70%, No RWMA, + LVH, severe AS  D/w Cardio, conservative management     # Acute Hypoxic respiratory failure due to COVID 19 PNA vs. 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 Oral Thrush   - magic mouth wash and nystatin    -  supportive care     # ACP:  Pts declining, with apneic episodes, prognosis is grieving, unstable for transfer to in hospice     DNR/DNI   Comfort MOLST form signed   Pt subsequently  on 23 @ 10:18 AM, son at bedside notified/aware - emotional support provided HPI: 89 yo female with Hx. of CAD, COPD, CVA R inferior cerebellar infarct, HLD, HTN, cervical spondylosis, Hospitalized in  on  for weakness sec. to COVID 19 infection , was discharged to [place] for [facility] on 22, was sent to the ER this am for slurred speech. The patient son states he did a face time video with her around 11 am and found her to have slurred speech and hoarse voice.  In the ER she was found with new onset Atrial fib with RVR , Left sided weakness,  was treated with IV Cardizem by ER MD. Low Bp treated with IV NS.  Troponin 4870. Admitted for:    #New Onset Atrial fibrillation with RVR  - initially treated with cardizem drip, changed to PO metoprolol with improvement   - s/p heparin gtt, d/c'd due to drop in hgb   - off tele In AFIB, rate up tp 140-150s,   - comfort measures  - D/w Dr Gonzalez  , agrees  comfort care is appropriate     # Subacute CVA  - not a candidate for TPA on admission   - MRI brain: Subacute infarct suspected involving the high right posterior frontal cortical/subcortical region.  - Speech pathology, Pt with significant dysphagia due to stroke    #Elevated troponin,  NSTEMI type II on admission   in settings of acute infection and  AFIB   ECHO:  +2-3 MS, EF 65-70%, No RWMA, + LVH, severe AS  D/w Cardio, conservative management     # Acute Hypoxic respiratory failure due to COVID 19 PNA vs. 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 Oral Thrush   - magic mouth wash and nystatin    -  supportive care     # ACP:  Pts declining, with apneic episodes, prognosis is grieving, unstable for transfer to in hospice     DNR/DNI   Comfort MOLST form signed   Pt subsequently  on 23 @ 10:18 AM, son at bedside notified/aware - emotional support provided    Attending brina. I saw and evaluated the patient and agree with above assessment and findings  Patient on comfort care with more shallow breaths. Today pulses absent, no spontenous respirations, no identifiable pulses. pupils fixed and dilated. patient pronounced  @ 1018AM on 2023  Discharge Management 44 minutes  Date of Discharge/Expiration: 2023

## 2023-01-04 NOTE — PROGRESS NOTE ADULT - PROVIDER SPECIALTY LIST ADULT
Hospitalist
Infectious Disease
Nephrology
Hospitalist
Infectious Disease
Nephrology
Nephrology
Palliative Care
Hospitalist
Nephrology
Neurology
Palliative Care
Palliative Care
Cardiology

## 2023-01-04 NOTE — PROGRESS NOTE ADULT - REASON FOR ADMISSION
CVA, MI, Atrial fib.,

## 2023-01-04 NOTE — PROGRESS NOTE ADULT - ASSESSMENT
Comfort Measures    Dilaudid 1mg IV [id number] prn for sob/pain  discussion to increase to 1.5mg if pts breathing did not improve- and was ordered but never needed to be dosed as pt transtioned and   Glycopyralate 0.2 mg IV q 6 hrs PRN for congestions  Artificial tears Q6 hrs  Biotene mouth spray Q6 hrs  Tylenol Q 6 hrs prn for fever  Dulcolax q 72 hrs PRN if no bowel movements        Symptom Management:  - pain: opioids PRN  - secretions:  glycopyrrolate  Q6h PRN  - dyspnea: O2, PRN opioids  - fever: tylenol PRN  - eye care: biotene, artificial tears   - diet/anorexia: soft or pureed diet as tolerated  - urinary care: rowley  - bowel regimen: dulcolax PRN    Psychological & Psychiatric Aspects of Care:  - confusion/agitiation: ativan PRN  - medical and SW providing ongoing psychosocial support to family    Spiritual, Temple, & Existential Aspects of Care:  - pastoral services available as needed    Cultural Aspects of Care:  - with this family there is no language barrier  - the following needs have been identified and addressed: none    Care of the Imminently Dying Patient:  - PPS: 10%  - Prognosis: could be anytime, but given disease progression prognosis is hours to days    Ethical & Legal Aspects of Care:  - the following ethical/legal concerns have been identified: none  - pt continues to need inpatient care for palliative extubation, symptom management and daily medication adjustment.     pts son bedside, has no questions at this time  hospice /comfort care     Time spent 55minutes including the care, review of records/emr, coordination and counseling of this patient, 50% of which was spent coordinating and counseling.

## 2023-01-04 NOTE — DISCHARGE NOTE FOR THE EXPIRED PATIENT - REASON FOR ADMISSION
CVA, NSTEMI, Atrial fib w/ RVR, acute renal failure, acute blood loss anemia, acute hypoxic respiratory failure

## 2023-01-04 NOTE — PROGRESS NOTE ADULT - SUBJECTIVE AND OBJECTIVE BOX
HPI:    22  pt seen and examined  worsening breathing status, while at bedside was incresased work of breathing and rattle.   head reposition. discussed possibility of giving higher dose of dilaudid  if needed.  unfortunately during that time she actively transitioned to change of breathing shallow and more relaxed and within that time  .  team immediately notified by son that he thinks she took her last breath.     PAIN: ( )Yes   (x )No   DYSPNEA: (x ) Yes  ( ) No  Level:        Review of Systems:   Unable to obtain/Limited due to: ams        PHYSICAL EXAM:    Vital Signs Last 24 Hrs  T(C): --  T(F): --  HR: --  BP: --  BP(mean): --  RR: --  SpO2: --      Daily     Daily     PPSV2: 10  %  FAST:    General: pulling  Mental Status: obtunded   HEENT: eomi, perrl  Lungs: decreased +secretions reduced rr  Cardiac: s1s2    GI: soft nontender +BS  : voids  Ext: swelling  Neuro: no gross findings       LABS:            Albumin: Albumin, Serum: 2.8 g/dL (12-30 @ 08:47)      Allergies    No Known Allergies    Intolerances      MEDICATIONS  (STANDING):  Biotene Dry Mouth Oral Rinse 15 milliLiter(s) Swish and Spit four times a day  dextrose 5% + sodium chloride 0.45%. 1000 milliLiter(s) (60 mL/Hr) IV Continuous <Continuous>  FIRST- Mouthwash  BLM 15 milliLiter(s) Swish and Spit four times a day  metoprolol tartrate Injectable 2.5 milliGRAM(s) IV Push every 6 hours  nystatin    Suspension 640310 Unit(s) Oral four times a day  scopolamine 1 mG/72 Hr(s) Patch 1 Patch Transdermal every 72 hours    MEDICATIONS  (PRN):  acetaminophen  Suppository .. 650 milliGRAM(s) Rectal every 6 hours PRN Temp greater or equal to 38C (100.4F)  glycopyrrolate Injectable 0.2 milliGRAM(s) IV Push every 6 hours PRN secretions  HYDROmorphone  Injectable 1.5 milliGRAM(s) IV Push every 2 hours PRN Severe Pain (7 - 10)  LORazepam   Injectable 0.25 milliGRAM(s) IV Push every 8 hours PRN Anxiety      RADIOLOGY
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
## 2023-03-01 NOTE — DISCHARGE NOTE NURSING/CASE MANAGEMENT/SOCIAL WORK - NSDCPEPTSTRK_GEN_ALL_CORE
Call 911 for stroke/Need for follow up after discharge/Prescribed medications/Risk factors for stroke/Stroke education booklet/Stroke support groups for patients, families, and friends/Stroke warning signs and symptoms/Signs and symptoms of stroke Information: Selecting Yes will display possible errors in your note based on the variables you have selected. This validation is only offered as a suggestion for you. PLEASE NOTE THAT THE VALIDATION TEXT WILL BE REMOVED WHEN YOU FINALIZE YOUR NOTE. IF YOU WANT TO FAX A PRELIMINARY NOTE YOU WILL NEED TO TOGGLE THIS TO 'NO' IF YOU DO NOT WANT IT IN YOUR FAXED NOTE.

## 2023-11-14 NOTE — PHYSICAL THERAPY INITIAL EVALUATION ADULT - WEIGHT-BEARING RESTRICTIONS: STAND/SIT, REHAB EVAL
Care Management    Care rounds completed. Palliative Care NP met with patient/family to revisit goals of care. CM/SW to continue to follow on DCP. At this time, patient is still with wrist restraints and also on Cardizem gtt for afib.    CM updated by PMD that patient needs hospice consult/meeting. CM spoke to NP Iraida and she placed the order. Referral sent to [facility]. Per PMD, patient appears to be imminent. SW is now calling [facility].    3:42 PM  CM was updated by [facility] intake that meeting with patient's wife will be at 1030 tomorrow. PMD was notified.    full weight-bearing

## 2024-02-16 NOTE — H&P ADULT - PATIENT'S GENDER IDENTITY
Discharge and education instructions reviewed. Patient verbalized understanding, teach-back successful. Patient denied questions at this time. No acute distress noted. Patient instructed to follow-up as noted - return to emergency department if symptoms worsen. Patient verbalized understanding. Discharged per EDMD with discharge instructions. Provided patient with a strainer to strain her urine.   
IV attempt x 2, unsuccessful.   
Patient had an episode of emesis after medications. Patient also requesting to take her xanax. Notified Dr. Dexter. Dr. Dexter at the bedside to update and re-evaluate the patient.   
Patient return from CT without incident.  
Patient wheeled to CT.  
Walked to bathroom  cloudy urine obtained  
Female

## 2024-06-11 NOTE — H&P ADULT - NSHPPHYSICALEXAM_GEN_ALL_CORE
Detail Level: Detailed Detail Level: Generalized GEN - illl appearing   HEAD - NC/AT     EYES - EOMI, no conjunctival pallor, no scleral icterus  ENT -   mucous membranes  moist , no discharge, poor dentition  NECK - Neck supple  PULM - cta  COR -  RRR, S1 S2, III/VI nile  ABD - , ND, NT, soft, no guarding, no rebound, no masses    BACK - no CVA tenderness, nontender spine     EXTREMS - no edema, no deformity, warm and well perfused    SKIN - no rash or bruising      NEUROLOGIC - alert, sensation nl, motor 5/5 RUE/LUE/RLE/LLE